# Patient Record
Sex: FEMALE | Race: WHITE | NOT HISPANIC OR LATINO
[De-identification: names, ages, dates, MRNs, and addresses within clinical notes are randomized per-mention and may not be internally consistent; named-entity substitution may affect disease eponyms.]

---

## 2017-01-05 ENCOUNTER — APPOINTMENT (OUTPATIENT)
Dept: CARDIOLOGY | Facility: CLINIC | Age: 82
End: 2017-01-05

## 2017-01-05 ENCOUNTER — NON-APPOINTMENT (OUTPATIENT)
Age: 82
End: 2017-01-05

## 2017-01-05 VITALS
BODY MASS INDEX: 24.11 KG/M2 | WEIGHT: 131 LBS | OXYGEN SATURATION: 97 % | HEART RATE: 85 BPM | DIASTOLIC BLOOD PRESSURE: 91 MMHG | HEIGHT: 62 IN | SYSTOLIC BLOOD PRESSURE: 164 MMHG

## 2017-01-05 DIAGNOSIS — I27.2 OTHER SECONDARY PULMONARY HYPERTENSION: ICD-10-CM

## 2017-01-05 DIAGNOSIS — K59.00 CONSTIPATION, UNSPECIFIED: ICD-10-CM

## 2017-01-05 DIAGNOSIS — R12 HEARTBURN: ICD-10-CM

## 2017-01-05 DIAGNOSIS — K21.9 GASTRO-ESOPHAGEAL REFLUX DISEASE W/OUT ESOPHAGITIS: ICD-10-CM

## 2017-01-05 DIAGNOSIS — Z87.898 PERSONAL HISTORY OF OTHER SPECIFIED CONDITIONS: ICD-10-CM

## 2017-01-16 ENCOUNTER — APPOINTMENT (OUTPATIENT)
Dept: CARDIOLOGY | Facility: CLINIC | Age: 82
End: 2017-01-16

## 2017-02-02 ENCOUNTER — APPOINTMENT (OUTPATIENT)
Dept: CARDIOLOGY | Facility: CLINIC | Age: 82
End: 2017-02-02

## 2017-03-05 ENCOUNTER — EMERGENCY (EMERGENCY)
Facility: HOSPITAL | Age: 82
LOS: 1 days | Discharge: HOME | End: 2017-03-05
Attending: EMERGENCY MEDICINE | Admitting: EMERGENCY MEDICINE
Payer: MEDICARE

## 2017-03-05 VITALS
HEART RATE: 82 BPM | RESPIRATION RATE: 18 BRPM | DIASTOLIC BLOOD PRESSURE: 79 MMHG | HEIGHT: 62 IN | WEIGHT: 130.07 LBS | OXYGEN SATURATION: 100 % | SYSTOLIC BLOOD PRESSURE: 155 MMHG | TEMPERATURE: 98 F

## 2017-03-05 DIAGNOSIS — I10 ESSENTIAL (PRIMARY) HYPERTENSION: ICD-10-CM

## 2017-03-05 DIAGNOSIS — Z98.89 OTHER SPECIFIED POSTPROCEDURAL STATES: Chronic | ICD-10-CM

## 2017-03-05 DIAGNOSIS — Z98.890 OTHER SPECIFIED POSTPROCEDURAL STATES: ICD-10-CM

## 2017-03-05 DIAGNOSIS — K59.00 CONSTIPATION, UNSPECIFIED: ICD-10-CM

## 2017-03-05 DIAGNOSIS — Z90.49 ACQUIRED ABSENCE OF OTHER SPECIFIED PARTS OF DIGESTIVE TRACT: ICD-10-CM

## 2017-03-05 DIAGNOSIS — E03.9 HYPOTHYROIDISM, UNSPECIFIED: ICD-10-CM

## 2017-03-05 LAB
BASOPHILS # BLD AUTO: 0.1 K/UL — SIGNIFICANT CHANGE UP (ref 0–0.2)
BASOPHILS NFR BLD AUTO: 1 % — SIGNIFICANT CHANGE UP (ref 0–2)
EOSINOPHIL # BLD AUTO: 0.1 K/UL — SIGNIFICANT CHANGE UP (ref 0–0.5)
EOSINOPHIL NFR BLD AUTO: 1.1 % — SIGNIFICANT CHANGE UP (ref 0–6)
HCT VFR BLD CALC: 35.6 % — SIGNIFICANT CHANGE UP (ref 34.5–45)
HGB BLD-MCNC: 12.5 G/DL — SIGNIFICANT CHANGE UP (ref 11.5–15.5)
LYMPHOCYTES # BLD AUTO: 1.6 K/UL — SIGNIFICANT CHANGE UP (ref 1–3.3)
LYMPHOCYTES # BLD AUTO: 26.6 % — SIGNIFICANT CHANGE UP (ref 13–44)
MCHC RBC-ENTMCNC: 32.3 PG — SIGNIFICANT CHANGE UP (ref 27–34)
MCHC RBC-ENTMCNC: 35.1 GM/DL — SIGNIFICANT CHANGE UP (ref 32–36)
MCV RBC AUTO: 92 FL — SIGNIFICANT CHANGE UP (ref 80–100)
MONOCYTES # BLD AUTO: 0.5 K/UL — SIGNIFICANT CHANGE UP (ref 0–0.9)
MONOCYTES NFR BLD AUTO: 8.6 % — SIGNIFICANT CHANGE UP (ref 1–9)
NEUTROPHILS # BLD AUTO: 3.9 K/UL — SIGNIFICANT CHANGE UP (ref 1.8–7.4)
NEUTROPHILS NFR BLD AUTO: 62.8 % — SIGNIFICANT CHANGE UP (ref 43–77)
PLATELET # BLD AUTO: 228 K/UL — SIGNIFICANT CHANGE UP (ref 150–400)
RBC # BLD: 3.86 M/UL — SIGNIFICANT CHANGE UP (ref 3.8–5.2)
RBC # FLD: 12 % — SIGNIFICANT CHANGE UP (ref 10.3–14.5)
WBC # BLD: 6.2 K/UL — SIGNIFICANT CHANGE UP (ref 3.8–10.5)
WBC # FLD AUTO: 6.2 K/UL — SIGNIFICANT CHANGE UP (ref 3.8–10.5)

## 2017-03-05 PROCEDURE — 71010: CPT | Mod: 26

## 2017-03-05 PROCEDURE — 99284 EMERGENCY DEPT VISIT MOD MDM: CPT | Mod: 25

## 2017-03-05 RX ORDER — MORPHINE SULFATE 50 MG/1
4 CAPSULE, EXTENDED RELEASE ORAL ONCE
Qty: 0 | Refills: 0 | Status: DISCONTINUED | OUTPATIENT
Start: 2017-03-05 | End: 2017-03-05

## 2017-03-05 RX ORDER — SODIUM CHLORIDE 9 MG/ML
1000 INJECTION INTRAMUSCULAR; INTRAVENOUS; SUBCUTANEOUS
Qty: 0 | Refills: 0 | Status: DISCONTINUED | OUTPATIENT
Start: 2017-03-05 | End: 2017-03-09

## 2017-03-05 RX ORDER — IOHEXOL 300 MG/ML
30 INJECTION, SOLUTION INTRAVENOUS ONCE
Qty: 0 | Refills: 0 | Status: COMPLETED | OUTPATIENT
Start: 2017-03-05 | End: 2017-03-05

## 2017-03-05 RX ORDER — SODIUM CHLORIDE 9 MG/ML
3 INJECTION INTRAMUSCULAR; INTRAVENOUS; SUBCUTANEOUS ONCE
Qty: 0 | Refills: 0 | Status: COMPLETED | OUTPATIENT
Start: 2017-03-05 | End: 2017-03-05

## 2017-03-05 RX ORDER — ONDANSETRON 8 MG/1
4 TABLET, FILM COATED ORAL ONCE
Qty: 0 | Refills: 0 | Status: COMPLETED | OUTPATIENT
Start: 2017-03-05 | End: 2017-03-05

## 2017-03-05 RX ADMIN — IOHEXOL 30 MILLILITER(S): 300 INJECTION, SOLUTION INTRAVENOUS at 23:56

## 2017-03-05 RX ADMIN — SODIUM CHLORIDE 125 MILLILITER(S): 9 INJECTION INTRAMUSCULAR; INTRAVENOUS; SUBCUTANEOUS at 23:57

## 2017-03-05 RX ADMIN — SODIUM CHLORIDE 3 MILLILITER(S): 9 INJECTION INTRAMUSCULAR; INTRAVENOUS; SUBCUTANEOUS at 23:57

## 2017-03-05 RX ADMIN — ONDANSETRON 4 MILLIGRAM(S): 8 TABLET, FILM COATED ORAL at 23:56

## 2017-03-05 RX ADMIN — Medication 30 MILLILITER(S): at 23:57

## 2017-03-05 NOTE — ED ADULT NURSE NOTE - OBJECTIVE STATEMENT
pt presents to the ER with c/o abdominal pain, cramping x several days worse today pain is a 9/10 pt denies nausea or vomiting

## 2017-03-05 NOTE — ED ADULT NURSE NOTE - PMH
Gastroesophageal reflux disease without esophagitis    Hiatal hernia    HTN (hypertension)    Hypothyroid    IBS (irritable bowel syndrome)

## 2017-03-05 NOTE — ED ADULT TRIAGE NOTE - CHIEF COMPLAINT QUOTE
"I have abdominal cramps for one day" patient denies vomiting, diarrhea, chills, fever. patient had 2 bowel movements today. patient took miralax today

## 2017-03-06 VITALS
RESPIRATION RATE: 16 BRPM | TEMPERATURE: 98 F | OXYGEN SATURATION: 98 % | HEART RATE: 75 BPM | SYSTOLIC BLOOD PRESSURE: 120 MMHG | DIASTOLIC BLOOD PRESSURE: 76 MMHG

## 2017-03-06 LAB
ALBUMIN SERPL ELPH-MCNC: 3.8 G/DL — SIGNIFICANT CHANGE UP (ref 3.3–5)
ALP SERPL-CCNC: 63 U/L — SIGNIFICANT CHANGE UP (ref 40–120)
ALT FLD-CCNC: 18 U/L — SIGNIFICANT CHANGE UP (ref 12–78)
ANION GAP SERPL CALC-SCNC: 10 MMOL/L — SIGNIFICANT CHANGE UP (ref 5–17)
APPEARANCE UR: ABNORMAL
APTT BLD: 31.8 SEC — SIGNIFICANT CHANGE UP (ref 27.5–37.4)
AST SERPL-CCNC: 18 U/L — SIGNIFICANT CHANGE UP (ref 15–37)
BILIRUB SERPL-MCNC: 0.4 MG/DL — SIGNIFICANT CHANGE UP (ref 0.2–1.2)
BILIRUB UR-MCNC: NEGATIVE — SIGNIFICANT CHANGE UP
BUN SERPL-MCNC: 18 MG/DL — SIGNIFICANT CHANGE UP (ref 7–23)
CALCIUM SERPL-MCNC: 8.8 MG/DL — SIGNIFICANT CHANGE UP (ref 8.5–10.1)
CHLORIDE SERPL-SCNC: 98 MMOL/L — SIGNIFICANT CHANGE UP (ref 96–108)
CO2 SERPL-SCNC: 29 MMOL/L — SIGNIFICANT CHANGE UP (ref 22–31)
COLOR SPEC: YELLOW — SIGNIFICANT CHANGE UP
CREAT SERPL-MCNC: 0.97 MG/DL — SIGNIFICANT CHANGE UP (ref 0.5–1.3)
DIFF PNL FLD: ABNORMAL
EPI CELLS # UR: SIGNIFICANT CHANGE UP
GLUCOSE SERPL-MCNC: 98 MG/DL — SIGNIFICANT CHANGE UP (ref 70–99)
GLUCOSE UR QL: NEGATIVE — SIGNIFICANT CHANGE UP
INR BLD: 1.1 RATIO — SIGNIFICANT CHANGE UP (ref 0.88–1.16)
KETONES UR-MCNC: NEGATIVE — SIGNIFICANT CHANGE UP
LACTATE SERPL-SCNC: 1.5 MMOL/L — SIGNIFICANT CHANGE UP (ref 0.7–2)
LEUKOCYTE ESTERASE UR-ACNC: NEGATIVE — SIGNIFICANT CHANGE UP
LIDOCAIN IGE QN: 202 U/L — SIGNIFICANT CHANGE UP (ref 73–393)
NITRITE UR-MCNC: NEGATIVE — SIGNIFICANT CHANGE UP
OB PNL STL: NEGATIVE — SIGNIFICANT CHANGE UP
PH UR: 6.5 — SIGNIFICANT CHANGE UP (ref 4.8–8)
POTASSIUM SERPL-MCNC: 4 MMOL/L — SIGNIFICANT CHANGE UP (ref 3.5–5.3)
POTASSIUM SERPL-SCNC: 4 MMOL/L — SIGNIFICANT CHANGE UP (ref 3.5–5.3)
PROT SERPL-MCNC: 6.6 G/DL — SIGNIFICANT CHANGE UP (ref 6–8.3)
PROT UR-MCNC: NEGATIVE — SIGNIFICANT CHANGE UP
PROTHROM AB SERPL-ACNC: 12.2 SEC — SIGNIFICANT CHANGE UP (ref 10–13.1)
RBC CASTS # UR COMP ASSIST: ABNORMAL /HPF (ref 0–4)
SODIUM SERPL-SCNC: 137 MMOL/L — SIGNIFICANT CHANGE UP (ref 135–145)
SP GR SPEC: 1.01 — SIGNIFICANT CHANGE UP (ref 1.01–1.02)
UROBILINOGEN FLD QL: NEGATIVE — SIGNIFICANT CHANGE UP
WBC UR QL: SIGNIFICANT CHANGE UP

## 2017-03-06 PROCEDURE — 85610 PROTHROMBIN TIME: CPT

## 2017-03-06 PROCEDURE — 74177 CT ABD & PELVIS W/CONTRAST: CPT

## 2017-03-06 PROCEDURE — 99284 EMERGENCY DEPT VISIT MOD MDM: CPT | Mod: 25

## 2017-03-06 PROCEDURE — 87086 URINE CULTURE/COLONY COUNT: CPT

## 2017-03-06 PROCEDURE — 83690 ASSAY OF LIPASE: CPT

## 2017-03-06 PROCEDURE — 85730 THROMBOPLASTIN TIME PARTIAL: CPT

## 2017-03-06 PROCEDURE — 82272 OCCULT BLD FECES 1-3 TESTS: CPT

## 2017-03-06 PROCEDURE — 80053 COMPREHEN METABOLIC PANEL: CPT

## 2017-03-06 PROCEDURE — 74177 CT ABD & PELVIS W/CONTRAST: CPT | Mod: 26

## 2017-03-06 PROCEDURE — 81001 URINALYSIS AUTO W/SCOPE: CPT

## 2017-03-06 PROCEDURE — 96374 THER/PROPH/DIAG INJ IV PUSH: CPT

## 2017-03-06 PROCEDURE — 83605 ASSAY OF LACTIC ACID: CPT

## 2017-03-06 PROCEDURE — 85027 COMPLETE CBC AUTOMATED: CPT

## 2017-03-06 PROCEDURE — 71045 X-RAY EXAM CHEST 1 VIEW: CPT

## 2017-03-06 NOTE — ED PROVIDER NOTE - CONSTITUTIONAL, MLM
normal... Well appearing, well nourished, awake, alert, oriented to person, place, time/situation and uncomfortable holding her stomach as she was walked into the er from the waiting room

## 2017-03-06 NOTE — ED PROVIDER NOTE - OBJECTIVE STATEMENT
Pt is a 83 yo f who has hx of ibs gi is dr Lowell Jones pmd is dr tatiana Alonso.  she has hsx of hiatal hernia repair, htn colon polyp for pavan smoker  she gets bouts of constipation for which she takes daily miralax 1/2 cap. as the full cap would give her diarrhea.  today she was feeling well despite not havign a large regular bm. ate a big dinner (gluten and latose free for 2 weeks), and soon after developed abd pain cramping and feeling unwell. she tried to have a bm but had no success. feeling miserable for few hours she came to er with son for eval. no fever no n no vomiting  no chest terry no back pain no sob no urine sx

## 2017-03-06 NOTE — ED PROVIDER NOTE - PSH
H/O hernia repair  12/22/2015  S/P colon resection  removal mof being colon polyp  S/P inguinal hernia repair  left 5 years ago

## 2017-03-07 LAB
CULTURE RESULTS: SIGNIFICANT CHANGE UP
SPECIMEN SOURCE: SIGNIFICANT CHANGE UP

## 2017-06-29 ENCOUNTER — EMERGENCY (EMERGENCY)
Facility: HOSPITAL | Age: 82
LOS: 1 days | Discharge: HOME | End: 2017-06-29
Attending: EMERGENCY MEDICINE | Admitting: EMERGENCY MEDICINE
Payer: MEDICARE

## 2017-06-29 VITALS
DIASTOLIC BLOOD PRESSURE: 77 MMHG | TEMPERATURE: 98 F | SYSTOLIC BLOOD PRESSURE: 129 MMHG | HEART RATE: 61 BPM | WEIGHT: 136.69 LBS | OXYGEN SATURATION: 96 % | RESPIRATION RATE: 16 BRPM

## 2017-06-29 VITALS
OXYGEN SATURATION: 97 % | DIASTOLIC BLOOD PRESSURE: 86 MMHG | HEART RATE: 68 BPM | SYSTOLIC BLOOD PRESSURE: 136 MMHG | RESPIRATION RATE: 15 BRPM

## 2017-06-29 DIAGNOSIS — N39.0 URINARY TRACT INFECTION, SITE NOT SPECIFIED: ICD-10-CM

## 2017-06-29 DIAGNOSIS — M54.42 LUMBAGO WITH SCIATICA, LEFT SIDE: ICD-10-CM

## 2017-06-29 DIAGNOSIS — Z98.89 OTHER SPECIFIED POSTPROCEDURAL STATES: Chronic | ICD-10-CM

## 2017-06-29 DIAGNOSIS — M54.5 LOW BACK PAIN: ICD-10-CM

## 2017-06-29 DIAGNOSIS — K21.9 GASTRO-ESOPHAGEAL REFLUX DISEASE WITHOUT ESOPHAGITIS: ICD-10-CM

## 2017-06-29 DIAGNOSIS — K58.9 IRRITABLE BOWEL SYNDROME WITHOUT DIARRHEA: ICD-10-CM

## 2017-06-29 DIAGNOSIS — E03.9 HYPOTHYROIDISM, UNSPECIFIED: ICD-10-CM

## 2017-06-29 DIAGNOSIS — Z98.0 INTESTINAL BYPASS AND ANASTOMOSIS STATUS: ICD-10-CM

## 2017-06-29 DIAGNOSIS — I10 ESSENTIAL (PRIMARY) HYPERTENSION: ICD-10-CM

## 2017-06-29 LAB
APPEARANCE UR: CLEAR — SIGNIFICANT CHANGE UP
BILIRUB UR-MCNC: NEGATIVE — SIGNIFICANT CHANGE UP
COLOR SPEC: SIGNIFICANT CHANGE UP
DIFF PNL FLD: ABNORMAL
GLUCOSE UR QL: NEGATIVE — SIGNIFICANT CHANGE UP
KETONES UR-MCNC: NEGATIVE — SIGNIFICANT CHANGE UP
LEUKOCYTE ESTERASE UR-ACNC: ABNORMAL
NITRITE UR-MCNC: NEGATIVE — SIGNIFICANT CHANGE UP
PH UR: 6.5 — SIGNIFICANT CHANGE UP (ref 5–8)
PROT UR-MCNC: NEGATIVE — SIGNIFICANT CHANGE UP
SP GR SPEC: 1.01 — SIGNIFICANT CHANGE UP (ref 1.01–1.02)
UROBILINOGEN FLD QL: NEGATIVE — SIGNIFICANT CHANGE UP

## 2017-06-29 PROCEDURE — 72100 X-RAY EXAM L-S SPINE 2/3 VWS: CPT | Mod: 26

## 2017-06-29 PROCEDURE — 81001 URINALYSIS AUTO W/SCOPE: CPT

## 2017-06-29 PROCEDURE — 87086 URINE CULTURE/COLONY COUNT: CPT

## 2017-06-29 PROCEDURE — 72100 X-RAY EXAM L-S SPINE 2/3 VWS: CPT

## 2017-06-29 PROCEDURE — 99283 EMERGENCY DEPT VISIT LOW MDM: CPT | Mod: 25

## 2017-06-29 PROCEDURE — 99283 EMERGENCY DEPT VISIT LOW MDM: CPT

## 2017-06-29 RX ORDER — LIDOCAINE 4 G/100G
1 CREAM TOPICAL
Qty: 5 | Refills: 0 | OUTPATIENT
Start: 2017-06-29 | End: 2017-07-04

## 2017-06-29 RX ORDER — NITROFURANTOIN MACROCRYSTAL 50 MG
100 CAPSULE ORAL ONCE
Qty: 0 | Refills: 0 | Status: COMPLETED | OUTPATIENT
Start: 2017-06-29 | End: 2017-06-29

## 2017-06-29 RX ORDER — NITROFURANTOIN MACROCRYSTAL 50 MG
1 CAPSULE ORAL
Qty: 6 | Refills: 0 | OUTPATIENT
Start: 2017-06-29 | End: 2017-07-02

## 2017-06-29 RX ORDER — LIDOCAINE 4 G/100G
1 CREAM TOPICAL ONCE
Qty: 0 | Refills: 0 | Status: COMPLETED | OUTPATIENT
Start: 2017-06-29 | End: 2017-06-29

## 2017-06-29 RX ORDER — ACETAMINOPHEN 500 MG
650 TABLET ORAL ONCE
Qty: 0 | Refills: 0 | Status: COMPLETED | OUTPATIENT
Start: 2017-06-29 | End: 2017-06-29

## 2017-06-29 RX ADMIN — LIDOCAINE 1 PATCH: 4 CREAM TOPICAL at 21:27

## 2017-06-29 RX ADMIN — Medication 100 MILLIGRAM(S): at 21:02

## 2017-06-29 NOTE — ED ADULT NURSE NOTE - OBJECTIVE STATEMENT
patient c/o bilateral lower back pain, hx of chronic UTI and sciatica. No fever, no other complaints. Daughter states patient has appointment with her urologist tomorrow and will keep that appointment

## 2017-06-29 NOTE — ED PROVIDER NOTE - CHPI ED SYMPTOMS NEG
no bowel dysfunction/no motor function loss/no constipation/no numbness/no difficulty bearing weight/no bladder dysfunction/no anorexia/no tingling/no neck tenderness/no fatigue

## 2017-06-29 NOTE — ED PROVIDER NOTE - CARE PLAN
Principal Discharge DX:	Acute low back pain with left-sided sciatica, unspecified back pain laterality  Secondary Diagnosis:	Urinary tract infection, site unspecified

## 2017-06-29 NOTE — ED PROVIDER NOTE - MEDICAL DECISION MAKING DETAILS
xray, pain management, ua, urine culture, reeval  Please follow up with your Primary MD in 24-48 hr. Macrobid twice a day x 3 days. Lidoderm patch apply once daily to affected area as needed. OTC tylenol every 6 hours as needed for pain. Return to ED immediately if condition worsens or any concerns.  Seek immediate medical care for any new/worsening signs or symptoms.

## 2017-06-29 NOTE — ED PROVIDER NOTE - ATTENDING CONTRIBUTION TO CARE
Pt is a 83 yo female who presents to the ED with a cc of back pain.  Pt reports that the back pain began today.  She reports bilateral lower lumbar back pain with radiation down both legs ending right above her knees.  Pt reports pain is worse on her left side.  Denies trauma to her back denies heavy lifting.  Pt does suffer from arthritis and has had flares of sciatica in the past. Denies numbness or tingling in ext.  Denies loss of bowel or bladder function.  Denies fever, chills, N/V, CP, SOB, abd pain.  Pt does report dysuria, urinary frequency, and urgency.  She is concerned that she may have a UTI because she does suffer from frequent UTIs.  On exam sitting in bed in NAD.  Heart RRR, lungs CTA, abd soft NT/ND.  TTP bilateral lower lumbar paraspinal region with increased spasm/tone worse on the left.  No midline lumbar or thoracic tenderness.  Positive straight leg bilaterally.  Sensation intact to bilateral lower ext +pedal pulses. Agree with above.  Discussed potential for muscle relaxants but pt refusing at this time as she is unsteady on her feet already

## 2017-06-29 NOTE — ED PROVIDER NOTE - OBJECTIVE STATEMENT
85 y/o female presents to ED c/o lower back pain radiating to left leg x 1 day. Rates pain 5/10, gradual onset, worse with movement. Pt states she has h/o sciatica in the past. Denies recent fall or trauma. States she also has h/o uti's. Denies dysuria, hematuria, urinary frequency. Denies any other complaints. States she otherwise feels good. Denies urinary or bowel incontinence. Denies saddle paresthesia. Denies n/v, f/c, chest pain, sob, numbness, tingling.

## 2017-06-29 NOTE — ED PROVIDER NOTE - PHYSICAL EXAMINATION
No CVA tenderness BL.   Head- NC/AT. No lindsey signs, no raccoon eyes, no hemotympanum, no contusions, no hematoma, no dental injuries.  Spine- no midline or paraspinal tenderness of cspine, thoracic spine or lumbar spine. No signs of back trauma, no masses, no abrasions, no lacerations, no redness, no bruising.  Neck- supple, no midline tenderness to palpation, + FROM, NEXUS negative, no abrasions, no ecchymosis.  Neuro- EOM intact, no nystagmus. Pelvis stable. Pt able to straight leg raise BL. NVI, good distal pulses x 4 extremities, capillary refill <2 sec x 4 extremities, sensation intact throughout, 5/5 motor x 4 extremities. Gait normal without limp. DTRs normal x 4 extremities.

## 2017-07-01 LAB
CULTURE RESULTS: SIGNIFICANT CHANGE UP
SPECIMEN SOURCE: SIGNIFICANT CHANGE UP

## 2018-04-23 ENCOUNTER — EMERGENCY (EMERGENCY)
Facility: HOSPITAL | Age: 83
LOS: 1 days | Discharge: HOME | End: 2018-04-23
Attending: EMERGENCY MEDICINE | Admitting: EMERGENCY MEDICINE
Payer: MEDICARE

## 2018-04-23 VITALS
HEIGHT: 62 IN | TEMPERATURE: 97 F | HEART RATE: 91 BPM | RESPIRATION RATE: 18 BRPM | WEIGHT: 130.07 LBS | SYSTOLIC BLOOD PRESSURE: 158 MMHG | DIASTOLIC BLOOD PRESSURE: 78 MMHG | OXYGEN SATURATION: 97 %

## 2018-04-23 VITALS — RESPIRATION RATE: 18 BRPM | OXYGEN SATURATION: 100 %

## 2018-04-23 DIAGNOSIS — Z98.89 OTHER SPECIFIED POSTPROCEDURAL STATES: Chronic | ICD-10-CM

## 2018-04-23 LAB
ALBUMIN SERPL ELPH-MCNC: 3.7 G/DL — SIGNIFICANT CHANGE UP (ref 3.3–5)
ALP SERPL-CCNC: 74 U/L — SIGNIFICANT CHANGE UP (ref 40–120)
ALT FLD-CCNC: 18 U/L — SIGNIFICANT CHANGE UP (ref 12–78)
ANION GAP SERPL CALC-SCNC: 7 MMOL/L — SIGNIFICANT CHANGE UP (ref 5–17)
APPEARANCE UR: ABNORMAL
AST SERPL-CCNC: 20 U/L — SIGNIFICANT CHANGE UP (ref 15–37)
BACTERIA # UR AUTO: ABNORMAL
BASOPHILS # BLD AUTO: 0.1 K/UL — SIGNIFICANT CHANGE UP (ref 0–0.2)
BASOPHILS NFR BLD AUTO: 0.8 % — SIGNIFICANT CHANGE UP (ref 0–2)
BILIRUB SERPL-MCNC: 0.6 MG/DL — SIGNIFICANT CHANGE UP (ref 0.2–1.2)
BILIRUB UR-MCNC: NEGATIVE — SIGNIFICANT CHANGE UP
BUN SERPL-MCNC: 13 MG/DL — SIGNIFICANT CHANGE UP (ref 7–23)
CALCIUM SERPL-MCNC: 8.5 MG/DL — SIGNIFICANT CHANGE UP (ref 8.5–10.1)
CHLORIDE SERPL-SCNC: 103 MMOL/L — SIGNIFICANT CHANGE UP (ref 96–108)
CK MB BLD-MCNC: 3.2 % — SIGNIFICANT CHANGE UP (ref 0–3.5)
CK MB CFR SERPL CALC: 3.8 NG/ML — HIGH (ref 0–3.6)
CK SERPL-CCNC: 118 U/L — SIGNIFICANT CHANGE UP (ref 26–192)
CO2 SERPL-SCNC: 28 MMOL/L — SIGNIFICANT CHANGE UP (ref 22–31)
COLOR SPEC: SIGNIFICANT CHANGE UP
CREAT SERPL-MCNC: 0.72 MG/DL — SIGNIFICANT CHANGE UP (ref 0.5–1.3)
D DIMER BLD IA.RAPID-MCNC: <150 NG/ML DDU — SIGNIFICANT CHANGE UP
DIFF PNL FLD: ABNORMAL
EOSINOPHIL # BLD AUTO: 0 K/UL — SIGNIFICANT CHANGE UP (ref 0–0.5)
EOSINOPHIL NFR BLD AUTO: 0.3 % — SIGNIFICANT CHANGE UP (ref 0–6)
EPI CELLS # UR: SIGNIFICANT CHANGE UP
GLUCOSE SERPL-MCNC: 92 MG/DL — SIGNIFICANT CHANGE UP (ref 70–99)
GLUCOSE UR QL: NEGATIVE — SIGNIFICANT CHANGE UP
HCT VFR BLD CALC: 39.3 % — SIGNIFICANT CHANGE UP (ref 34.5–45)
HGB BLD-MCNC: 13.4 G/DL — SIGNIFICANT CHANGE UP (ref 11.5–15.5)
KETONES UR-MCNC: NEGATIVE — SIGNIFICANT CHANGE UP
LEUKOCYTE ESTERASE UR-ACNC: NEGATIVE — SIGNIFICANT CHANGE UP
LIDOCAIN IGE QN: 117 U/L — SIGNIFICANT CHANGE UP (ref 73–393)
LYMPHOCYTES # BLD AUTO: 1.1 K/UL — SIGNIFICANT CHANGE UP (ref 1–3.3)
LYMPHOCYTES # BLD AUTO: 16.6 % — SIGNIFICANT CHANGE UP (ref 13–44)
MCHC RBC-ENTMCNC: 32 PG — SIGNIFICANT CHANGE UP (ref 27–34)
MCHC RBC-ENTMCNC: 34 GM/DL — SIGNIFICANT CHANGE UP (ref 32–36)
MCV RBC AUTO: 94.3 FL — SIGNIFICANT CHANGE UP (ref 80–100)
MONOCYTES # BLD AUTO: 0.6 K/UL — SIGNIFICANT CHANGE UP (ref 0–0.9)
MONOCYTES NFR BLD AUTO: 9.4 % — HIGH (ref 1–9)
NEUTROPHILS # BLD AUTO: 4.6 K/UL — SIGNIFICANT CHANGE UP (ref 1.8–7.4)
NEUTROPHILS NFR BLD AUTO: 72.9 % — SIGNIFICANT CHANGE UP (ref 43–77)
NITRITE UR-MCNC: NEGATIVE — SIGNIFICANT CHANGE UP
PH UR: 7 — SIGNIFICANT CHANGE UP (ref 5–8)
PLATELET # BLD AUTO: 224 K/UL — SIGNIFICANT CHANGE UP (ref 150–400)
POTASSIUM SERPL-MCNC: 4 MMOL/L — SIGNIFICANT CHANGE UP (ref 3.5–5.3)
POTASSIUM SERPL-SCNC: 4 MMOL/L — SIGNIFICANT CHANGE UP (ref 3.5–5.3)
PROT SERPL-MCNC: 7.1 G/DL — SIGNIFICANT CHANGE UP (ref 6–8.3)
PROT UR-MCNC: NEGATIVE — SIGNIFICANT CHANGE UP
RBC # BLD: 4.17 M/UL — SIGNIFICANT CHANGE UP (ref 3.8–5.2)
RBC # FLD: 12 % — SIGNIFICANT CHANGE UP (ref 10.3–14.5)
RBC CASTS # UR COMP ASSIST: ABNORMAL /HPF (ref 0–4)
SODIUM SERPL-SCNC: 138 MMOL/L — SIGNIFICANT CHANGE UP (ref 135–145)
SP GR SPEC: 1.01 — SIGNIFICANT CHANGE UP (ref 1.01–1.02)
TROPONIN I SERPL-MCNC: <.015 NG/ML — SIGNIFICANT CHANGE UP (ref 0.01–0.04)
UROBILINOGEN FLD QL: NEGATIVE — SIGNIFICANT CHANGE UP
WBC # BLD: 6.4 K/UL — SIGNIFICANT CHANGE UP (ref 3.8–10.5)
WBC # FLD AUTO: 6.4 K/UL — SIGNIFICANT CHANGE UP (ref 3.8–10.5)
WBC UR QL: SIGNIFICANT CHANGE UP

## 2018-04-23 PROCEDURE — 99285 EMERGENCY DEPT VISIT HI MDM: CPT

## 2018-04-23 PROCEDURE — 81001 URINALYSIS AUTO W/SCOPE: CPT

## 2018-04-23 PROCEDURE — 85379 FIBRIN DEGRADATION QUANT: CPT

## 2018-04-23 PROCEDURE — 83690 ASSAY OF LIPASE: CPT

## 2018-04-23 PROCEDURE — 82553 CREATINE MB FRACTION: CPT

## 2018-04-23 PROCEDURE — 99284 EMERGENCY DEPT VISIT MOD MDM: CPT | Mod: 25

## 2018-04-23 PROCEDURE — 93005 ELECTROCARDIOGRAM TRACING: CPT

## 2018-04-23 PROCEDURE — 82550 ASSAY OF CK (CPK): CPT

## 2018-04-23 PROCEDURE — 85027 COMPLETE CBC AUTOMATED: CPT

## 2018-04-23 PROCEDURE — 80053 COMPREHEN METABOLIC PANEL: CPT

## 2018-04-23 PROCEDURE — 93010 ELECTROCARDIOGRAM REPORT: CPT

## 2018-04-23 PROCEDURE — 71045 X-RAY EXAM CHEST 1 VIEW: CPT | Mod: 26

## 2018-04-23 PROCEDURE — 70450 CT HEAD/BRAIN W/O DYE: CPT

## 2018-04-23 PROCEDURE — 87086 URINE CULTURE/COLONY COUNT: CPT

## 2018-04-23 PROCEDURE — 84484 ASSAY OF TROPONIN QUANT: CPT

## 2018-04-23 PROCEDURE — 96374 THER/PROPH/DIAG INJ IV PUSH: CPT

## 2018-04-23 PROCEDURE — 70450 CT HEAD/BRAIN W/O DYE: CPT | Mod: 26

## 2018-04-23 PROCEDURE — 71045 X-RAY EXAM CHEST 1 VIEW: CPT

## 2018-04-23 RX ORDER — ONDANSETRON 8 MG/1
4 TABLET, FILM COATED ORAL ONCE
Qty: 0 | Refills: 0 | Status: COMPLETED | OUTPATIENT
Start: 2018-04-23 | End: 2018-04-23

## 2018-04-23 RX ORDER — SODIUM CHLORIDE 9 MG/ML
1000 INJECTION INTRAMUSCULAR; INTRAVENOUS; SUBCUTANEOUS ONCE
Qty: 0 | Refills: 0 | Status: COMPLETED | OUTPATIENT
Start: 2018-04-23 | End: 2018-04-23

## 2018-04-23 RX ADMIN — SODIUM CHLORIDE 1000 MILLILITER(S): 9 INJECTION INTRAMUSCULAR; INTRAVENOUS; SUBCUTANEOUS at 12:06

## 2018-04-23 RX ADMIN — ONDANSETRON 4 MILLIGRAM(S): 8 TABLET, FILM COATED ORAL at 12:06

## 2018-04-23 NOTE — ED PROVIDER NOTE - OBJECTIVE STATEMENT
85 female presents to ER with son c/o feeling dizziness, states last night she felt light headed and passed out for a few seconds, today patient feeling anxious and continued light headed, no vomiting, no chest pain.

## 2018-04-23 NOTE — ED ADULT NURSE NOTE - OBJECTIVE STATEMENT
85F pt AxOx3 ambulatory to ED c/o syncope last night. Pt denies CP/SOB/V/D at time of episode and currently. Pt states she suddenly felt dizzy and syncopized to floor at home, hitting her arm on the way down. Pt denies head injury. Pt states she is currently nauseous. Pt is tolerating PO intake well. No cardiac hx. Upon assessment, lungs clear, resp even, unlabored. Abd soft/NT/ND. Minor abrasion noted to R forearm, no obvious deformity. MORA. Pt ambulates at baseline w/o assist, steady gait. #20G LAC, labs drawn and sent. CM in place - NSR 80s. EKG @ bedside. Family at bedside. MD at bedside. Will continue to monitor. Safety maintained.

## 2018-04-23 NOTE — ED PROVIDER NOTE - PROGRESS NOTE DETAILS
patient feeling well, able to ambulate with out assistance, labs, ct head, ekg, chest xray reviwed, no acute findings, dc home, f/u with PMD, understands to return to ER for worsneing of symptoms

## 2018-04-24 LAB
CULTURE RESULTS: SIGNIFICANT CHANGE UP
SPECIMEN SOURCE: SIGNIFICANT CHANGE UP

## 2018-07-14 ENCOUNTER — APPOINTMENT (OUTPATIENT)
Dept: DERMATOLOGY | Facility: CLINIC | Age: 83
End: 2018-07-14
Payer: MEDICARE

## 2018-07-14 PROCEDURE — 99213 OFFICE O/P EST LOW 20 MIN: CPT | Mod: 25

## 2018-07-14 PROCEDURE — 17000 DESTRUCT PREMALG LESION: CPT

## 2018-11-23 ENCOUNTER — EMERGENCY (EMERGENCY)
Facility: HOSPITAL | Age: 83
LOS: 1 days | Discharge: HOME | End: 2018-11-23
Attending: EMERGENCY MEDICINE
Payer: MEDICARE

## 2018-11-23 VITALS
DIASTOLIC BLOOD PRESSURE: 87 MMHG | TEMPERATURE: 98 F | HEART RATE: 83 BPM | RESPIRATION RATE: 16 BRPM | WEIGHT: 132.94 LBS | OXYGEN SATURATION: 97 % | SYSTOLIC BLOOD PRESSURE: 149 MMHG | HEIGHT: 62 IN

## 2018-11-23 VITALS
RESPIRATION RATE: 17 BRPM | DIASTOLIC BLOOD PRESSURE: 77 MMHG | SYSTOLIC BLOOD PRESSURE: 159 MMHG | HEART RATE: 77 BPM | OXYGEN SATURATION: 100 % | TEMPERATURE: 98 F

## 2018-11-23 DIAGNOSIS — Z98.89 OTHER SPECIFIED POSTPROCEDURAL STATES: Chronic | ICD-10-CM

## 2018-11-23 PROBLEM — K58.9 IRRITABLE BOWEL SYNDROME WITHOUT DIARRHEA: Chronic | Status: ACTIVE | Noted: 2017-03-05

## 2018-11-23 LAB
ALBUMIN SERPL ELPH-MCNC: 4 G/DL — SIGNIFICANT CHANGE UP (ref 3.3–5)
ALP SERPL-CCNC: 81 U/L — SIGNIFICANT CHANGE UP (ref 40–120)
ALT FLD-CCNC: 24 U/L — SIGNIFICANT CHANGE UP (ref 12–78)
ANION GAP SERPL CALC-SCNC: 7 MMOL/L — SIGNIFICANT CHANGE UP (ref 5–17)
AST SERPL-CCNC: 22 U/L — SIGNIFICANT CHANGE UP (ref 15–37)
BASOPHILS # BLD AUTO: 0.03 K/UL — SIGNIFICANT CHANGE UP (ref 0–0.2)
BASOPHILS NFR BLD AUTO: 0.4 % — SIGNIFICANT CHANGE UP (ref 0–2)
BILIRUB SERPL-MCNC: 0.6 MG/DL — SIGNIFICANT CHANGE UP (ref 0.2–1.2)
BUN SERPL-MCNC: 16 MG/DL — SIGNIFICANT CHANGE UP (ref 7–23)
CALCIUM SERPL-MCNC: 8.4 MG/DL — LOW (ref 8.5–10.1)
CHLORIDE SERPL-SCNC: 103 MMOL/L — SIGNIFICANT CHANGE UP (ref 96–108)
CK MB BLD-MCNC: 3.6 % — HIGH (ref 0–3.5)
CK MB CFR SERPL CALC: 5.6 NG/ML — HIGH (ref 0–3.6)
CK SERPL-CCNC: 155 U/L — SIGNIFICANT CHANGE UP (ref 26–192)
CO2 SERPL-SCNC: 26 MMOL/L — SIGNIFICANT CHANGE UP (ref 22–31)
CREAT SERPL-MCNC: 0.89 MG/DL — SIGNIFICANT CHANGE UP (ref 0.5–1.3)
EOSINOPHIL # BLD AUTO: 0.03 K/UL — SIGNIFICANT CHANGE UP (ref 0–0.5)
EOSINOPHIL NFR BLD AUTO: 0.4 % — SIGNIFICANT CHANGE UP (ref 0–6)
GLUCOSE SERPL-MCNC: 82 MG/DL — SIGNIFICANT CHANGE UP (ref 70–99)
HCT VFR BLD CALC: 41.6 % — SIGNIFICANT CHANGE UP (ref 34.5–45)
HGB BLD-MCNC: 13.8 G/DL — SIGNIFICANT CHANGE UP (ref 11.5–15.5)
IMM GRANULOCYTES NFR BLD AUTO: 0.4 % — SIGNIFICANT CHANGE UP (ref 0–1.5)
INR BLD: 1.08 RATIO — SIGNIFICANT CHANGE UP (ref 0.88–1.16)
LYMPHOCYTES # BLD AUTO: 1.4 K/UL — SIGNIFICANT CHANGE UP (ref 1–3.3)
LYMPHOCYTES # BLD AUTO: 19.1 % — SIGNIFICANT CHANGE UP (ref 13–44)
MCHC RBC-ENTMCNC: 30.7 PG — SIGNIFICANT CHANGE UP (ref 27–34)
MCHC RBC-ENTMCNC: 33.2 GM/DL — SIGNIFICANT CHANGE UP (ref 32–36)
MCV RBC AUTO: 92.4 FL — SIGNIFICANT CHANGE UP (ref 80–100)
MONOCYTES # BLD AUTO: 0.65 K/UL — SIGNIFICANT CHANGE UP (ref 0–0.9)
MONOCYTES NFR BLD AUTO: 8.9 % — SIGNIFICANT CHANGE UP (ref 2–14)
NEUTROPHILS # BLD AUTO: 5.18 K/UL — SIGNIFICANT CHANGE UP (ref 1.8–7.4)
NEUTROPHILS NFR BLD AUTO: 70.8 % — SIGNIFICANT CHANGE UP (ref 43–77)
NRBC # BLD: 0 /100 WBCS — SIGNIFICANT CHANGE UP (ref 0–0)
PLATELET # BLD AUTO: 232 K/UL — SIGNIFICANT CHANGE UP (ref 150–400)
POTASSIUM SERPL-MCNC: 4.1 MMOL/L — SIGNIFICANT CHANGE UP (ref 3.5–5.3)
POTASSIUM SERPL-SCNC: 4.1 MMOL/L — SIGNIFICANT CHANGE UP (ref 3.5–5.3)
PROT SERPL-MCNC: 7.7 G/DL — SIGNIFICANT CHANGE UP (ref 6–8.3)
PROTHROM AB SERPL-ACNC: 12.2 SEC — SIGNIFICANT CHANGE UP (ref 10–12.9)
RBC # BLD: 4.5 M/UL — SIGNIFICANT CHANGE UP (ref 3.8–5.2)
RBC # FLD: 13.2 % — SIGNIFICANT CHANGE UP (ref 10.3–14.5)
SODIUM SERPL-SCNC: 136 MMOL/L — SIGNIFICANT CHANGE UP (ref 135–145)
TROPONIN I SERPL-MCNC: <.015 NG/ML — SIGNIFICANT CHANGE UP (ref 0.01–0.04)
TSH SERPL-MCNC: 7.63 UIU/ML — HIGH (ref 0.36–3.74)
WBC # BLD: 7.32 K/UL — SIGNIFICANT CHANGE UP (ref 3.8–10.5)
WBC # FLD AUTO: 7.32 K/UL — SIGNIFICANT CHANGE UP (ref 3.8–10.5)

## 2018-11-23 PROCEDURE — 96376 TX/PRO/DX INJ SAME DRUG ADON: CPT

## 2018-11-23 PROCEDURE — 82550 ASSAY OF CK (CPK): CPT

## 2018-11-23 PROCEDURE — 84443 ASSAY THYROID STIM HORMONE: CPT

## 2018-11-23 PROCEDURE — 70450 CT HEAD/BRAIN W/O DYE: CPT

## 2018-11-23 PROCEDURE — 82962 GLUCOSE BLOOD TEST: CPT

## 2018-11-23 PROCEDURE — 70450 CT HEAD/BRAIN W/O DYE: CPT | Mod: 26

## 2018-11-23 PROCEDURE — 82553 CREATINE MB FRACTION: CPT

## 2018-11-23 PROCEDURE — 36415 COLL VENOUS BLD VENIPUNCTURE: CPT

## 2018-11-23 PROCEDURE — 96374 THER/PROPH/DIAG INJ IV PUSH: CPT

## 2018-11-23 PROCEDURE — 99284 EMERGENCY DEPT VISIT MOD MDM: CPT

## 2018-11-23 PROCEDURE — 85610 PROTHROMBIN TIME: CPT

## 2018-11-23 PROCEDURE — 80053 COMPREHEN METABOLIC PANEL: CPT

## 2018-11-23 PROCEDURE — 84484 ASSAY OF TROPONIN QUANT: CPT

## 2018-11-23 PROCEDURE — 93005 ELECTROCARDIOGRAM TRACING: CPT

## 2018-11-23 PROCEDURE — 71046 X-RAY EXAM CHEST 2 VIEWS: CPT | Mod: 26

## 2018-11-23 PROCEDURE — 99284 EMERGENCY DEPT VISIT MOD MDM: CPT | Mod: 25

## 2018-11-23 PROCEDURE — 71046 X-RAY EXAM CHEST 2 VIEWS: CPT

## 2018-11-23 PROCEDURE — 96361 HYDRATE IV INFUSION ADD-ON: CPT

## 2018-11-23 PROCEDURE — 85027 COMPLETE CBC AUTOMATED: CPT

## 2018-11-23 RX ORDER — ONDANSETRON 8 MG/1
4 TABLET, FILM COATED ORAL ONCE
Qty: 0 | Refills: 0 | Status: COMPLETED | OUTPATIENT
Start: 2018-11-23 | End: 2018-11-23

## 2018-11-23 RX ORDER — SODIUM CHLORIDE 9 MG/ML
1000 INJECTION INTRAMUSCULAR; INTRAVENOUS; SUBCUTANEOUS
Qty: 0 | Refills: 0 | Status: DISCONTINUED | OUTPATIENT
Start: 2018-11-23 | End: 2018-11-27

## 2018-11-23 RX ORDER — ONDANSETRON 8 MG/1
1 TABLET, FILM COATED ORAL
Qty: 20 | Refills: 0
Start: 2018-11-23 | End: 2018-11-27

## 2018-11-23 RX ADMIN — ONDANSETRON 4 MILLIGRAM(S): 8 TABLET, FILM COATED ORAL at 13:17

## 2018-11-23 RX ADMIN — ONDANSETRON 4 MILLIGRAM(S): 8 TABLET, FILM COATED ORAL at 11:41

## 2018-11-23 RX ADMIN — SODIUM CHLORIDE 150 MILLILITER(S): 9 INJECTION INTRAMUSCULAR; INTRAVENOUS; SUBCUTANEOUS at 11:41

## 2018-11-23 RX ADMIN — SODIUM CHLORIDE 1000 MILLILITER(S): 9 INJECTION INTRAMUSCULAR; INTRAVENOUS; SUBCUTANEOUS at 14:58

## 2018-11-23 NOTE — ED PROVIDER NOTE - PROGRESS NOTE DETAILS
All results were explained to patient and/or family and a copy of all available results given.  pt ambulated in the ED s any complications

## 2018-11-23 NOTE — ED ADULT NURSE NOTE - OBJECTIVE STATEMENT
Present to ER with c/o intermittent dizziness and nausea for 2 weeks. Pt states she was seen by her PMD this past Wednesday and had blood work and EKG.

## 2018-11-23 NOTE — ED PROVIDER NOTE - OBJECTIVE STATEMENT
Lightheaded and dizzy and nausea x 2 weeks intermittent.  pmd- Eleno Otero.  pt was seen by pmd this past Wednesday, had blood work and ekg.  no prior episode. no other complaint. Lightheaded and dizzy and nausea x 2 weeks intermittent.  pmd- Eleno Otero.  pt was seen by pmd this past Wednesday, had blood work and ekg and Rx Meclizine that made her felt worst.  no prior episode. no other complaint.

## 2018-11-23 NOTE — ED ADULT NURSE NOTE - NSIMPLEMENTINTERV_GEN_ALL_ED
Implemented All Universal Safety Interventions:  [place] to call system. Call bell, personal items and telephone within reach. Instruct patient to call for assistance. Room bathroom lighting operational. Non-slip footwear when patient is off stretcher. Physically safe environment: no spills, clutter or unnecessary equipment. Stretcher in lowest position, wheels locked, appropriate side rails in place.

## 2019-01-24 ENCOUNTER — EMERGENCY (EMERGENCY)
Facility: HOSPITAL | Age: 84
LOS: 1 days | Discharge: HOME | End: 2019-01-24
Attending: EMERGENCY MEDICINE | Admitting: EMERGENCY MEDICINE
Payer: MEDICARE

## 2019-01-24 VITALS
DIASTOLIC BLOOD PRESSURE: 87 MMHG | HEART RATE: 96 BPM | OXYGEN SATURATION: 100 % | RESPIRATION RATE: 18 BRPM | HEIGHT: 62 IN | WEIGHT: 130.07 LBS | SYSTOLIC BLOOD PRESSURE: 149 MMHG | TEMPERATURE: 98 F

## 2019-01-24 VITALS
DIASTOLIC BLOOD PRESSURE: 80 MMHG | SYSTOLIC BLOOD PRESSURE: 147 MMHG | OXYGEN SATURATION: 97 % | HEART RATE: 87 BPM | TEMPERATURE: 99 F | RESPIRATION RATE: 16 BRPM

## 2019-01-24 DIAGNOSIS — Z98.89 OTHER SPECIFIED POSTPROCEDURAL STATES: Chronic | ICD-10-CM

## 2019-01-24 LAB
ALBUMIN SERPL ELPH-MCNC: 4 G/DL — SIGNIFICANT CHANGE UP (ref 3.3–5)
ALP SERPL-CCNC: 78 U/L — SIGNIFICANT CHANGE UP (ref 40–120)
ALT FLD-CCNC: 22 U/L — SIGNIFICANT CHANGE UP (ref 12–78)
ANION GAP SERPL CALC-SCNC: 7 MMOL/L — SIGNIFICANT CHANGE UP (ref 5–17)
APTT BLD: 31.2 SEC — SIGNIFICANT CHANGE UP (ref 28.5–37)
AST SERPL-CCNC: 21 U/L — SIGNIFICANT CHANGE UP (ref 15–37)
BASOPHILS # BLD AUTO: 0.02 K/UL — SIGNIFICANT CHANGE UP (ref 0–0.2)
BASOPHILS NFR BLD AUTO: 0.3 % — SIGNIFICANT CHANGE UP (ref 0–2)
BILIRUB SERPL-MCNC: 0.6 MG/DL — SIGNIFICANT CHANGE UP (ref 0.2–1.2)
BUN SERPL-MCNC: 19 MG/DL — SIGNIFICANT CHANGE UP (ref 7–23)
CALCIUM SERPL-MCNC: 9 MG/DL — SIGNIFICANT CHANGE UP (ref 8.5–10.1)
CHLORIDE SERPL-SCNC: 100 MMOL/L — SIGNIFICANT CHANGE UP (ref 96–108)
CK MB BLD-MCNC: 4.1 % — HIGH (ref 0–3.5)
CK MB CFR SERPL CALC: 6.3 NG/ML — HIGH (ref 0–3.6)
CK SERPL-CCNC: 153 U/L — SIGNIFICANT CHANGE UP (ref 26–192)
CO2 SERPL-SCNC: 27 MMOL/L — SIGNIFICANT CHANGE UP (ref 22–31)
CREAT SERPL-MCNC: 0.8 MG/DL — SIGNIFICANT CHANGE UP (ref 0.5–1.3)
EOSINOPHIL # BLD AUTO: 0.01 K/UL — SIGNIFICANT CHANGE UP (ref 0–0.5)
EOSINOPHIL NFR BLD AUTO: 0.2 % — SIGNIFICANT CHANGE UP (ref 0–6)
GLUCOSE SERPL-MCNC: 113 MG/DL — HIGH (ref 70–99)
HCT VFR BLD CALC: 39.7 % — SIGNIFICANT CHANGE UP (ref 34.5–45)
HGB BLD-MCNC: 13.4 G/DL — SIGNIFICANT CHANGE UP (ref 11.5–15.5)
IMM GRANULOCYTES NFR BLD AUTO: 0.5 % — SIGNIFICANT CHANGE UP (ref 0–1.5)
INR BLD: 1.05 RATIO — SIGNIFICANT CHANGE UP (ref 0.88–1.16)
LYMPHOCYTES # BLD AUTO: 1.29 K/UL — SIGNIFICANT CHANGE UP (ref 1–3.3)
LYMPHOCYTES # BLD AUTO: 20.4 % — SIGNIFICANT CHANGE UP (ref 13–44)
MCHC RBC-ENTMCNC: 31.1 PG — SIGNIFICANT CHANGE UP (ref 27–34)
MCHC RBC-ENTMCNC: 33.8 GM/DL — SIGNIFICANT CHANGE UP (ref 32–36)
MCV RBC AUTO: 92.1 FL — SIGNIFICANT CHANGE UP (ref 80–100)
MONOCYTES # BLD AUTO: 0.46 K/UL — SIGNIFICANT CHANGE UP (ref 0–0.9)
MONOCYTES NFR BLD AUTO: 7.3 % — SIGNIFICANT CHANGE UP (ref 2–14)
NEUTROPHILS # BLD AUTO: 4.51 K/UL — SIGNIFICANT CHANGE UP (ref 1.8–7.4)
NEUTROPHILS NFR BLD AUTO: 71.3 % — SIGNIFICANT CHANGE UP (ref 43–77)
NRBC # BLD: 0 /100 WBCS — SIGNIFICANT CHANGE UP (ref 0–0)
PLATELET # BLD AUTO: 235 K/UL — SIGNIFICANT CHANGE UP (ref 150–400)
POTASSIUM SERPL-MCNC: 4.6 MMOL/L — SIGNIFICANT CHANGE UP (ref 3.5–5.3)
POTASSIUM SERPL-SCNC: 4.6 MMOL/L — SIGNIFICANT CHANGE UP (ref 3.5–5.3)
PROT SERPL-MCNC: 7.3 G/DL — SIGNIFICANT CHANGE UP (ref 6–8.3)
PROTHROM AB SERPL-ACNC: 12 SEC — SIGNIFICANT CHANGE UP (ref 10–12.9)
RBC # BLD: 4.31 M/UL — SIGNIFICANT CHANGE UP (ref 3.8–5.2)
RBC # FLD: 13.1 % — SIGNIFICANT CHANGE UP (ref 10.3–14.5)
SODIUM SERPL-SCNC: 134 MMOL/L — LOW (ref 135–145)
TROPONIN I SERPL-MCNC: <.015 NG/ML — SIGNIFICANT CHANGE UP (ref 0.01–0.04)
TROPONIN I SERPL-MCNC: <.015 NG/ML — SIGNIFICANT CHANGE UP (ref 0.01–0.04)
WBC # BLD: 6.32 K/UL — SIGNIFICANT CHANGE UP (ref 3.8–10.5)
WBC # FLD AUTO: 6.32 K/UL — SIGNIFICANT CHANGE UP (ref 3.8–10.5)

## 2019-01-24 PROCEDURE — 84484 ASSAY OF TROPONIN QUANT: CPT

## 2019-01-24 PROCEDURE — 99284 EMERGENCY DEPT VISIT MOD MDM: CPT | Mod: 25

## 2019-01-24 PROCEDURE — 85027 COMPLETE CBC AUTOMATED: CPT

## 2019-01-24 PROCEDURE — 85730 THROMBOPLASTIN TIME PARTIAL: CPT

## 2019-01-24 PROCEDURE — 99285 EMERGENCY DEPT VISIT HI MDM: CPT

## 2019-01-24 PROCEDURE — 71045 X-RAY EXAM CHEST 1 VIEW: CPT

## 2019-01-24 PROCEDURE — 80053 COMPREHEN METABOLIC PANEL: CPT

## 2019-01-24 PROCEDURE — 82553 CREATINE MB FRACTION: CPT

## 2019-01-24 PROCEDURE — 85610 PROTHROMBIN TIME: CPT

## 2019-01-24 PROCEDURE — 71045 X-RAY EXAM CHEST 1 VIEW: CPT | Mod: 26

## 2019-01-24 PROCEDURE — 36415 COLL VENOUS BLD VENIPUNCTURE: CPT

## 2019-01-24 PROCEDURE — 93005 ELECTROCARDIOGRAM TRACING: CPT

## 2019-01-24 PROCEDURE — 82550 ASSAY OF CK (CPK): CPT

## 2019-01-24 NOTE — ED ADULT NURSE NOTE - OBJECTIVE STATEMENT
c/o tightness in her chest for about 3 days,presented in no distress,non dyspneic ,non diaphoretic and no facial grimaces for pain noted.

## 2019-01-24 NOTE — ED ADULT TRIAGE NOTE - CHIEF COMPLAINT QUOTE
Patient states she's having a hard time taking a deep breathe, feels as if theres a pressure on her chest.

## 2019-01-24 NOTE — ED ADULT NURSE NOTE - NSIMPLEMENTINTERV_GEN_ALL_ED
Implemented All Fall with Harm Risk Interventions:  [place] to call system. Call bell, personal items and telephone within reach. Instruct patient to call for assistance. Room bathroom lighting operational. Non-slip footwear when patient is off stretcher. Physically safe environment: no spills, clutter or unnecessary equipment. Stretcher in lowest position, wheels locked, appropriate side rails in place. Provide visual cue, wrist band, yellow gown, etc. Monitor gait and stability. Monitor for mental status changes and reorient to person, place, and time. Review medications for side effects contributing to fall risk. Reinforce activity limits and safety measures with patient and family. Provide visual clues: red socks.

## 2019-01-24 NOTE — CONSULT NOTE ADULT - SUBJECTIVE AND OBJECTIVE BOX
[facility] Consultation    CHIEF COMPLAINT: Patient is a 85y old  Female who presents with a chief complaint of chest pain    HPI:  Patient came to the emergency room complaining of chest pressure for approximately 24 hours. She reports a mild central chest pressure which has waxed and waned date she reports a mild cough but no dyspnea,, palpitations, lightheadedness, or syncope      PAST MEDICAL & SURGICAL HISTORY:  IBS (irritable bowel syndrome)  Gastroesophageal reflux disease without esophagitis  Hiatal hernia  HTN (hypertension)  Hypothyroid  H/O hernia repair: 12/22/2015  S/P inguinal hernia repair: left 5 years ago  S/P colon resection: removal mof being colon polyp      SOCIAL HISTORY: no tob/etoh    FAMILY HISTORY: no CAD/CHF  No pertinent family history in first degree relatives      MEDICATIONS  (STANDING): amlodipine, Synthroid    Allergies    No Known Allergies    REVIEW OF SYSTEMS:    CONSTITUTIONAL: No weakness, fevers or chills  EYES: No visual changes, No diplopia  ENMT: No throat pain , No exudate  NECK: No pain or stiffness  RESPIRATORY: No cough, wheezing, hemoptysis; No shortness of breath  CARDIOVASCULAR: chest pain as above, no palpitations  GASTROINTESTINAL: No abdominal pain. No nausea, vomiting, or hematemesis; No diarrhea or constipation. No melena or hematochezia.  GENITOURINARY: No dysuria, frequency or hematuria  NEUROLOGICAL: No numbness or weakness  SKIN: No itching or rash  All other review of systems is negative unless indicated above    VITAL SIGNS:   Vital Signs Last 24 Hrs  T(C): 36.7 (24 Jan 2019 15:46), Max: 36.8 (24 Jan 2019 14:28)  T(F): 98 (24 Jan 2019 15:46), Max: 98.2 (24 Jan 2019 14:28)  HR: 84 (24 Jan 2019 15:46) (84 - 96)  BP: 140/70 (24 Jan 2019 15:46) (140/70 - 149/87)  BP(mean): --  RR: 14 (24 Jan 2019 15:46) (14 - 18)  SpO2: 100% (24 Jan 2019 15:46) (100% - 100%)    PHYSICAL EXAM:    Constitutional: NAD, awake and alert, well-developed  Eyes:   Pupils round, no lesions  ENMT: no exudate or erythema  Pulmonary: Non-labored, breath sounds are clear bilaterally, No wheezing, rales or rhonchi  Cardiovascular: PMI not palpable  Regular S1 and S2, no murmurs, rubs, gallops or clicks  Gastrointestinal: Bowel Sounds present, soft, nontender.   Lymph: No peripheral edema. No cervical lymphadenopathy.  Neurological: Alert, no focal deficits  Skin: No rashes. Changes of chronic venous stasis. No cyanosis.  Psych:  Mood & affect appropriate    LABS: All Labs Reviewed:                        13.4   6.32  )-----------( 235      ( 24 Jan 2019 15:22 )             39.7     24 Jan 2019 15:22    134    |  100    |  19     ----------------------------<  113    4.6     |  27     |  0.80     Ca    9.0        24 Jan 2019 15:22    TPro  7.3    /  Alb  4.0    /  TBili  0.6    /  DBili  x      /  AST  21     /  ALT  22     /  AlkPhos  78     24 Jan 2019 15:22    PT/INR - ( 24 Jan 2019 15:22 )   PT: 12.0 sec;   INR: 1.05 ratio         PTT - ( 24 Jan 2019 15:22 )  PTT:31.2 sec  CARDIAC MARKERS ( 24 Jan 2019 15:22 )  <.015 ng/mL / x     / 153 U/L / x     / 6.3 ng/mL      < from: Xray Chest 1 View AP/PA (01.24.19 @ 15:37) >    EXAM:  XR CHEST AP OR PA 1V                            PROCEDURE DATE:  01/24/2019          INTERPRETATION:  Chest pain.    AP chest. Prior 11/23/2018.    No change heart mediastinum. Low lung volumes with discoid atelectasis   left base. No focal consolidation or pleural effusion.    Impression: As above                JACINTO MARTINEZ M.D., ATTENDING RADIOLOGIST  This document has been electronically signed. Jan 24 2019  3:40PM                < end of copied text >      < from: 12 Lead ECG (11.23.18 @ 11:19) >    Ventricular Rate 77 BPM    Atrial Rate 77 BPM    P-R Interval 150 ms    QRS Duration 102 ms    Q-T Interval 400 ms    QTC Calculation(Bezet) 452 ms    P Axis 10 degrees    R Axis -32 degrees    T Axis 10 degrees    Diagnosis Line Normal sinus rhythm  Left axis deviation  Inferior infarct , age undetermined  Abnormal ECG    Confirmed by rehan Dee ([id number]) on 11/23/2018 3:16:47 PM    < end of copied text >

## 2019-01-24 NOTE — ED ADULT NURSE REASSESSMENT NOTE - COMFORT CARE
assisted to bathroom/wait time explained/po fluids offered/repositioned/plan of care explained/warm blanket provided

## 2019-01-24 NOTE — ED ADULT NURSE REASSESSMENT NOTE - COMFORT CARE
assisted to bathroom/side rails up/wait time explained/warm blanket provided/meal provided/repositioned/plan of care explained

## 2019-01-24 NOTE — CONSULT NOTE ADULT - ASSESSMENT
Patient has atypical chest discomfort with no evidence of an acute coronary syndrome or other acute cardiac process. Her electrocardiogram is nondiagnostic and cardiac enzymes are negative. She may have a gastroenterologic process but it stayed Patient has atypical chest discomfort with no evidence of an acute coronary syndrome or other acute cardiac process. Her electrocardiogram is nondiagnostic and cardiac enzymes are negative. She may have a gastroenterologic process    If her next set of cardiac enzymes is unremarkable, she can be discharged home    Arrangements have been made for outpatient evaluation including echocardiography and stress testing    Continue antihypertensive therapy with amlodipine 5 mg twice daily.    Further management will be dependent on her clinical course

## 2019-01-24 NOTE — ED PROVIDER NOTE - CARE PROVIDER_API CALL
Bong Mckenna), Cardiovascular Disease  [address] [place] [address]  Phone: [phone]  Fax: [phone]

## 2019-01-24 NOTE — ED PROVIDER NOTE - PROGRESS NOTE DETAILS
silver (cards) seen eval pt, request 2nd ce at 1900 and d/c if neg upon discharge patient c/o left sided chest wall pain, examined by me, reproducible with palpation, patient admits pain occurred here while getting in and out of bed, ekg done, no change from previous, will f/u with Dr. Mckenna outpatient

## 2019-01-24 NOTE — ED PROVIDER NOTE - NSFOLLOWUPINSTRUCTIONS_ED_ALL_ED_FT
1) Follow-up with your Primary Medical Doctor and Dr. Mckenna. Call today / next business day for prompt follow-up.  2) Return to Emergency room for any worsening or persistent pain, weakness, fever, or any other concerning symptoms.  3) See attached instruction sheets for additional information, including information regarding signs and symptoms to look out for, reasons to seek immediate care and other important instructions.  4) Follow-up with any specialists as discussed / noted as well.

## 2019-01-24 NOTE — ED PROVIDER NOTE - OBJECTIVE STATEMENT
pt c/o chest heaviness since yest, intermittent with mild sob. no fevers, chills, cough, d/n/v, abd pain, edema, calf pain.  pmd - ifrahas  deshaun - romero

## 2019-01-24 NOTE — ED ADULT NURSE NOTE - CHPI ED NUR SYMPTOMS NEG
no syncope/no shortness of breath/no fever/no nausea/no chills/no congestion/no diaphoresis/no dizziness/no vomiting/no back pain

## 2019-01-24 NOTE — ED ADULT NURSE REASSESSMENT NOTE - NS ED NURSE REASSESS COMMENT FT1
EKG completed, per MD Hernandez pain is reproducible on exam. Pt safe to be discharged per MD Hernandez. Pt ambulatory with a steady gait, verbalized understanding of DIs and follow up. IV removed, family at bedside to take pt home.
Pt reports she is having chest pain at this time, reports it came back after eating. MD Hernandez made aware. Repeat EKG to be performed.
Clear

## 2019-01-25 ENCOUNTER — OTHER (OUTPATIENT)
Age: 84
End: 2019-01-25

## 2019-01-26 ENCOUNTER — APPOINTMENT (OUTPATIENT)
Dept: CARDIOLOGY | Facility: CLINIC | Age: 84
End: 2019-01-26
Payer: MEDICARE

## 2019-01-26 PROCEDURE — 93306 TTE W/DOPPLER COMPLETE: CPT

## 2019-01-28 ENCOUNTER — APPOINTMENT (OUTPATIENT)
Dept: CARDIOLOGY | Facility: CLINIC | Age: 84
End: 2019-01-28
Payer: MEDICARE

## 2019-01-28 PROCEDURE — 78452 HT MUSCLE IMAGE SPECT MULT: CPT

## 2019-01-28 PROCEDURE — 93015 CV STRESS TEST SUPVJ I&R: CPT

## 2019-01-28 PROCEDURE — A9500: CPT

## 2019-02-05 ENCOUNTER — NON-APPOINTMENT (OUTPATIENT)
Age: 84
End: 2019-02-05

## 2019-02-05 ENCOUNTER — APPOINTMENT (OUTPATIENT)
Dept: CARDIOLOGY | Facility: CLINIC | Age: 84
End: 2019-02-05
Payer: MEDICARE

## 2019-02-05 VITALS
SYSTOLIC BLOOD PRESSURE: 168 MMHG | WEIGHT: 141 LBS | HEIGHT: 62 IN | HEART RATE: 95 BPM | DIASTOLIC BLOOD PRESSURE: 89 MMHG | BODY MASS INDEX: 25.95 KG/M2 | OXYGEN SATURATION: 96 %

## 2019-02-05 PROCEDURE — 99214 OFFICE O/P EST MOD 30 MIN: CPT

## 2019-02-05 PROCEDURE — 93000 ELECTROCARDIOGRAM COMPLETE: CPT

## 2019-06-17 ENCOUNTER — EMERGENCY (EMERGENCY)
Facility: HOSPITAL | Age: 84
LOS: 1 days | Discharge: HOME | End: 2019-06-17
Attending: EMERGENCY MEDICINE | Admitting: EMERGENCY MEDICINE
Payer: MEDICARE

## 2019-06-17 VITALS
TEMPERATURE: 99 F | RESPIRATION RATE: 14 BRPM | DIASTOLIC BLOOD PRESSURE: 84 MMHG | WEIGHT: 139.99 LBS | SYSTOLIC BLOOD PRESSURE: 154 MMHG | HEART RATE: 84 BPM | OXYGEN SATURATION: 98 %

## 2019-06-17 VITALS
TEMPERATURE: 98 F | HEART RATE: 92 BPM | OXYGEN SATURATION: 98 % | DIASTOLIC BLOOD PRESSURE: 92 MMHG | SYSTOLIC BLOOD PRESSURE: 127 MMHG | RESPIRATION RATE: 14 BRPM

## 2019-06-17 DIAGNOSIS — Z98.89 OTHER SPECIFIED POSTPROCEDURAL STATES: Chronic | ICD-10-CM

## 2019-06-17 LAB
ALBUMIN SERPL ELPH-MCNC: 4.1 G/DL — SIGNIFICANT CHANGE UP (ref 3.3–5)
ALP SERPL-CCNC: 82 U/L — SIGNIFICANT CHANGE UP (ref 40–120)
ALT FLD-CCNC: 22 U/L — SIGNIFICANT CHANGE UP (ref 12–78)
ANION GAP SERPL CALC-SCNC: 6 MMOL/L — SIGNIFICANT CHANGE UP (ref 5–17)
APPEARANCE UR: CLEAR — SIGNIFICANT CHANGE UP
APTT BLD: 29.5 SEC — SIGNIFICANT CHANGE UP (ref 27.5–36.3)
AST SERPL-CCNC: 21 U/L — SIGNIFICANT CHANGE UP (ref 15–37)
BACTERIA # UR AUTO: ABNORMAL
BASOPHILS # BLD AUTO: 0.02 K/UL — SIGNIFICANT CHANGE UP (ref 0–0.2)
BASOPHILS NFR BLD AUTO: 0.3 % — SIGNIFICANT CHANGE UP (ref 0–2)
BILIRUB SERPL-MCNC: 0.6 MG/DL — SIGNIFICANT CHANGE UP (ref 0.2–1.2)
BILIRUB UR-MCNC: NEGATIVE — SIGNIFICANT CHANGE UP
BUN SERPL-MCNC: 22 MG/DL — SIGNIFICANT CHANGE UP (ref 7–23)
CALCIUM SERPL-MCNC: 8.6 MG/DL — SIGNIFICANT CHANGE UP (ref 8.5–10.1)
CHLORIDE SERPL-SCNC: 104 MMOL/L — SIGNIFICANT CHANGE UP (ref 96–108)
CO2 SERPL-SCNC: 27 MMOL/L — SIGNIFICANT CHANGE UP (ref 22–31)
COLOR SPEC: SIGNIFICANT CHANGE UP
CREAT SERPL-MCNC: 0.79 MG/DL — SIGNIFICANT CHANGE UP (ref 0.5–1.3)
DIFF PNL FLD: ABNORMAL
EOSINOPHIL # BLD AUTO: 0 K/UL — SIGNIFICANT CHANGE UP (ref 0–0.5)
EOSINOPHIL NFR BLD AUTO: 0 % — SIGNIFICANT CHANGE UP (ref 0–6)
EPI CELLS # UR: SIGNIFICANT CHANGE UP
GLUCOSE SERPL-MCNC: 114 MG/DL — HIGH (ref 70–99)
GLUCOSE UR QL: NEGATIVE — SIGNIFICANT CHANGE UP
HCT VFR BLD CALC: 39.1 % — SIGNIFICANT CHANGE UP (ref 34.5–45)
HGB BLD-MCNC: 13.1 G/DL — SIGNIFICANT CHANGE UP (ref 11.5–15.5)
IMM GRANULOCYTES NFR BLD AUTO: 0.4 % — SIGNIFICANT CHANGE UP (ref 0–1.5)
INR BLD: 1.05 RATIO — SIGNIFICANT CHANGE UP (ref 0.88–1.16)
KETONES UR-MCNC: NEGATIVE — SIGNIFICANT CHANGE UP
LEUKOCYTE ESTERASE UR-ACNC: ABNORMAL
LYMPHOCYTES # BLD AUTO: 0.84 K/UL — LOW (ref 1–3.3)
LYMPHOCYTES # BLD AUTO: 12.5 % — LOW (ref 13–44)
MCHC RBC-ENTMCNC: 31 PG — SIGNIFICANT CHANGE UP (ref 27–34)
MCHC RBC-ENTMCNC: 33.5 GM/DL — SIGNIFICANT CHANGE UP (ref 32–36)
MCV RBC AUTO: 92.4 FL — SIGNIFICANT CHANGE UP (ref 80–100)
MONOCYTES # BLD AUTO: 0.27 K/UL — SIGNIFICANT CHANGE UP (ref 0–0.9)
MONOCYTES NFR BLD AUTO: 4 % — SIGNIFICANT CHANGE UP (ref 2–14)
NEUTROPHILS # BLD AUTO: 5.57 K/UL — SIGNIFICANT CHANGE UP (ref 1.8–7.4)
NEUTROPHILS NFR BLD AUTO: 82.8 % — HIGH (ref 43–77)
NITRITE UR-MCNC: NEGATIVE — SIGNIFICANT CHANGE UP
NRBC # BLD: 0 /100 WBCS — SIGNIFICANT CHANGE UP (ref 0–0)
PH UR: 7 — SIGNIFICANT CHANGE UP (ref 5–8)
PLATELET # BLD AUTO: 195 K/UL — SIGNIFICANT CHANGE UP (ref 150–400)
POTASSIUM SERPL-MCNC: 4.4 MMOL/L — SIGNIFICANT CHANGE UP (ref 3.5–5.3)
POTASSIUM SERPL-SCNC: 4.4 MMOL/L — SIGNIFICANT CHANGE UP (ref 3.5–5.3)
PROT SERPL-MCNC: 7.3 G/DL — SIGNIFICANT CHANGE UP (ref 6–8.3)
PROT UR-MCNC: NEGATIVE — SIGNIFICANT CHANGE UP
PROTHROM AB SERPL-ACNC: 11.9 SEC — SIGNIFICANT CHANGE UP (ref 10–12.9)
RBC # BLD: 4.23 M/UL — SIGNIFICANT CHANGE UP (ref 3.8–5.2)
RBC # FLD: 13.1 % — SIGNIFICANT CHANGE UP (ref 10.3–14.5)
RBC CASTS # UR COMP ASSIST: ABNORMAL /HPF (ref 0–4)
SODIUM SERPL-SCNC: 137 MMOL/L — SIGNIFICANT CHANGE UP (ref 135–145)
SP GR SPEC: 1 — LOW (ref 1.01–1.02)
TROPONIN I SERPL-MCNC: <.015 NG/ML — SIGNIFICANT CHANGE UP (ref 0.01–0.04)
UROBILINOGEN FLD QL: NEGATIVE — SIGNIFICANT CHANGE UP
WBC # BLD: 6.73 K/UL — SIGNIFICANT CHANGE UP (ref 3.8–10.5)
WBC # FLD AUTO: 6.73 K/UL — SIGNIFICANT CHANGE UP (ref 3.8–10.5)
WBC UR QL: SIGNIFICANT CHANGE UP

## 2019-06-17 PROCEDURE — 81001 URINALYSIS AUTO W/SCOPE: CPT

## 2019-06-17 PROCEDURE — 85730 THROMBOPLASTIN TIME PARTIAL: CPT

## 2019-06-17 PROCEDURE — 70450 CT HEAD/BRAIN W/O DYE: CPT | Mod: 26

## 2019-06-17 PROCEDURE — 70450 CT HEAD/BRAIN W/O DYE: CPT

## 2019-06-17 PROCEDURE — 82962 GLUCOSE BLOOD TEST: CPT

## 2019-06-17 PROCEDURE — 71045 X-RAY EXAM CHEST 1 VIEW: CPT | Mod: 26

## 2019-06-17 PROCEDURE — 84484 ASSAY OF TROPONIN QUANT: CPT

## 2019-06-17 PROCEDURE — 93005 ELECTROCARDIOGRAM TRACING: CPT

## 2019-06-17 PROCEDURE — 99284 EMERGENCY DEPT VISIT MOD MDM: CPT

## 2019-06-17 PROCEDURE — 71045 X-RAY EXAM CHEST 1 VIEW: CPT

## 2019-06-17 PROCEDURE — 85027 COMPLETE CBC AUTOMATED: CPT

## 2019-06-17 PROCEDURE — 80053 COMPREHEN METABOLIC PANEL: CPT

## 2019-06-17 PROCEDURE — 99284 EMERGENCY DEPT VISIT MOD MDM: CPT | Mod: 25

## 2019-06-17 PROCEDURE — 93010 ELECTROCARDIOGRAM REPORT: CPT

## 2019-06-17 PROCEDURE — 87086 URINE CULTURE/COLONY COUNT: CPT

## 2019-06-17 PROCEDURE — 85610 PROTHROMBIN TIME: CPT

## 2019-06-17 PROCEDURE — 36415 COLL VENOUS BLD VENIPUNCTURE: CPT

## 2019-06-17 RX ORDER — SODIUM CHLORIDE 9 MG/ML
1000 INJECTION INTRAMUSCULAR; INTRAVENOUS; SUBCUTANEOUS ONCE
Refills: 0 | Status: COMPLETED | OUTPATIENT
Start: 2019-06-17 | End: 2019-06-17

## 2019-06-17 RX ORDER — MECLIZINE HCL 12.5 MG
1 TABLET ORAL
Qty: 15 | Refills: 0
Start: 2019-06-17

## 2019-06-17 RX ADMIN — SODIUM CHLORIDE 1000 MILLILITER(S): 9 INJECTION INTRAMUSCULAR; INTRAVENOUS; SUBCUTANEOUS at 10:14

## 2019-06-17 NOTE — ED PROVIDER NOTE - CARE PROVIDER_API CALL
Iliana Jaimes)  Neurology  [address] [place] [address]  Phone: [phone]  Fax: [phone]  Follow Up Time:

## 2019-06-17 NOTE — ED ADULT NURSE NOTE - NSIMPLEMENTINTERV_GEN_ALL_ED
Implemented All Fall with Harm Risk Interventions:  [place] to call system. Call bell, personal items and telephone within reach. Instruct patient to call for assistance. Room bathroom lighting operational. Non-slip footwear when patient is off stretcher. Physically safe environment: no spills, clutter or unnecessary equipment. Stretcher in lowest position, wheels locked, appropriate side rails in place. Provide visual cue, wrist band, yellow gown, etc. Monitor gait and stability. Monitor for mental status changes and reorient to person, place, and time. Review medications for side effects contributing to fall risk. Reinforce activity limits and safety measures with patient and family. Provide visual clues: red socks.

## 2019-06-17 NOTE — ED PROVIDER NOTE - PROGRESS NOTE DETAILS
dr saleem paged 10:23 in er aware of pt will see her pt seen and cleared by neurology if orhtostaitcs ok pt given results feels a little dizzy but no other sx able to ambulate

## 2019-06-17 NOTE — ED PROVIDER NOTE - OBJECTIVE STATEMENT
pt is an 85 yo f who has hx of ibs htn thyroid dz recent eval by cards with normal stress and echo by dr tamiko sheppard, was in [facility] unit friday when she reports that she was takig out trash at her apartment and became dizzy light headed and sob with exertion. son took pt to see pmd dr tatiana parker had labs and ekg and pt was told she was ok labs pending. today she became very dizzy at 5 am when she got up to go to bathroom was able to make her way to toilet and then needed to hold wall to make her way back to bed  no trauma no fever no chills no weakness numbness or other complaints. pt is an 85 yo f who has hx of ibs htn thyroid dz recent eval by cards with normal stress and echo by dr tamiko sheppard, was in [facility] until friday when she reports that she was takig out trash at her apartment and became dizzy light headed and sob with exertion. son took pt to see pmd dr tatiana parker had labs and ekg and pt was told she was ok labs pending. today she became very dizzy at 5 am when she got up to go to bathroom was able to make her way to toilet and then needed to hold wall to make her way back to bed  no trauma no fever no chills no weakness numbness or other complaints.

## 2019-06-17 NOTE — ED PROVIDER NOTE - NSFOLLOWUPINSTRUCTIONS_ED_ALL_ED_FT
ANTIVERT FOR DIZZYNESS EVERY 8 HOURS WITH CAUTION WILL MAKE YOU SLEEPY  ZOFRAN (IF PRESCRIBED) FOR NAUSEA AS NEEDED EVERY 8 HOURS  STAY WELL HYDRATED  FOLLOW UP WITH NEUROLOGY  FOLLOW UP WITH YOUR DOCTOR OR THE CLINIC   NO ALCOHOL OR SEDATIVES  DO NOT DRIVE IF YOU ARE DIZZY OR SYMPTOMATIC  RETURN FOR WORSENING SYMPTOMS OR ANY CONCERN

## 2019-06-17 NOTE — ED ADULT NURSE NOTE - OBJECTIVE STATEMENT
Pt presents to the Ed s/p dizziness, denies SOB, chest pain., nausea EKG done, pt placed on cardiac monitor.

## 2019-06-18 LAB
CULTURE RESULTS: SIGNIFICANT CHANGE UP
SPECIMEN SOURCE: SIGNIFICANT CHANGE UP

## 2019-08-13 ENCOUNTER — APPOINTMENT (OUTPATIENT)
Dept: CARDIOLOGY | Facility: CLINIC | Age: 84
End: 2019-08-13
Payer: MEDICARE

## 2019-08-13 ENCOUNTER — NON-APPOINTMENT (OUTPATIENT)
Age: 84
End: 2019-08-13

## 2019-08-13 VITALS
WEIGHT: 151 LBS | SYSTOLIC BLOOD PRESSURE: 179 MMHG | BODY MASS INDEX: 27.79 KG/M2 | HEART RATE: 90 BPM | OXYGEN SATURATION: 97 % | HEIGHT: 62 IN | DIASTOLIC BLOOD PRESSURE: 79 MMHG

## 2019-08-13 PROCEDURE — 99214 OFFICE O/P EST MOD 30 MIN: CPT

## 2019-08-13 PROCEDURE — 93000 ELECTROCARDIOGRAM COMPLETE: CPT

## 2019-12-16 ENCOUNTER — APPOINTMENT (OUTPATIENT)
Dept: CARDIOLOGY | Facility: CLINIC | Age: 84
End: 2019-12-16
Payer: MEDICARE

## 2019-12-16 ENCOUNTER — NON-APPOINTMENT (OUTPATIENT)
Age: 84
End: 2019-12-16

## 2019-12-16 VITALS
DIASTOLIC BLOOD PRESSURE: 82 MMHG | HEART RATE: 73 BPM | WEIGHT: 148 LBS | OXYGEN SATURATION: 94 % | HEIGHT: 62 IN | SYSTOLIC BLOOD PRESSURE: 174 MMHG | BODY MASS INDEX: 27.23 KG/M2

## 2019-12-16 DIAGNOSIS — I34.0 NONRHEUMATIC MITRAL (VALVE) INSUFFICIENCY: ICD-10-CM

## 2019-12-16 DIAGNOSIS — E78.5 HYPERLIPIDEMIA, UNSPECIFIED: ICD-10-CM

## 2019-12-16 PROCEDURE — 93000 ELECTROCARDIOGRAM COMPLETE: CPT

## 2019-12-16 PROCEDURE — 99214 OFFICE O/P EST MOD 30 MIN: CPT

## 2020-06-16 ENCOUNTER — APPOINTMENT (OUTPATIENT)
Dept: CARDIOLOGY | Facility: CLINIC | Age: 85
End: 2020-06-16

## 2020-10-26 ENCOUNTER — NON-APPOINTMENT (OUTPATIENT)
Age: 85
End: 2020-10-26

## 2020-10-26 ENCOUNTER — APPOINTMENT (OUTPATIENT)
Dept: CARDIOLOGY | Facility: CLINIC | Age: 85
End: 2020-10-26
Payer: MEDICARE

## 2020-10-26 VITALS
HEIGHT: 62 IN | BODY MASS INDEX: 27.42 KG/M2 | HEART RATE: 74 BPM | DIASTOLIC BLOOD PRESSURE: 81 MMHG | SYSTOLIC BLOOD PRESSURE: 172 MMHG | OXYGEN SATURATION: 98 % | WEIGHT: 149 LBS

## 2020-10-26 DIAGNOSIS — I10 ESSENTIAL (PRIMARY) HYPERTENSION: ICD-10-CM

## 2020-10-26 DIAGNOSIS — R07.89 OTHER CHEST PAIN: ICD-10-CM

## 2020-10-26 PROCEDURE — 93000 ELECTROCARDIOGRAM COMPLETE: CPT

## 2020-10-26 PROCEDURE — 99214 OFFICE O/P EST MOD 30 MIN: CPT

## 2020-11-18 ENCOUNTER — APPOINTMENT (OUTPATIENT)
Dept: CARDIOLOGY | Facility: CLINIC | Age: 85
End: 2020-11-18

## 2020-11-30 ENCOUNTER — APPOINTMENT (OUTPATIENT)
Dept: OTOLARYNGOLOGY | Facility: CLINIC | Age: 85
End: 2020-11-30
Payer: MEDICARE

## 2020-12-03 ENCOUNTER — APPOINTMENT (OUTPATIENT)
Dept: OTOLARYNGOLOGY | Facility: CLINIC | Age: 85
End: 2020-12-03
Payer: MEDICARE

## 2020-12-03 VITALS
BODY MASS INDEX: 25.4 KG/M2 | DIASTOLIC BLOOD PRESSURE: 80 MMHG | HEIGHT: 62 IN | SYSTOLIC BLOOD PRESSURE: 150 MMHG | WEIGHT: 138 LBS | TEMPERATURE: 97.6 F

## 2020-12-03 DIAGNOSIS — H93.291 OTHER ABNORMAL AUDITORY PERCEPTIONS, RIGHT EAR: ICD-10-CM

## 2020-12-03 DIAGNOSIS — M26.629 ARTHRALGIA OF TEMPOROMANDIBULAR JOINT,: ICD-10-CM

## 2020-12-03 DIAGNOSIS — H90.3 SENSORINEURAL HEARING LOSS, BILATERAL: ICD-10-CM

## 2020-12-03 PROCEDURE — 92557 COMPREHENSIVE HEARING TEST: CPT

## 2020-12-03 PROCEDURE — 99214 OFFICE O/P EST MOD 30 MIN: CPT

## 2020-12-03 PROCEDURE — 92550 TYMPANOMETRY & REFLEX THRESH: CPT

## 2020-12-03 RX ORDER — NEOMYCIN SULFATE, POLYMYXIN B SULFATE, HYDROCORTISONE 3.5; 10000; 1 MG/ML; [USP'U]/ML; MG/ML
1 SOLUTION/ DROPS AURICULAR (OTIC)
Qty: 10 | Refills: 0 | Status: DISCONTINUED | COMMUNITY
Start: 2020-10-28

## 2020-12-03 RX ORDER — METOPROLOL SUCCINATE 50 MG/1
50 TABLET, EXTENDED RELEASE ORAL
Qty: 30 | Refills: 5 | Status: DISCONTINUED | COMMUNITY
Start: 2017-01-05 | End: 2020-12-03

## 2020-12-03 RX ORDER — NEOMYCIN AND POLYMYXIN B SULFATES AND HYDROCORTISONE OTIC 10; 3.5; 1 MG/ML; MG/ML; [USP'U]/ML
3.5-10000-1 SUSPENSION AURICULAR (OTIC)
Qty: 10 | Refills: 0 | Status: DISCONTINUED | COMMUNITY
Start: 2020-11-11

## 2020-12-03 RX ORDER — MOMETASONE FUROATE 1 MG/G
0.1 OINTMENT TOPICAL
Qty: 45 | Refills: 0 | Status: DISCONTINUED | COMMUNITY
Start: 2020-08-19

## 2020-12-03 NOTE — ASSESSMENT
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

## 2020-12-03 NOTE — HISTORY OF PRESENT ILLNESS
[de-identified] : 87 yr old female seen with her son.  c/o clog AD for a few weeks. Denies preceding URI.  Has been using a moist warm cloth.  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

## 2020-12-03 NOTE — PHYSICAL EXAM
[Normal] : mucosa is normal [Midline] : trachea located in midline position [de-identified] : +crackling bilat

## 2021-01-29 NOTE — ED ADULT NURSE NOTE - TEMPLATE LIST FOR HEAD TO TOE ASSESSMENT
1-29-21 @ 9928. Called and spoke with the patient with a reminder call regarding her stress test scheduled on 2-2-21 @ 0730. Instructions given including no caffeine for 12 hours and to hold her Sectral for 12 hours but to bring it with her and not to take her Metformin. She states she will bring her own snack with her. Denies any signs/symptoms of the Covid.   Cherry Jasmine RN
Abdominal Pain, N/V/D

## 2021-02-24 ENCOUNTER — APPOINTMENT (OUTPATIENT)
Dept: CARDIOLOGY | Facility: CLINIC | Age: 86
End: 2021-02-24
Payer: MEDICARE

## 2021-02-24 PROCEDURE — 93306 TTE W/DOPPLER COMPLETE: CPT

## 2021-04-06 ENCOUNTER — APPOINTMENT (OUTPATIENT)
Dept: UROGYNECOLOGY | Facility: CLINIC | Age: 86
End: 2021-04-06
Payer: MEDICARE

## 2021-04-06 VITALS
HEART RATE: 72 BPM | OXYGEN SATURATION: 97 % | WEIGHT: 140 LBS | HEIGHT: 62 IN | TEMPERATURE: 95.4 F | BODY MASS INDEX: 25.76 KG/M2

## 2021-04-06 LAB
BILIRUB UR QL STRIP: NORMAL
CLARITY UR: CLEAR
COLLECTION METHOD: NORMAL
GLUCOSE UR-MCNC: NORMAL
HCG UR QL: 0.2 EU/DL
HGB UR QL STRIP.AUTO: NORMAL
KETONES UR-MCNC: NORMAL
LEUKOCYTE ESTERASE UR QL STRIP: NORMAL
NITRITE UR QL STRIP: NORMAL
PH UR STRIP: 6
PROT UR STRIP-MCNC: NORMAL
SP GR UR STRIP: 1.02

## 2021-04-06 PROCEDURE — 51701 INSERT BLADDER CATHETER: CPT

## 2021-04-06 PROCEDURE — 81003 URINALYSIS AUTO W/O SCOPE: CPT | Mod: QW

## 2021-04-06 PROCEDURE — 99204 OFFICE O/P NEW MOD 45 MIN: CPT | Mod: 25

## 2021-04-06 NOTE — LETTER BODY
[Dear  ___] : Dear  [unfilled], [Dear  ___] : Dear ~KEN, [I had the pleasure of evaluating your patient, [unfilled]. Thank you for referring Ms. [unfilled] for consultation for ___] : I had the pleasure of evaluating your patient, [unfilled]. Thank you for referring Ms. [unfilled] for consultation for [unfilled]. [Attached please find my note.] : Attached please find my note. [Thank you very much for allowing me to participate in the care of this patient. If you have any questions, please do not hesitate to contact me] : Thank you very much for allowing me to participate in the care of this patient. If you have any questions, please do not hesitate to contact me.

## 2021-04-06 NOTE — REASON FOR VISIT
[Questionnaire Received] : Patient questionnaire received [Intake Form Reviewed] : Patient intake form with past medical history, surgical history, family history and social history reviewed today

## 2021-04-06 NOTE — HISTORY OF PRESENT ILLNESS
[Stress Incontinence] : no [Unable To Restrain Bowel Movement] : no [Feelings Of Urinary Urgency] : no [Urinary Tract Infection] : no [] : years ago [Incomplete Emptying Of Stool] : no [de-identified] : sometimes -  few weeks [de-identified] : sometimes - few weeks to months [de-identified] : 2-3 [de-identified] : 1 [FreeTextEntry6] : sometimes [de-identified] : not sexually [FreeTextEntry1] : not voiding enough - during the day, only goes to [place] a few times.\par ROS as per questionnaire.\par pt reports + vaginal dryness

## 2021-04-06 NOTE — PHYSICAL EXAM
[Chaperone Present] : A chaperone was present in the examining room during all aspects of the physical examination [No Acute Distress] : in no acute distress [Well developed] : well developed [Well Nourished] : ~L well nourished [Oriented x3] : oriented to person, place, and time [Warm and Dry] : was warm and dry to touch [Normal Gait] : gait was normal [Normal Appearance] : general appearance was normal [Atrophy] : atrophy [Normal] : normal [Uterine Adnexae] : were not tender and not enlarged [Normal rectal exam] : was normal [Post Void Residual ____ml] : post void residual was [unfilled] ml [Tenderness] : ~T no ~M abdominal tenderness observed [Distended] : not distended [Inguinal LAD] : no adenopathy was noted in the inguinal lymph nodes

## 2021-04-06 NOTE — DISCUSSION/SUMMARY
[FreeTextEntry1] : i reviewed the above findings with the patient and her son. With regards to urinary hesitancy and not voiding enough. We discussed it was likely due to volume intake. We discussed the microscopic hematuria and trace protein. pt reports history of microhematuria - questionable if w/u done. Will send off urinalysis with microscopy, and culture for further evaluation. \par With regards to the vaginal burning. We discussed its likely due to atrophy. She was on a vaginal pill and Premarin in the past. However, she leaves that short-term it did cause some vaginal burning. She is using Replens which is helping. We discussed Estrace and she would like to try such.IUGA  patient information on vaginal estrogen was given to her. I have asked her to follow up in the office in approximately 6-8 weeks. All questions are answered.

## 2021-04-08 ENCOUNTER — NON-APPOINTMENT (OUTPATIENT)
Age: 86
End: 2021-04-08

## 2021-04-19 LAB
APPEARANCE: CLEAR
BACTERIA UR CULT: NORMAL
BACTERIA: NEGATIVE
BILIRUBIN URINE: NEGATIVE
BLOOD URINE: ABNORMAL
COLOR: YELLOW
GLUCOSE QUALITATIVE U: NEGATIVE
HYALINE CASTS: 0 /LPF
KETONES URINE: NEGATIVE
LEUKOCYTE ESTERASE URINE: NEGATIVE
MICROSCOPIC-UA: NORMAL
NITRITE URINE: NEGATIVE
PH URINE: 6.5
PROTEIN URINE: NORMAL
RED BLOOD CELLS URINE: 6 /HPF
SPECIFIC GRAVITY URINE: 1.02
SQUAMOUS EPITHELIAL CELLS: 1 /HPF
UROBILINOGEN URINE: NORMAL
WHITE BLOOD CELLS URINE: 3 /HPF

## 2021-05-05 ENCOUNTER — NON-APPOINTMENT (OUTPATIENT)
Age: 86
End: 2021-05-05

## 2021-05-24 ENCOUNTER — APPOINTMENT (OUTPATIENT)
Dept: UROGYNECOLOGY | Facility: CLINIC | Age: 86
End: 2021-05-24
Payer: MEDICARE

## 2021-05-24 ENCOUNTER — OUTPATIENT (OUTPATIENT)
Dept: OUTPATIENT SERVICES | Facility: HOSPITAL | Age: 86
LOS: 1 days | End: 2021-05-24
Payer: MEDICARE

## 2021-05-24 VITALS — TEMPERATURE: 97.3 F

## 2021-05-24 DIAGNOSIS — Z98.89 OTHER SPECIFIED POSTPROCEDURAL STATES: Chronic | ICD-10-CM

## 2021-05-24 DIAGNOSIS — R31.29 OTHER MICROSCOPIC HEMATURIA: ICD-10-CM

## 2021-05-24 DIAGNOSIS — Z01.818 ENCOUNTER FOR OTHER PREPROCEDURAL EXAMINATION: ICD-10-CM

## 2021-05-24 LAB
BILIRUB UR QL STRIP: NORMAL
CLARITY UR: CLEAR
COLLECTION METHOD: NORMAL
GLUCOSE UR-MCNC: NORMAL
HCG UR QL: 0.2 EU/DL
HGB UR QL STRIP.AUTO: NORMAL
KETONES UR-MCNC: NORMAL
LEUKOCYTE ESTERASE UR QL STRIP: NORMAL
NITRITE UR QL STRIP: NORMAL
PH UR STRIP: 6
PROT UR STRIP-MCNC: NORMAL
SP GR UR STRIP: 1.01

## 2021-05-24 PROCEDURE — 52000 CYSTOURETHROSCOPY: CPT

## 2021-05-25 ENCOUNTER — NON-APPOINTMENT (OUTPATIENT)
Age: 86
End: 2021-05-25

## 2021-05-26 ENCOUNTER — NON-APPOINTMENT (OUTPATIENT)
Age: 86
End: 2021-05-26

## 2021-06-04 ENCOUNTER — APPOINTMENT (OUTPATIENT)
Dept: UROGYNECOLOGY | Facility: CLINIC | Age: 86
End: 2021-06-04
Payer: MEDICARE

## 2021-06-04 DIAGNOSIS — R39.11 HESITANCY OF MICTURITION: ICD-10-CM

## 2021-06-04 PROCEDURE — 99213 OFFICE O/P EST LOW 20 MIN: CPT

## 2021-06-07 NOTE — HISTORY OF PRESENT ILLNESS
[FreeTextEntry1] : Patient presents to office for follow up of urinary hesitancy and vaginal atrophy. Patient states she is doing well. Still feels like she is not urinating enough, however she states she is not drinking any fluids throughout the day. States she feels she is urinating the amount to what she is drinking.  She is using the vaginal estrogen as prescribed and OTC Luvena daily. \par \par Patient states she has also been feeling a vaginal bulge that is starting to become bothersome. Denies pelvic pain, pressure or vaginal bleeding. Denies any new urinary complaints. She is moving her bowels daily with no difficulty. States she had tried a pessary in the past from her GYN and was not able to tolerate having it.

## 2021-06-07 NOTE — DISCUSSION/SUMMARY
[FreeTextEntry1] : 1. Urinary Hesitancy\par - PVR 0ML today\par - Discussed with patient to increase fluid intake as she is likely not drinking enough fluids\par - Discussed voiding techniques to promote complete emptying, including leaning side to side, forward, standing up and sitting back down\par \par 2. Atrophy\par - Continue Vaginal estrogen as prescribed\par - Continue use of vaginal lotion Luvena daily as needed\par \par 3. POP\par - Discussed with patient tx options for POP including doing nothing, pessary or surgery. At this time patient thinks she would like to try another pessary.\par \par Will RTO for IPE or sooner if needed. Patient and son agree to call office with any questions or concerns, verbalized understanding.

## 2021-06-07 NOTE — PHYSICAL EXAM
[No Acute Distress] : in no acute distress [Well developed] : well developed [Good Hygeine] : demonstrates good hygeine [Oriented x3] : oriented to person, place, and time [Normal Mood/Affect] : mood and affect are normal [Warm and Dry] : was warm and dry to touch [Normal Gait] : gait was normal [Vulvar Atrophy] : vulvar atrophy [Labia Majora] : were normal [Labia Minora] : were normal [Normal] : was normal [Atrophy] : atrophy [Cystocele] : a cystocele [Discharge] : a  ~M vaginal discharge was present [Scant] : scant [White] : white [No Bleeding] : there was no active vaginal bleeding [GH ____] : GH [unfilled] [TVL ____] : TVL  [unfilled] [Aa ____] : Aa [unfilled] [Ba ____] : Ba [unfilled] [C ____] : C [unfilled] [Ap ____] : Ap [unfilled] [Post Void Residual ____ml] : post void residual was [unfilled] ml [FreeTextEntry1] : Vaginal exam done by Krysta RAYMOND and Eleni LARES.  [Tenderness] : ~T no ~M abdominal tenderness observed [Distended] : not distended

## 2021-06-11 ENCOUNTER — APPOINTMENT (OUTPATIENT)
Dept: UROGYNECOLOGY | Facility: CLINIC | Age: 86
End: 2021-06-11
Payer: MEDICARE

## 2021-06-11 PROCEDURE — 99214 OFFICE O/P EST MOD 30 MIN: CPT

## 2021-06-11 NOTE — HISTORY OF PRESENT ILLNESS
[FreeTextEntry1] : Patient presents to office for follow up of POP and IPE. Patient states she has been feeling the bulge form the vagina and it has become bothersome. Denies being sexually active and she is not able to perform pessary self care. Patient states she feels she has been emptying her bladder well. States she is still not drinking as much water as she has been advised. States she has intermittent constipation that she manages with OTC Miralax. Patient added she has been also experiencing vaginal burning at times. She is using the vaginal estrogen as prescribed and luvena.

## 2021-06-11 NOTE — PROCEDURE
[Good Fit] : fits well [Pessary Inserted] : inserted [Pessary Washed] : washed [H2O] : H2O [None] : no bleeding [Medication Review] : Medicaiton Review: Patient verbalizes understanding of risks and benefits [Bowel Management] : Bowel Management: patient verbalizes understanding of daily dietary fiber intake [FreeTextEntry1] : IPE [de-identified] : Fit with RS #3, patient reported it to be comfortable, remained intact with sitting, valsalva, walking around and using toilet.  [FreeTextEntry3] : estrogen as prescribed [FreeTextEntry4] : advised to prevent staining with BM by managing with diet, fluids or OTC stool softeners as needed [FreeTextEntry8] : routine PeriCare

## 2021-06-11 NOTE — PHYSICAL EXAM
[No Acute Distress] : in no acute distress [Well developed] : well developed [Good Hygeine] : demonstrates good hygeine [Oriented x3] : oriented to person, place, and time [Normal Mood/Affect] : mood and affect are normal [Warm and Dry] : was warm and dry to touch [Normal Gait] : gait was normal [Vulvar Atrophy] : vulvar atrophy [Labia Majora] : were normal [Labia Minora] : were normal [Normal] : was normal [Atrophy] : atrophy [Cystocele] : a cystocele [Discharge] : a  ~M vaginal discharge was present [Scant] : scant [White] : white [Cheesy] : cheesy [No Bleeding] : there was no active vaginal bleeding [FreeTextEntry1] : Vaginal exam done by Krysta RAYMOND and Eleni LARES.  [Tenderness] : ~T no ~M abdominal tenderness observed [Distended] : not distended [FreeTextEntry4] : prolapse bulging at vaginal opening

## 2021-06-11 NOTE — DISCUSSION/SUMMARY
[FreeTextEntry1] : Discussed plan with patient and son (sunil)\par 1. POP\par - Patient fit with Pessary RS #3\par - Pessary precautions and instructions reviewed, verbalized understanding. \par \par 2. Atrophy\par - Continue Vaginal estrogen as prescribed\par - Continue use of vaginal lotion Luvena daily as needed\par \par 3. Vaginal Discharge\par - AFFIRM test sent to lab\par \par Will RTO for Follow up of POP and pessary check in 2 weeks or sooner if needed. Patient and son agree to call office with any questions or concerns, verbalized understanding.

## 2021-06-14 LAB
CANDIDA VAG CYTO: NOT DETECTED
G VAGINALIS+PREV SP MTYP VAG QL MICRO: NOT DETECTED
T VAGINALIS VAG QL WET PREP: NOT DETECTED

## 2021-06-15 ENCOUNTER — APPOINTMENT (OUTPATIENT)
Dept: GYNECOLOGIC ONCOLOGY | Facility: CLINIC | Age: 86
End: 2021-06-15

## 2021-06-25 ENCOUNTER — APPOINTMENT (OUTPATIENT)
Dept: UROGYNECOLOGY | Facility: CLINIC | Age: 86
End: 2021-06-25
Payer: MEDICARE

## 2021-06-25 PROCEDURE — 99213 OFFICE O/P EST LOW 20 MIN: CPT

## 2021-06-25 NOTE — PHYSICAL EXAM
[No Acute Distress] : in no acute distress [Well developed] : well developed [Good Hygeine] : demonstrates good hygeine [Oriented x3] : oriented to person, place, and time [Normal Mood/Affect] : mood and affect are normal [Warm and Dry] : was warm and dry to touch [Normal Gait] : gait was normal [Vulvar Atrophy] : vulvar atrophy [Labia Majora] : were normal [Labia Minora] : were normal [Atrophy] : atrophy [Cystocele] : a cystocele [Scant] : scant [No Bleeding] : there was no active vaginal bleeding [Anxiety] : patient is anxious [Discharge] : a  ~M vaginal discharge was present [Lakshmi] : yellow [Thin] : thin [Mucoid] : mucoid [Tenderness] : ~T no ~M abdominal tenderness observed [Distended] : not distended [Foul Smelling] : not foul smelling [de-identified] : no visible prolapse or pessary bulging at introitus [FreeTextEntry4] : prolapse bulging at vaginal opening

## 2021-06-25 NOTE — PROCEDURE
[Good Fit] : fits well [Pessary Inserted] : inserted [Pessary Washed] : washed [H2O] : H2O [None] : no bleeding [Medication Review] : Medicaiton Review: Patient verbalizes understanding of risks and benefits [Bowel Management] : Bowel Management: patient verbalizes understanding of daily dietary fiber intake [Refit] : refit is not needed [FreeTextEntry1] : RS #3 [FreeTextEntry3] : estrogen as prescribed Advised OTC Replens vaginal cream PV BIW HS PRN [FreeTextEntry4] : advised to prevent staining with BM by managing with diet, fluids or OTC stool softeners as needed [FreeTextEntry8] : routine PeriCare

## 2021-06-25 NOTE — DISCUSSION/SUMMARY
[FreeTextEntry1] : Discussed plan with patient and son (sunil)\par 1. POP\par - Patient doing well with RS #3\par - Pessary precautions and instructions reviewed, verbalized understanding\par - Advised avoid constipation/straining w/ daily fiber/fluid intake and stool softeners daily PRN\par \par 2. Atrophy\par - Continue Vaginal estrogen as prescribed\par - Continue use of vaginal lotion Replens daily as needed\par \par Will RTO for Follow up of POP and pessary check in 12 weeks or sooner if needed. Patient and son agree to call office with any questions or concerns, verbalized understanding.

## 2021-06-25 NOTE — HISTORY OF PRESENT ILLNESS
[FreeTextEntry1] : Patient w/ known hx POP refitted with RS #3 presents to office accompanied by son, Trell, today for pessary check. Pt is happy with support provided by this pessary and denies any issues with it. Feels empty after voids. States she has intermittent constipation that she manages with OTC Miralax daily. Patient added she has been also experiencing vaginal burning at times and is using the vaginal estrogen twice weekly at bedtime on Mon and Thurs as prescribed and alternates with OTC Replens per vagina without issues.

## 2021-07-01 ENCOUNTER — APPOINTMENT (OUTPATIENT)
Dept: UROGYNECOLOGY | Facility: CLINIC | Age: 86
End: 2021-07-01
Payer: MEDICARE

## 2021-07-01 VITALS — TEMPERATURE: 96.8 F | SYSTOLIC BLOOD PRESSURE: 140 MMHG | DIASTOLIC BLOOD PRESSURE: 82 MMHG

## 2021-07-01 DIAGNOSIS — Z46.89 ENCOUNTER FOR FITTING AND ADJUSTMENT OF OTHER SPECIFIED DEVICES: ICD-10-CM

## 2021-07-01 PROCEDURE — A4562: CPT

## 2021-07-01 PROCEDURE — 99214 OFFICE O/P EST MOD 30 MIN: CPT

## 2021-07-01 NOTE — PHYSICAL EXAM
[No Acute Distress] : in no acute distress [Well developed] : well developed [Well Nourished] : ~L well nourished [Good Hygeine] : demonstrates good hygeine [Oriented x3] : oriented to person, place, and time [Normal Mood/Affect] : mood and affect are normal [Anxiety] : patient is anxious [Warm and Dry] : was warm and dry to touch [Normal Gait] : gait was normal [Vulvar Atrophy] : vulvar atrophy [Labia Majora] : were normal [Labia Minora] : were normal [Atrophy] : atrophy [Cystocele] : a cystocele [Uterine Prolapse] : uterine prolapse [No Bleeding] : there was no active vaginal bleeding [Tenderness] : ~T no ~M abdominal tenderness observed [Distended] : not distended [de-identified] : with Valsalva, +prolapse bulging past pessary at introitus [FreeTextEntry4] : pessary intact in posterior vault

## 2021-07-01 NOTE — DISCUSSION/SUMMARY
[FreeTextEntry1] : x2 weeks pessary check - refitted today with GH LS # 2 3/4 which  remained intact with Valsalva, sitting, standing, ambulating and voiding on toilet w/o any c/o discomforts; However, office with only GH SS pessary in stock, thus GH SS inserted today for trial and pt and pt's son aware may change to GH LS at next visit if needed.\par Instructed pt of possible common side effects w/ pessary use incl increased vaginal d/c, stress incontinence, bleeding, or pessary falling out\par Advised pt and pt's son to call office immediately if any pains, bleeding, urinary or BM discomforts or if pessary falls out\par Con't Estrace cream PV BIW HS and topical Zinc Oxide ointment as barrier protection frequently daily\par Reinforced to pt and pt's son must avoid constipation/straining w/ daily fiber/fluid intake and stool softeners daily PRN\par Instructed pt and pt's son to call the office if any problems or concerns and she verbalized understanding.\par \par

## 2021-07-01 NOTE — END OF VISIT
[Time Spent: ___ minutes] : I have spent [unfilled] minutes of time on the encounter. [FreeTextEntry3] : I have reviewed the above and agree with all pertinent clinical information including history and physical examination and agree with treatment plan.\par

## 2021-07-01 NOTE — PROCEDURE
[Refit] : refit needed [Pessary Inserted] : inserted [Pessary Washed] : washed [H2O] : H2O [None] : no bleeding [Medication Review] : Medicaiton Review: Patient verbalizes understanding of risks and benefits [Fluid Management] : Fluid Management: patient verbalizes understanding 6-10 cups per day [Bowel Management] : Bowel Management: patient verbalizes understanding of daily dietary fiber intake [Bladder Training] : Bladder Training: Patient given information with verbal understanding [Good Fit] : fit is not good [Erosion] : no evidence of erosion [Erythema] : no erythema [Discharge] : no vaginal discharge [Infection] : no evidence of infection [FreeTextEntry1] : RS #3 [de-identified] : GH LS # 2 3/4 which  remained intact with Valsalva, sitting, standing, ambulating and voiding on toilet w/o any c/o discomforts; However, office with GH SS in stock only, thus GH SS inserted today for trial and pt and pt's son aware may change to GH LS at next visit if needed [FreeTextEntry4] : Advised avoid constipation/straining w/ daily fiber/fluid intake and stool softeners, Colace and Miralax, regimen daily PRN [FreeTextEntry8] : Con't daily proper pericare

## 2021-07-01 NOTE — HISTORY OF PRESENT ILLNESS
[FreeTextEntry1] : Pt w/ known hx POP fitted with RS #3 s/p 2 week pessary check last week returns to office today accompanied by son, Trell, c/o past 3-4 days with vaginal discomforts and feeling ball of vaginal tissue bulging out again. Pt anxious and concerned if pessary had fallen out. Denies any abd/back pains, bleeding, urinary or BM discomforts. Pt with decreased cognition and after repeated questioning, she admits she was "squeezing" with BM's d/t constipation and she had not been taking Colace/Miralax BM regimen to avoid constipation as recommended. Pt reports con't applying Estrace cream PV twice weekly and Replens on alternating nights.

## 2021-07-20 ENCOUNTER — APPOINTMENT (OUTPATIENT)
Dept: GYNECOLOGIC ONCOLOGY | Facility: CLINIC | Age: 86
End: 2021-07-20

## 2021-07-30 ENCOUNTER — APPOINTMENT (OUTPATIENT)
Dept: UROGYNECOLOGY | Facility: CLINIC | Age: 86
End: 2021-07-30
Payer: MEDICARE

## 2021-07-30 VITALS — DIASTOLIC BLOOD PRESSURE: 88 MMHG | SYSTOLIC BLOOD PRESSURE: 147 MMHG | TEMPERATURE: 97.6 F | HEART RATE: 89 BPM

## 2021-07-30 LAB
BILIRUB UR QL STRIP: NEGATIVE
CLARITY UR: CLEAR
COLLECTION METHOD: NORMAL
GLUCOSE UR-MCNC: NEGATIVE
HCG UR QL: 0.2 EU/DL
HGB UR QL STRIP.AUTO: NORMAL
KETONES UR-MCNC: NEGATIVE
LEUKOCYTE ESTERASE UR QL STRIP: NEGATIVE
NITRITE UR QL STRIP: NEGATIVE
PH UR STRIP: 6
PROT UR STRIP-MCNC: NEGATIVE
SP GR UR STRIP: 1.02

## 2021-07-30 PROCEDURE — 81003 URINALYSIS AUTO W/O SCOPE: CPT | Mod: QW

## 2021-07-30 PROCEDURE — 99213 OFFICE O/P EST LOW 20 MIN: CPT

## 2021-08-02 ENCOUNTER — NON-APPOINTMENT (OUTPATIENT)
Age: 86
End: 2021-08-02

## 2021-08-02 LAB
APPEARANCE: CLEAR
BACTERIA UR CULT: NORMAL
BACTERIA: NEGATIVE
BILIRUBIN URINE: NEGATIVE
BLOOD URINE: ABNORMAL
CALCIUM OXALATE CRYSTALS: ABNORMAL
COLOR: YELLOW
GLUCOSE QUALITATIVE U: NEGATIVE
HYALINE CASTS: 1 /LPF
KETONES URINE: NEGATIVE
LEUKOCYTE ESTERASE URINE: NEGATIVE
MICROSCOPIC-UA: NORMAL
NITRITE URINE: NEGATIVE
PH URINE: 6.5
PROTEIN URINE: ABNORMAL
RED BLOOD CELLS URINE: 2 /HPF
SPECIFIC GRAVITY URINE: 1.02
SQUAMOUS EPITHELIAL CELLS: 3 /HPF
UROBILINOGEN URINE: NORMAL
WHITE BLOOD CELLS URINE: 2 /HPF

## 2021-08-06 ENCOUNTER — APPOINTMENT (OUTPATIENT)
Dept: UROGYNECOLOGY | Facility: CLINIC | Age: 86
End: 2021-08-06
Payer: MEDICARE

## 2021-08-06 PROCEDURE — 99213 OFFICE O/P EST LOW 20 MIN: CPT

## 2021-08-13 LAB
CANDIDA VAG CYTO: NOT DETECTED
G VAGINALIS+PREV SP MTYP VAG QL MICRO: DETECTED
T VAGINALIS VAG QL WET PREP: NOT DETECTED

## 2021-08-17 ENCOUNTER — NON-APPOINTMENT (OUTPATIENT)
Age: 86
End: 2021-08-17

## 2021-08-24 ENCOUNTER — NON-APPOINTMENT (OUTPATIENT)
Age: 86
End: 2021-08-24

## 2021-08-25 ENCOUNTER — APPOINTMENT (OUTPATIENT)
Dept: UROGYNECOLOGY | Facility: CLINIC | Age: 86
End: 2021-08-25
Payer: MEDICARE

## 2021-08-25 VITALS — TEMPERATURE: 97.1 F

## 2021-08-25 PROCEDURE — 99213 OFFICE O/P EST LOW 20 MIN: CPT

## 2021-08-25 NOTE — DISCUSSION/SUMMARY
[FreeTextEntry1] : # Vaginal Burning:\par - Continue use of Replens daily. Stop use of vaginal estrogen until after follow up with Dr. Harris \par - A&D ointment or Aquaphor to the vulva \par \par # Vaginal Candidiasis\par - F/U affirm test\par - Due to vaginal discharge and symptoms will tx with Diflucan 150mg x 1 dose now, repeat second dose in 3 days\par \par #POP:\par - Patient doing well with pessary GH SS 2 3/4\par - Pessary precautions and instructions reviewed, verbalized understanding\par \par Will RTO for follow up of POP and pessary check in 3 months or sooner if needed.  Instructed pt and pt's son to call the office if any problems or concerns and she verbalized understanding.\par \par

## 2021-08-25 NOTE — HISTORY OF PRESENT ILLNESS
[FreeTextEntry1] : Fide is an 88 year old F with known hx of POP supported by  SS # 2 3/4 presents today complaining of continued vaginal burning.  She states she completed the course of Metronidazole as prescribed last visit for Gardnerella Vaginalis and is using the Replens cream daily.  It was also recommended she try A&D ointment as well as Desitin to the vulva and she states she following as recommended. She denies urinary symptoms, abnormal vaginal bleeding or discharge. She states she has stopped the use of vaginal estrogen as directed until she follows up with Dr. Harris. States she has the apt with Dr. Harris next month. States the vaginal burning is the same as it has been since her initial visit with Dr. William. States she is doing well with pessary. \par \par Of note, the patient's memory seems to be declining and she repeatedly asks the same questions.  She is accompanied by her son.  \par \par

## 2021-08-25 NOTE — PROCEDURE
[Good Fit] : fits well [Erythema] : erythema noted [Discharge] : there is vaginal discharge [Pessary Inserted] : inserted [H2O] : H2O [Pessary Washed] : washed [None] : no bleeding [Medication Review] : Medicaiton Review: Patient verbalizes understanding of risks and benefits [Refit] : refit is not needed [Erosion] : no evidence of erosion [Infection] : no evidence of infection [FreeTextEntry1] : GH SS 2/3 [de-identified] : Mild erythema at posterior vaginal wall [de-identified] : thick clumpy white discharge [FreeTextEntry3] : Replens [FreeTextEntry8] : Routine PeriCare

## 2021-08-25 NOTE — PHYSICAL EXAM
[No Acute Distress] : in no acute distress [Well developed] : well developed [Well Nourished] : ~L well nourished [Good Hygeine] : demonstrates good hygeine [Normal Mood/Affect] : mood and affect are normal [Anxiety] : patient is anxious [Warm and Dry] : was warm and dry to touch [Normal Gait] : gait was normal [Vulvar Atrophy] : vulvar atrophy [Labia Majora] : were normal [Labia Minora] : were normal [Atrophy] : atrophy [Dry Mucosa] : dry mucosa [Discharge] : a  ~M vaginal discharge was present [Moderate] : moderate [White] : white [Thick] : thick [No Bleeding] : there was no active vaginal bleeding [Normal] : normal [Oriented x3] : disoriented to person, place, or time [Tenderness] : ~T no ~M abdominal tenderness observed [Distended] : not distended [FreeTextEntry4] : yellow/white discharge

## 2021-08-26 ENCOUNTER — NON-APPOINTMENT (OUTPATIENT)
Age: 86
End: 2021-08-26

## 2021-09-18 ENCOUNTER — INPATIENT (INPATIENT)
Facility: HOSPITAL | Age: 86
LOS: 0 days | Discharge: HOME | DRG: 641 | End: 2021-09-19
Attending: STUDENT IN AN ORGANIZED HEALTH CARE EDUCATION/TRAINING PROGRAM | Admitting: STUDENT IN AN ORGANIZED HEALTH CARE EDUCATION/TRAINING PROGRAM
Payer: COMMERCIAL

## 2021-09-18 ENCOUNTER — TRANSCRIPTION ENCOUNTER (OUTPATIENT)
Age: 86
End: 2021-09-18

## 2021-09-18 VITALS
DIASTOLIC BLOOD PRESSURE: 77 MMHG | WEIGHT: 130.07 LBS | HEART RATE: 85 BPM | SYSTOLIC BLOOD PRESSURE: 126 MMHG | OXYGEN SATURATION: 96 % | RESPIRATION RATE: 20 BRPM | TEMPERATURE: 98 F | HEIGHT: 62 IN

## 2021-09-18 DIAGNOSIS — E87.1 HYPO-OSMOLALITY AND HYPONATREMIA: ICD-10-CM

## 2021-09-18 DIAGNOSIS — Z98.89 OTHER SPECIFIED POSTPROCEDURAL STATES: Chronic | ICD-10-CM

## 2021-09-18 LAB
ALBUMIN SERPL ELPH-MCNC: 3.6 G/DL — SIGNIFICANT CHANGE UP (ref 3.3–5)
ALBUMIN SERPL ELPH-MCNC: 3.9 G/DL — SIGNIFICANT CHANGE UP (ref 3.3–5)
ALP SERPL-CCNC: 37 U/L — LOW (ref 40–120)
ALP SERPL-CCNC: 40 U/L — SIGNIFICANT CHANGE UP (ref 40–120)
ALT FLD-CCNC: 19 U/L — SIGNIFICANT CHANGE UP (ref 12–78)
ALT FLD-CCNC: 21 U/L — SIGNIFICANT CHANGE UP (ref 12–78)
ANION GAP SERPL CALC-SCNC: 5 MMOL/L — SIGNIFICANT CHANGE UP (ref 5–17)
ANION GAP SERPL CALC-SCNC: 5 MMOL/L — SIGNIFICANT CHANGE UP (ref 5–17)
ANION GAP SERPL CALC-SCNC: 8 MMOL/L — SIGNIFICANT CHANGE UP (ref 5–17)
APPEARANCE UR: CLEAR — SIGNIFICANT CHANGE UP
AST SERPL-CCNC: 18 U/L — SIGNIFICANT CHANGE UP (ref 15–37)
AST SERPL-CCNC: 19 U/L — SIGNIFICANT CHANGE UP (ref 15–37)
BACTERIA # UR AUTO: ABNORMAL
BASOPHILS # BLD AUTO: 0.02 K/UL — SIGNIFICANT CHANGE UP (ref 0–0.2)
BASOPHILS NFR BLD AUTO: 0.3 % — SIGNIFICANT CHANGE UP (ref 0–2)
BILIRUB SERPL-MCNC: 0.8 MG/DL — SIGNIFICANT CHANGE UP (ref 0.2–1.2)
BILIRUB SERPL-MCNC: 0.9 MG/DL — SIGNIFICANT CHANGE UP (ref 0.2–1.2)
BILIRUB UR-MCNC: NEGATIVE — SIGNIFICANT CHANGE UP
BUN SERPL-MCNC: 12 MG/DL — SIGNIFICANT CHANGE UP (ref 7–23)
BUN SERPL-MCNC: 17 MG/DL — SIGNIFICANT CHANGE UP (ref 7–23)
BUN SERPL-MCNC: 9 MG/DL — SIGNIFICANT CHANGE UP (ref 7–23)
CALCIUM SERPL-MCNC: 8.2 MG/DL — LOW (ref 8.5–10.1)
CALCIUM SERPL-MCNC: 8.4 MG/DL — LOW (ref 8.5–10.1)
CALCIUM SERPL-MCNC: 8.7 MG/DL — SIGNIFICANT CHANGE UP (ref 8.5–10.1)
CHLORIDE SERPL-SCNC: 102 MMOL/L — SIGNIFICANT CHANGE UP (ref 96–108)
CHLORIDE SERPL-SCNC: 103 MMOL/L — SIGNIFICANT CHANGE UP (ref 96–108)
CHLORIDE SERPL-SCNC: 93 MMOL/L — LOW (ref 96–108)
CK MB BLD-MCNC: 3.4 % — SIGNIFICANT CHANGE UP (ref 0–3.5)
CK MB CFR SERPL CALC: 5 NG/ML — HIGH (ref 0–3.6)
CK SERPL-CCNC: 146 U/L — SIGNIFICANT CHANGE UP (ref 26–192)
CO2 SERPL-SCNC: 27 MMOL/L — SIGNIFICANT CHANGE UP (ref 22–31)
CO2 SERPL-SCNC: 27 MMOL/L — SIGNIFICANT CHANGE UP (ref 22–31)
CO2 SERPL-SCNC: 28 MMOL/L — SIGNIFICANT CHANGE UP (ref 22–31)
COLOR SPEC: YELLOW — SIGNIFICANT CHANGE UP
CREAT SERPL-MCNC: 0.52 MG/DL — SIGNIFICANT CHANGE UP (ref 0.5–1.3)
CREAT SERPL-MCNC: 0.57 MG/DL — SIGNIFICANT CHANGE UP (ref 0.5–1.3)
CREAT SERPL-MCNC: 0.62 MG/DL — SIGNIFICANT CHANGE UP (ref 0.5–1.3)
DIFF PNL FLD: ABNORMAL
EOSINOPHIL # BLD AUTO: 0.02 K/UL — SIGNIFICANT CHANGE UP (ref 0–0.5)
EOSINOPHIL NFR BLD AUTO: 0.3 % — SIGNIFICANT CHANGE UP (ref 0–6)
EPI CELLS # UR: SIGNIFICANT CHANGE UP
GLUCOSE SERPL-MCNC: 108 MG/DL — HIGH (ref 70–99)
GLUCOSE SERPL-MCNC: 91 MG/DL — SIGNIFICANT CHANGE UP (ref 70–99)
GLUCOSE SERPL-MCNC: 94 MG/DL — SIGNIFICANT CHANGE UP (ref 70–99)
GLUCOSE UR QL: NEGATIVE — SIGNIFICANT CHANGE UP
HCT VFR BLD CALC: 35.1 % — SIGNIFICANT CHANGE UP (ref 34.5–45)
HCT VFR BLD CALC: 36.8 % — SIGNIFICANT CHANGE UP (ref 34.5–45)
HGB BLD-MCNC: 12.4 G/DL — SIGNIFICANT CHANGE UP (ref 11.5–15.5)
HGB BLD-MCNC: 12.8 G/DL — SIGNIFICANT CHANGE UP (ref 11.5–15.5)
IMM GRANULOCYTES NFR BLD AUTO: 0.3 % — SIGNIFICANT CHANGE UP (ref 0–1.5)
KETONES UR-MCNC: ABNORMAL
LEUKOCYTE ESTERASE UR-ACNC: ABNORMAL
LYMPHOCYTES # BLD AUTO: 1.31 K/UL — SIGNIFICANT CHANGE UP (ref 1–3.3)
LYMPHOCYTES # BLD AUTO: 22 % — SIGNIFICANT CHANGE UP (ref 13–44)
MAGNESIUM SERPL-MCNC: 2.1 MG/DL — SIGNIFICANT CHANGE UP (ref 1.6–2.6)
MCHC RBC-ENTMCNC: 30.8 PG — SIGNIFICANT CHANGE UP (ref 27–34)
MCHC RBC-ENTMCNC: 31.4 PG — SIGNIFICANT CHANGE UP (ref 27–34)
MCHC RBC-ENTMCNC: 34.8 GM/DL — SIGNIFICANT CHANGE UP (ref 32–36)
MCHC RBC-ENTMCNC: 35.3 GM/DL — SIGNIFICANT CHANGE UP (ref 32–36)
MCV RBC AUTO: 88.5 FL — SIGNIFICANT CHANGE UP (ref 80–100)
MCV RBC AUTO: 88.9 FL — SIGNIFICANT CHANGE UP (ref 80–100)
MONOCYTES # BLD AUTO: 0.55 K/UL — SIGNIFICANT CHANGE UP (ref 0–0.9)
MONOCYTES NFR BLD AUTO: 9.2 % — SIGNIFICANT CHANGE UP (ref 2–14)
NEUTROPHILS # BLD AUTO: 4.04 K/UL — SIGNIFICANT CHANGE UP (ref 1.8–7.4)
NEUTROPHILS NFR BLD AUTO: 67.9 % — SIGNIFICANT CHANGE UP (ref 43–77)
NITRITE UR-MCNC: NEGATIVE — SIGNIFICANT CHANGE UP
NRBC # BLD: 0 /100 WBCS — SIGNIFICANT CHANGE UP (ref 0–0)
NRBC # BLD: 0 /100 WBCS — SIGNIFICANT CHANGE UP (ref 0–0)
OSMOLALITY SERPL: 271 MOSMOL/KG — LOW (ref 280–301)
PH UR: 7 — SIGNIFICANT CHANGE UP (ref 5–8)
PHOSPHATE SERPL-MCNC: 2.6 MG/DL — SIGNIFICANT CHANGE UP (ref 2.5–4.5)
PLATELET # BLD AUTO: 203 K/UL — SIGNIFICANT CHANGE UP (ref 150–400)
PLATELET # BLD AUTO: 208 K/UL — SIGNIFICANT CHANGE UP (ref 150–400)
POTASSIUM SERPL-MCNC: 3.6 MMOL/L — SIGNIFICANT CHANGE UP (ref 3.5–5.3)
POTASSIUM SERPL-MCNC: 3.7 MMOL/L — SIGNIFICANT CHANGE UP (ref 3.5–5.3)
POTASSIUM SERPL-MCNC: 3.8 MMOL/L — SIGNIFICANT CHANGE UP (ref 3.5–5.3)
POTASSIUM SERPL-SCNC: 3.6 MMOL/L — SIGNIFICANT CHANGE UP (ref 3.5–5.3)
POTASSIUM SERPL-SCNC: 3.7 MMOL/L — SIGNIFICANT CHANGE UP (ref 3.5–5.3)
POTASSIUM SERPL-SCNC: 3.8 MMOL/L — SIGNIFICANT CHANGE UP (ref 3.5–5.3)
PROT SERPL-MCNC: 6.7 G/DL — SIGNIFICANT CHANGE UP (ref 6–8.3)
PROT SERPL-MCNC: 7.2 G/DL — SIGNIFICANT CHANGE UP (ref 6–8.3)
PROT UR-MCNC: NEGATIVE — SIGNIFICANT CHANGE UP
RBC # BLD: 3.95 M/UL — SIGNIFICANT CHANGE UP (ref 3.8–5.2)
RBC # BLD: 4.16 M/UL — SIGNIFICANT CHANGE UP (ref 3.8–5.2)
RBC # FLD: 13 % — SIGNIFICANT CHANGE UP (ref 10.3–14.5)
RBC # FLD: 13.2 % — SIGNIFICANT CHANGE UP (ref 10.3–14.5)
RBC CASTS # UR COMP ASSIST: ABNORMAL /HPF (ref 0–4)
SARS-COV-2 RNA SPEC QL NAA+PROBE: SIGNIFICANT CHANGE UP
SODIUM SERPL-SCNC: 128 MMOL/L — LOW (ref 135–145)
SODIUM SERPL-SCNC: 134 MMOL/L — LOW (ref 135–145)
SODIUM SERPL-SCNC: 136 MMOL/L — SIGNIFICANT CHANGE UP (ref 135–145)
SODIUM UR-SCNC: 83 MMOL/L — SIGNIFICANT CHANGE UP
SP GR SPEC: 1 — LOW (ref 1.01–1.02)
T3 SERPL-MCNC: 68 NG/DL — LOW (ref 80–200)
T4 AB SER-ACNC: 5.6 UG/DL — SIGNIFICANT CHANGE UP (ref 4.6–12)
TROPONIN I SERPL-MCNC: <.015 NG/ML — SIGNIFICANT CHANGE UP (ref 0.01–0.04)
TSH SERPL-MCNC: 4.34 UIU/ML — HIGH (ref 0.36–3.74)
UROBILINOGEN FLD QL: NEGATIVE — SIGNIFICANT CHANGE UP
WBC # BLD: 4.82 K/UL — SIGNIFICANT CHANGE UP (ref 3.8–10.5)
WBC # BLD: 5.96 K/UL — SIGNIFICANT CHANGE UP (ref 3.8–10.5)
WBC # FLD AUTO: 4.82 K/UL — SIGNIFICANT CHANGE UP (ref 3.8–10.5)
WBC # FLD AUTO: 5.96 K/UL — SIGNIFICANT CHANGE UP (ref 3.8–10.5)
WBC UR QL: SIGNIFICANT CHANGE UP

## 2021-09-18 PROCEDURE — 99285 EMERGENCY DEPT VISIT HI MDM: CPT

## 2021-09-18 PROCEDURE — 71045 X-RAY EXAM CHEST 1 VIEW: CPT | Mod: 26

## 2021-09-18 PROCEDURE — 93010 ELECTROCARDIOGRAM REPORT: CPT

## 2021-09-18 PROCEDURE — 99222 1ST HOSP IP/OBS MODERATE 55: CPT | Mod: GC,AI

## 2021-09-18 PROCEDURE — 12345: CPT | Mod: NC

## 2021-09-18 RX ORDER — METOPROLOL TARTRATE 50 MG
25 TABLET ORAL DAILY
Refills: 0 | Status: DISCONTINUED | OUTPATIENT
Start: 2021-09-18 | End: 2021-09-19

## 2021-09-18 RX ORDER — SODIUM CHLORIDE 9 MG/ML
1000 INJECTION INTRAMUSCULAR; INTRAVENOUS; SUBCUTANEOUS ONCE
Refills: 0 | Status: COMPLETED | OUTPATIENT
Start: 2021-09-18 | End: 2021-09-18

## 2021-09-18 RX ORDER — ENOXAPARIN SODIUM 100 MG/ML
40 INJECTION SUBCUTANEOUS DAILY
Refills: 0 | Status: DISCONTINUED | OUTPATIENT
Start: 2021-09-18 | End: 2021-09-19

## 2021-09-18 RX ORDER — LEVOTHYROXINE SODIUM 125 MCG
25 TABLET ORAL DAILY
Refills: 0 | Status: DISCONTINUED | OUTPATIENT
Start: 2021-09-18 | End: 2021-09-19

## 2021-09-18 RX ORDER — SODIUM CHLORIDE 9 MG/ML
1000 INJECTION INTRAMUSCULAR; INTRAVENOUS; SUBCUTANEOUS
Refills: 0 | Status: DISCONTINUED | OUTPATIENT
Start: 2021-09-18 | End: 2021-09-18

## 2021-09-18 RX ORDER — INFLUENZA VIRUS VACCINE 15; 15; 15; 15 UG/.5ML; UG/.5ML; UG/.5ML; UG/.5ML
0.5 SUSPENSION INTRAMUSCULAR ONCE
Refills: 0 | Status: DISCONTINUED | OUTPATIENT
Start: 2021-09-18 | End: 2021-09-19

## 2021-09-18 RX ORDER — LANOLIN ALCOHOL/MO/W.PET/CERES
3 CREAM (GRAM) TOPICAL AT BEDTIME
Refills: 0 | Status: DISCONTINUED | OUTPATIENT
Start: 2021-09-18 | End: 2021-09-19

## 2021-09-18 RX ORDER — AMLODIPINE BESYLATE 2.5 MG/1
5 TABLET ORAL DAILY
Refills: 0 | Status: DISCONTINUED | OUTPATIENT
Start: 2021-09-18 | End: 2021-09-19

## 2021-09-18 RX ORDER — DESMOPRESSIN ACETATE 0.1 MG/1
2 TABLET ORAL ONCE
Refills: 0 | Status: COMPLETED | OUTPATIENT
Start: 2021-09-18 | End: 2021-09-18

## 2021-09-18 RX ORDER — SODIUM CHLORIDE 9 MG/ML
1000 INJECTION, SOLUTION INTRAVENOUS
Refills: 0 | Status: DISCONTINUED | OUTPATIENT
Start: 2021-09-18 | End: 2021-09-19

## 2021-09-18 RX ADMIN — Medication 25 MILLIGRAM(S): at 15:27

## 2021-09-18 RX ADMIN — SODIUM CHLORIDE 1000 MILLILITER(S): 9 INJECTION INTRAMUSCULAR; INTRAVENOUS; SUBCUTANEOUS at 02:34

## 2021-09-18 RX ADMIN — AMLODIPINE BESYLATE 5 MILLIGRAM(S): 2.5 TABLET ORAL at 06:37

## 2021-09-18 RX ADMIN — DESMOPRESSIN ACETATE 2 MICROGRAM(S): 0.1 TABLET ORAL at 15:27

## 2021-09-18 RX ADMIN — SODIUM CHLORIDE 1000 MILLILITER(S): 9 INJECTION INTRAMUSCULAR; INTRAVENOUS; SUBCUTANEOUS at 03:34

## 2021-09-18 RX ADMIN — Medication 25 MICROGRAM(S): at 06:37

## 2021-09-18 RX ADMIN — ENOXAPARIN SODIUM 40 MILLIGRAM(S): 100 INJECTION SUBCUTANEOUS at 11:06

## 2021-09-18 RX ADMIN — SODIUM CHLORIDE 75 MILLILITER(S): 9 INJECTION INTRAMUSCULAR; INTRAVENOUS; SUBCUTANEOUS at 06:38

## 2021-09-18 RX ADMIN — SODIUM CHLORIDE 75 MILLILITER(S): 9 INJECTION, SOLUTION INTRAVENOUS at 11:07

## 2021-09-18 NOTE — PHYSICAL THERAPY INITIAL EVALUATION ADULT - ADDITIONAL COMMENTS
Patient lives in a main level, [facility], with no steps. Patient ambulates in the community. Son assist with shopping and errands as needed. +grab bars in a shower stall. No other DME available.

## 2021-09-18 NOTE — CONSULT NOTE ADULT - SUBJECTIVE AND OBJECTIVE BOX
Patient is a 88y old  Female who presents with a chief complaint of hyponatremia (18 Sep 2021 05:19)    HPI:  87 yo f pmh htn, hypothyroidism, gerd, IBS, previous episode of symptomatic hyponatremia presents with weakness , dizziness and generalized malaise. She was seen by her PCP this week and told that she had hyponatremia, told to fluid restrict. Howver symptoms abruptly worsened and came to the ED. Patient stated that she started feeling unwell and weak with generalized malaise earlier in the day. Pt does not want her son called for further history at this time as he was here late into the evening and is resting.     In the ED cbc, cmp, cardiac enzymes, UA, covid pcr were obtained. cbc wnl. cmp with Na 128. UA with 11-25 rbcs, moderate blood, trace ketones, sm leuk esterase. Covid negative.   Pt was given 1L NS bolus.  CXR: Unremarkable  EKG: NSR 80 bpm QT/QTc 414/477 (18 Sep 2021 05:19)      PAST MEDICAL HISTORY:  Hypothyroid    HTN (hypertension)    Hiatal hernia    Gastroesophageal reflux disease without esophagitis    IBS (irritable bowel syndrome)        PAST SURGICAL HISTORY:  No significant past surgical history    S/P colon resection    S/P inguinal hernia repair    H/O hernia repair        FAMILY HISTORY:      SOCIAL HISTORY:    Allergies    No Known Allergies    Intolerances      Home Medications:  amLODIPine: 5 milligram(s) orally 2 times a day (18 Sep 2021 06:20)  Synthroid: 25 microgram(s) orally once a day. AM (18 Sep 2021 06:20)    MEDICATIONS  (STANDING):  amLODIPine   Tablet 5 milliGRAM(s) Oral daily  dextrose 5%. 1000 milliLiter(s) (75 mL/Hr) IV Continuous <Continuous>  enoxaparin Injectable 40 milliGRAM(s) SubCutaneous daily  levothyroxine 25 MICROGram(s) Oral daily    MEDICATIONS  (PRN):  melatonin 3 milliGRAM(s) Oral at bedtime PRN Insomnia      REVIEW OF SYSTEMS:  General:   Respiratory: No cough, SOB  Cardiovascular: No CP or Palpitations	  Gastrointestinal: No nausea, Vomiting. No diarrhea  Genitourinary: No urinary complaints	  Musculoskeletal: No leg swelling, No new rash or lesions	  Neurological: 	  all other systems negative    T(F): 98.5 (21 @ 06:59), Max: 98.9 (21 @ 05:44)  HR: 82 (21 @ 06:59) (79 - 85)  BP: 166/79 (21 @ 06:59) (126/77 - 166/85)  RR: 18 (21 @ 06:59) (18 - 20)  SpO2: 92% (21 @ 06:59) (92% - 96%)  Wt(kg): --    PHYSICAL EXAM:  General: NAD  Respiratory: b/l air entry  Cardiovascular: S1 S2  Gastrointestinal: soft  Extremities: edema            136  |  103  |  12  ----------------------------<  91  3.7   |  28  |  0.52    Ca    8.4<L>      18 Sep 2021 06:51  Phos  2.6       Mg     2.1         TPro  6.7  /  Alb  3.6  /  TBili  0.8  /  DBili  x   /  AST  18  /  ALT  19  /  AlkPhos  37<L>                            12.4   4.82  )-----------( 203      ( 18 Sep 2021 06:51 )             35.1       Hemoglobin: 12.4 g/dL ( @ 06:51)  Hematocrit: 35.1 % ( @ 06:51)  Potassium, Serum: 3.7 mmol/L ( @ 06:51)  Blood Urea Nitrogen, Serum: 12 mg/dL ( @ 06:51)      Creatinine, Serum: 0.52 ( @ 06:51)  Creatinine, Serum: 0.62 ( @ 02:05)      Urinalysis Basic - ( 18 Sep 2021 01:54 )    Color: Yellow / Appearance: Clear / S.005 / pH: x  Gluc: x / Ketone: Trace  / Bili: Negative / Urobili: Negative   Blood: x / Protein: Negative / Nitrite: Negative   Leuk Esterase: Small / RBC: 11-25 /HPF / WBC 3-5   Sq Epi: x / Non Sq Epi: Few / Bacteria: Occasional      LIVER FUNCTIONS - ( 18 Sep 2021 06:51 )  Alb: 3.6 g/dL / Pro: 6.7 g/dL / ALK PHOS: 37 U/L / ALT: 19 U/L / AST: 18 U/L / GGT: x           CARDIAC MARKERS ( 18 Sep 2021 02:05 )  <.015 ng/mL / x     / 146 U/L / x     / 5.0 ng/mL      Creatine Kinase, Serum: 146 U/L (21 @ 02:05)          I&O's Detail         Patient is a 88y old  Female who presents with a chief complaint of hyponatremia (18 Sep 2021 05:19)    HPI:  87 yo f pmh htn, hypothyroidism, gerd, IBS, previous episode of symptomatic hyponatremia presents with weakness , dizziness and generalized malaise. She was seen by her PCP this week and told that she had hyponatremia, told to fluid restrict. However symptoms abruptly worsened and came to the ED. Patient stated that she started feeling unwell and weak with generalized malaise earlier in the day. Pt does not want her son called for further history at this time as he was here late into the evening and is resting.     In the ED cbc, cmp, cardiac enzymes, UA, covid pcr were obtained. cbc wnl. cmp with Na 128. UA with 11-25 rbcs, moderate blood, trace ketones, sm leuk esterase. Covid negative.   Pt was given 1L NS bolus.  CXR: Unremarkable  EKG: NSR 80 bpm QT/QTc 414/477 (18 Sep 2021 05:19)    Renal consult called for hyponatremia. History obtained from chart and patient.       PAST MEDICAL HISTORY:  Hypothyroid    HTN (hypertension)    Hiatal hernia    Gastroesophageal reflux disease without esophagitis    IBS (irritable bowel syndrome)        PAST SURGICAL HISTORY:  No significant past surgical history    S/P colon resection    S/P inguinal hernia repair    H/O hernia repair        FAMILY HISTORY:      SOCIAL HISTORY: No smoking or alcohol use     Allergies    No Known Allergies    Intolerances      Home Medications:  amLODIPine: 5 milligram(s) orally 2 times a day (18 Sep 2021 06:20)  Synthroid: 25 microgram(s) orally once a day. AM (18 Sep 2021 06:20)    MEDICATIONS  (STANDING):  amLODIPine   Tablet 5 milliGRAM(s) Oral daily  dextrose 5%. 1000 milliLiter(s) (75 mL/Hr) IV Continuous <Continuous>  enoxaparin Injectable 40 milliGRAM(s) SubCutaneous daily  levothyroxine 25 MICROGram(s) Oral daily    MEDICATIONS  (PRN):  melatonin 3 milliGRAM(s) Oral at bedtime PRN Insomnia      REVIEW OF SYSTEMS:  General: no distress  Respiratory: No cough, SOB  Cardiovascular: No CP or Palpitations	  Gastrointestinal: No nausea, Vomiting. No diarrhea  Genitourinary: No urinary complaints	  Musculoskeletal: No new rash or lesions		  all other systems negative    T(F): 98.5 (21 @ 06:59), Max: 98.9 (21 @ 05:44)  HR: 82 (21 @ 06:59) (79 - 85)  BP: 166/79 (21 @ 06:59) (126/77 - 166/85)  RR: 18 (21 @ 06:59) (18 - 20)  SpO2: 92% (21 @ 06:59) (92% - 96%)  Wt(kg): --    PHYSICAL EXAM:  General: NAD  Respiratory: b/l air entry  Cardiovascular: S1 S2  Gastrointestinal: soft  Extremities: no edema, AAO 3            136  |  103  |  12  ----------------------------<  91  3.7   |  28  |  0.52    Ca    8.4<L>      18 Sep 2021 06:51  Phos  2.6       Mg     2.1         T Pro  6.7  /  Alb  3.6  /  T Bili  0.8  /  D Bili  x   /  AST  18  /  ALT  19  /  Alk Phos  37<L>                            12.4   4.82  )-----------( 203      ( 18 Sep 2021 06:51 )             35.1       Hemoglobin: 12.4 g/dL ( @ 06:51)  Hematocrit: 35.1 % ( @ 06:51)  Potassium, Serum: 3.7 mmol/L ( @ 06:51)  Blood Urea Nitrogen, Serum: 12 mg/dL ( @ 06:51)      Creatinine, Serum: 0.52 ( @ 06:51)  Creatinine, Serum: 0.62 ( @ 02:05)      Urinalysis Basic - ( 18 Sep 2021 01:54 )    Color: Yellow / Appearance: Clear / S.005 / pH: x  Gluc: x / Ketone: Trace  / Bili: Negative / Urobili: Negative   Blood: x / Protein: Negative / Nitrite: Negative   Leuk Esterase: Small / RBC: 11-25 /HPF / WBC 3-5   Sq Epi: x / Non Sq Epi: Few / Bacteria: Occasional      LIVER FUNCTIONS - ( 18 Sep 2021 06:51 )  Alb: 3.6 g/dL / Pro: 6.7 g/dL / ALK PHOS: 37 U/L / ALT: 19 U/L / AST: 18 U/L / GGT: x           CARDIAC MARKERS ( 18 Sep 2021 02:05 )  <.015 ng/mL / x     / 146 U/L / x     / 5.0 ng/mL      Creatine Kinase, Serum: 146 U/L (21 @ 02:05)

## 2021-09-18 NOTE — DISCHARGE NOTE PROVIDER - CARE PROVIDER_API CALL
Eelno Hutton (MD)  Family Medicine  [address] [place] [address]  Phone: [phone]  Fax: [phone]  Follow Up Time:    Eleno Hutton)  Family Medicine  [address] [place] [address]  Phone: [phone]  Fax: [phone]  Follow Up Time:     Godfrey Bernal  [facility]  [address], [address] [place] [address]  Phone: [phone]  Fax: [phone]  Follow Up Time: 1 week   Eleno Hutton)  Family Medicine  [address] [place] [address]  Phone: [phone]  Fax: [phone]  Follow Up Time: 1 week    Godfrey Bernal  [facility]  [address] [facility], [address] [place] [address]  Phone: [phone]  Fax: [phone]  Follow Up Time: 1 week

## 2021-09-18 NOTE — DISCHARGE NOTE PROVIDER - PROVIDER TOKENS
PROVIDER:[TOKEN:[[id number]]] PROVIDER:[TOKEN:[[id number]]],PROVIDER:[TOKEN:[[id number]],FOLLOWUP:[1 week]] PROVIDER:[TOKEN:[[id number]],FOLLOWUP:[1 week]],PROVIDER:[TOKEN:[[id number]],FOLLOWUP:[1 week]]

## 2021-09-18 NOTE — DISCHARGE NOTE PROVIDER - HOSPITAL COURSE
FROM ADMISSION H+P:   HPI:  89 yo f pmh htn, hypothyroidism, gerd, IBS, previous episode of symptomatic hyponatremia presents with weakness , dizziness and generalized malaise. She was seen by her PCP this week and told that she had hyponatremia, told to fluid restrict. Howver symptoms abruptly worsened and came to the ED. Patient stated that she started feeling unwell and weak with generalized malaise earlier in the day. Pt does not want her son called for further history at this time as he was here late into the evening and is resting.     In the ED cbc, cmp, cardiac enzymes, UA, covid pcr were obtained. cbc wnl. cmp with Na 128. UA with 11-25 rbcs, moderate blood, trace ketones, sm leuk esterase. Covid negative.   Pt was given 1L NS bolus.  CXR: Unremarkable  EKG: NSR 80 bpm QT/QTc 414/477 (18 Sep 2021 05:19)      ---  HOSPITAL COURSE: Patient admitted for weakness, dizziness likely 2/2 hyponatremia. Patients sodium resolved within hours of admission. Electrolytes wnl. TSH noted to be elevated at 4.34.   Patient was medically optimized and improved clinically throughout hospital course. Patient seen and examined on day of discharge. Patient is medically stable for discharge to ______ with outpatient follow up.       updated     ---  CONSULTANTS:   Nephrology Dr Bernal     ---  TIME SPENT:  I, the attending physician, was physically present for the key portions of the evaluation and management (E/M) service provided. The total amount of time spent reviewing the hospital notes, laboratory values, imaging findings, assessing/counseling the patient, discussing with consultant physicians, social work, nursing staff was -- minutes    ---  Primary care provider was made aware of plan for discharge:      [  ] NO     [ x ] YES     FROM ADMISSION H+P:   HPI:  87 yo f pmh htn, hypothyroidism, gerd, IBS, previous episode of symptomatic hyponatremia presents with weakness , dizziness and generalized malaise. She was seen by her PCP this week and told that she had hyponatremia, told to fluid restrict. Howver symptoms abruptly worsened and came to the ED. Patient stated that she started feeling unwell and weak with generalized malaise earlier in the day. Pt does not want her son called for further history at this time as he was here late into the evening and is resting.     In the ED cbc, cmp, cardiac enzymes, UA, covid pcr were obtained. cbc wnl. cmp with Na 128. UA with 11-25 rbcs, moderate blood, trace ketones, sm leuk esterase. Covid negative.   Pt was given 1L NS bolus.  CXR: Unremarkable  EKG: NSR 80 bpm QT/QTc 414/477 (18 Sep 2021 05:19)      ---  HOSPITAL COURSE: Patient admitted for weakness, dizziness likely 2/2 hyponatremia. Patients sodium resolved within hours of admission. Electrolytes wnl. TSH noted to be elevated at 4.34. Orthostatics were _________ .   Patient was medically optimized and improved clinically throughout hospital course. Patient seen and examined on day of discharge. Patient is medically stable for discharge to ______ with outpatient follow up.       ---  CONSULTANTS:   Nephrology Dr Bernal     ---  TIME SPENT:  I, the attending physician, was physically present for the key portions of the evaluation and management (E/M) service provided. The total amount of time spent reviewing the hospital notes, laboratory values, imaging findings, assessing/counseling the patient, discussing with consultant physicians, social work, nursing staff was -- minutes    ---  Primary care provider was made aware of plan for discharge:      [  ] NO     [ x ] YES     FROM ADMISSION H+P:   HPI:  87 yo f pmh htn, hypothyroidism, gerd, IBS, previous episode of symptomatic hyponatremia presents with weakness , dizziness and generalized malaise. She was seen by her PCP this week and told that she had hyponatremia, told to fluid restrict. Howver symptoms abruptly worsened and came to the ED. Patient stated that she started feeling unwell and weak with generalized malaise earlier in the day. Pt does not want her son called for further history at this time as he was here late into the evening and is resting.     In the ED cbc, cmp, cardiac enzymes, UA, covid pcr were obtained. cbc wnl. cmp with Na 128. UA with 11-25 rbcs, moderate blood, trace ketones, sm leuk esterase. Covid negative.   Pt was given 1L NS bolus.  CXR: Unremarkable  EKG: NSR 80 bpm QT/QTc 414/477 (18 Sep 2021 05:19)      ---  HOSPITAL COURSE: Patient admitted for weakness, dizziness likely 2/2 hyponatremia. Patients sodium resolved rather quickly. Nephrology recommended DDAVP. Electrolytes wnl. TSH noted to be elevated at 4.34, T3 low 68, t4 wnl. Orthostatics were _________ . Patient started on D5 for IVF. Bladder scan showed ________.    Patient was medically optimized and improved clinically throughout hospital course. Patient seen and examined on day of discharge. Patient is medically stable for discharge to ______ with outpatient follow up.       ---  CONSULTANTS:   Nephrology Dr Bernal     ---  TIME SPENT:  I, the attending physician, was physically present for the key portions of the evaluation and management (E/M) service provided. The total amount of time spent reviewing the hospital notes, laboratory values, imaging findings, assessing/counseling the patient, discussing with consultant physicians, social work, nursing staff was -- minutes    ---  Primary care provider was made aware of plan for discharge:      [  ] NO     [ x ] YES     FROM ADMISSION H+P:   HPI:  87 yo f pmh htn, hypothyroidism, gerd, IBS, previous episode of symptomatic hyponatremia presents with weakness , dizziness and generalized malaise. She was seen by her PCP this week and told that she had hyponatremia, told to fluid restrict. Howver symptoms abruptly worsened and came to the ED. Patient stated that she started feeling unwell and weak with generalized malaise earlier in the day. Pt does not want her son called for further history at this time as he was here late into the evening and is resting.     In the ED cbc, cmp, cardiac enzymes, UA, covid pcr were obtained. cbc wnl. cmp with Na 128. UA with 11-25 rbcs, moderate blood, trace ketones, sm leuk esterase. Covid negative.   Pt was given 1L NS bolus.  CXR: Unremarkable  EKG: NSR 80 bpm QT/QTc 414/477 (18 Sep 2021 05:19)      ---  HOSPITAL COURSE: Patient admitted for weakness, dizziness likely 2/2 hyponatremia. Patients sodium resolved rather quickly. Nephrology recommended DDAVP. Electrolytes wnl. TSH noted to be elevated at 4.34, T3 low 68, t4 wnl. Orthostatics were negative. Patient started on D5 for IVF. Bladder scan showed ________.  Urine lytes ________.   Patient was medically optimized and improved clinically throughout hospital course. Patient seen and examined on day of discharge. Patient is medically stable for discharge to ______ with outpatient follow up.       updated 9/18    ---  CONSULTANTS:   Nephrology Dr Bernal     ---  TIME SPENT:  I, the attending physician, was physically present for the key portions of the evaluation and management (E/M) service provided. The total amount of time spent reviewing the hospital notes, laboratory values, imaging findings, assessing/counseling the patient, discussing with consultant physicians, social work, nursing staff was -- minutes    ---  Primary care provider was made aware of plan for discharge:      [  ] NO     [ x ] YES     FROM ADMISSION H+P:   HPI:  89 yo f pmh htn, hypothyroidism, gerd, IBS, previous episode of symptomatic hyponatremia presents with weakness, dizziness, and generalized malaise. She was seen by her PCP this week and told that she had hyponatremia, told to fluid restrict. Howver symptoms abruptly worsened and came to the ED. Patient stated that she started feeling unwell and weak with generalized malaise earlier in the day. Pt does not want her son called for further history at this time as he was here late into the evening and is resting.     In the ED cbc, cmp, cardiac enzymes, UA, covid pcr were obtained. cbc wnl. cmp with Na 128. UA with 11-25 rbcs, moderate blood, trace ketones, sm leuk esterase. Covid negative.   Pt was given 1L NS bolus.  CXR: Unremarkable  EKG: NSR 80 bpm QT/QTc 414/477 (18 Sep 2021 05:19)      ---  HOSPITAL COURSE: Patient admitted for weakness, dizziness likely 2/2 hyponatremia. Patients sodium resolved rather quickly. Nephrology recommended DDAVP, with improvement of Na to 133. Electrolytes wnl. TSH noted to be elevated at 4.34, T3 low 68, t4 wnl. Pt's synthroid dose was increased to 37.5 mg daily. Orthostatics were negative. Patient started on D5 for IVF. Bladder scan showed ________.  Urine lytes ________.   Patient was medically optimized and improved clinically throughout hospital course. Patient seen and examined on day of discharge. Patient is medically stable for discharge to ______ with outpatient follow up.       updated 9/18    ---  CONSULTANTS:   Nephrology Dr Bernal     ---  TIME SPENT:  I, the attending physician, was physically present for the key portions of the evaluation and management (E/M) service provided. The total amount of time spent reviewing the hospital notes, laboratory values, imaging findings, assessing/counseling the patient, discussing with consultant physicians, social work, nursing staff was -- minutes    ---  Primary care provider was made aware of plan for discharge:      [  ] NO     [ x ] YES     FROM ADMISSION H+P:   HPI:  89 yo f pmh htn, hypothyroidism, gerd, IBS, previous episode of symptomatic hyponatremia presents with weakness, dizziness, and generalized malaise. She was seen by her PCP this week and told that she had hyponatremia, told to fluid restrict. Howver symptoms abruptly worsened and came to the ED. Patient stated that she started feeling unwell and weak with generalized malaise earlier in the day. Pt does not want her son called for further history at this time as he was here late into the evening and is resting.     In the ED cbc, cmp, cardiac enzymes, UA, covid pcr were obtained. cbc wnl. cmp with Na 128. UA with 11-25 rbcs, moderate blood, trace ketones, sm leuk esterase. Covid negative.   Pt was given 1L NS bolus.  CXR: Unremarkable  EKG: NSR 80 bpm QT/QTc 414/477 (18 Sep 2021 05:19)      ---  HOSPITAL COURSE: Patient admitted for weakness, dizziness likely 2/2 hyponatremia. Patients sodium resolved rather quickly. Nephrology recommended DDAVP, with improvement of Na to 133. Electrolytes wnl. TSH noted to be elevated at 4.34, T3 low 68, t4 wnl. Pt's synthroid dose was increased to 37.5 mg daily. Orthostatics were negative. Patient started on D5 for IVF. Patient was medically optimized and improved clinically throughout hospital course. Patient seen and examined on day of discharge. Patient is medically stable for discharge to ______ with outpatient follow up.     Vital Signs Last 24 Hrs  T(C): 36.7 (19 Sep 2021 04:47), Max: 37.1 (18 Sep 2021 20:17)  HR: 88 (19 Sep 2021 08:00) (57 - 88)  BP: 162/89 (19 Sep 2021 08:00) (126/69 - 164/90)  RR: 18 (19 Sep 2021 04:47) (18 - 18)  SpO2: 93% (19 Sep 2021 04:47) (93% - 97%)    T(C): 36.7 (09-19-21 @ 04:47), Max: 37.1 (09-18-21 @ 20:17)  HR: 88 (09-19-21 @ 08:00) (57 - 88)  BP: 162/89 (09-19-21 @ 08:00) (126/69 - 164/90)  RR: 18 (09-19-21 @ 04:47) (18 - 18)  SpO2: 93% (09-19-21 @ 04:47) (93% - 97%)    General: No apparent distress  Head: normocephalic, atraumatic  Eyes: EOMI, anicteric  ENT: moist mucous membranes, no pharyngeal exudates  Heart: rrr, S1, S2, no murmurs  Chest: CTA b/l, no rales, rhonchi, or wheezes  Abd: BS+, soft, NT, ND  Back: no CVA tenderness  Extr: no edema or cyanosis  Neuro: AA&Ox3, no focal weakness, sensation to light touch intact  Psych: normal affect    ---  CONSULTANTS:   Nephrology Dr Bernal     ---  TIME SPENT:  I, the attending physician, was physically present for the key portions of the evaluation and management (E/M) service provided. The total amount of time spent reviewing the hospital notes, laboratory values, imaging findings, assessing/counseling the patient, discussing with consultant physicians, social work, nursing staff was -- minutes    ---  Primary care provider was made aware of plan for discharge:      [  ] NO     [ x ] YES     FROM ADMISSION H+P:   HPI:  87 yo f pmh htn, hypothyroidism, gerd, IBS, previous episode of symptomatic hyponatremia presents with weakness, dizziness, and generalized malaise. She was seen by her PCP this week and told that she had hyponatremia, told to fluid restrict. Howver symptoms abruptly worsened and came to the ED. Patient stated that she started feeling unwell and weak with generalized malaise earlier in the day. Pt does not want her son called for further history at this time as he was here late into the evening and is resting.     In the ED cbc, cmp, cardiac enzymes, UA, covid pcr were obtained. cbc wnl. cmp with Na 128. UA with 11-25 rbcs, moderate blood, trace ketones, sm leuk esterase. Covid negative.   Pt was given 1L NS bolus.  CXR: Unremarkable  EKG: NSR 80 bpm QT/QTc 414/477 (18 Sep 2021 05:19)      ---  HOSPITAL COURSE: Patient admitted for weakness, dizziness likely 2/2 hyponatremia. Patient's sodium resolved rather quickly. Nephrology recommended DDAVP, with improvement of Na to 133. Electrolytes wnl. TSH noted to be elevated at 4.34, T3 low 68, t4 wnl. Patient's synthroid dose was increased to 37.5 mcg daily. Orthostatics were negative. Patient started on D5 for IVF. Patient was medically optimized and improved clinically throughout hospital course. Patient seen and examined on day of discharge. Patient is medically stable for discharge to home with outpatient follow up.     Vital Signs Last 24 Hrs  T(C): 36.7 (19 Sep 2021 04:47), Max: 37.1 (18 Sep 2021 20:17)  HR: 88 (19 Sep 2021 08:00) (57 - 88)  BP: 162/89 (19 Sep 2021 08:00) (126/69 - 164/90)  RR: 18 (19 Sep 2021 04:47) (18 - 18)  SpO2: 93% (19 Sep 2021 04:47) (93% - 97%)    T(C): 36.7 (09-19-21 @ 04:47), Max: 37.1 (09-18-21 @ 20:17)  HR: 88 (09-19-21 @ 08:00) (57 - 88)  BP: 162/89 (09-19-21 @ 08:00) (126/69 - 164/90)  RR: 18 (09-19-21 @ 04:47) (18 - 18)  SpO2: 93% (09-19-21 @ 04:47) (93% - 97%)    General: No apparent distress  Head: normocephalic, atraumatic  Eyes: EOMI, anicteric  ENT: moist mucous membranes, no pharyngeal exudates  Heart: rrr, S1, S2, no murmurs  Chest: CTA b/l, no rales, rhonchi, or wheezes  Abd: BS+, soft, NT, ND  Back: no CVA tenderness  Extr: no edema or cyanosis  Neuro: AA&Ox3, no focal weakness, sensation to light touch intact  Psych: normal affect    ---  CONSULTANTS:   Nephrology Dr Bernal     ---  TIME SPENT:  I, the attending physician, was physically present for the key portions of the evaluation and management (E/M) service provided. The total amount of time spent reviewing the hospital notes, laboratory values, imaging findings, assessing/counseling the patient, discussing with consultant physicians, social work, nursing staff was -- minutes    ---  Primary care provider was made aware of plan for discharge:      [  ] NO     [ x ] YES     FROM ADMISSION H+P:   HPI:  89 yo f pmh htn, hypothyroidism, gerd, IBS, previous episode of symptomatic hyponatremia presents with weakness, dizziness, and generalized malaise. She was seen by her PCP this week and told that she had hyponatremia, told to fluid restrict. However symptoms abruptly worsened and came to the ED. Patient stated that she started feeling unwell and weak with generalized malaise earlier in the day. Pt does not want her son called for further history at this time as he was here late into the evening and is resting.     In the ED cbc, cmp, cardiac enzymes, UA, covid pcr were obtained. cbc wnl. cmp with Na 128. UA with 11-25 rbcs, moderate blood, trace ketones, sm leuk esterase. Covid negative.   Pt was given 1L NS bolus.  CXR: Unremarkable  EKG: NSR 80 bpm QT/QTc 414/477 (18 Sep 2021 05:19)      ---  HOSPITAL COURSE: Patient admitted for weakness, dizziness likely 2/2 hyponatremia. Patient's sodium resolved rather quickly. Nephrology recommended DDAVP, with improvement of Na to 133. Electrolytes wnl. TSH noted to be elevated at 4.34, T3 low 68, t4 wnl. Patient's synthroid dose was increased to 37.5 mcg daily. Orthostatics were negative. Patient started on D5 for IVF. Patient was medically optimized and improved clinically throughout hospital course. PT evaluated patient, recommend no home care needs. Patient seen and examined on day of discharge. Patient is medically stable for discharge to home with outpatient follow up.     Vital Signs Last 24 Hrs  T(C): 36.7 (19 Sep 2021 04:47), Max: 37.1 (18 Sep 2021 20:17)  HR: 88 (19 Sep 2021 08:00) (57 - 88)  BP: 162/89 (19 Sep 2021 08:00) (126/69 - 164/90)  RR: 18 (19 Sep 2021 04:47) (18 - 18)  SpO2: 93% (19 Sep 2021 04:47) (93% - 97%)    T(C): 36.7 (09-19-21 @ 04:47), Max: 37.1 (09-18-21 @ 20:17)  HR: 88 (09-19-21 @ 08:00) (57 - 88)  BP: 162/89 (09-19-21 @ 08:00) (126/69 - 164/90)  RR: 18 (09-19-21 @ 04:47) (18 - 18)  SpO2: 93% (09-19-21 @ 04:47) (93% - 97%)    General: No apparent distress  Head: normocephalic, atraumatic  Eyes: EOMI, anicteric  ENT: moist mucous membranes, no pharyngeal exudates  Heart: rrr, S1, S2, no murmurs  Chest: CTA b/l, no rales, rhonchi, or wheezes  Abd: BS+, soft, NT, ND  Back: no CVA tenderness  Extr: no edema or cyanosis  Neuro: AA&Ox3, no focal weakness, sensation to light touch intact  Psych: normal affect    ---  CONSULTANTS:   Nephrology Dr Bernal     ---  TIME SPENT:  I, the attending physician, was physically present for the key portions of the evaluation and management (E/M) service provided. The total amount of time spent reviewing the hospital notes, laboratory values, imaging findings, assessing/counseling the patient, discussing with consultant physicians, social work, nursing staff was 35 minutes    ---

## 2021-09-18 NOTE — H&P ADULT - ASSESSMENT
89 yo f pmh htn, hypothyroidism, gerd, IBS, previous episode of symptomatic hyponatremia presents with weakness , dizziness and generalized malaise.   admit for symptomatic hyponatremia     #Hyponatremia  - pt with previous episode of symptomatic hyponatremia.   - pt previously fluid restricting at home since diagnosed with hyponatremia on wednesday  - now s/p 1 L NS, appears hypovolemic on exam. continue with gentle fluid resuscitation. trend bmp  - f/u urine lytes/osmolality   - nephrology consulted     #Dizziness/malaise   - suspect patients presenting symptoms are secondary to symptomatic hyponatremia however will evaluate for alternative etiology   - no focal deficits on exam. doubt cva   - medications reviewed   - check orthostatic vital signs   - check tfts , mg, phos   - cardiac enzymes negative. EKG:   - afebrile, no leukoctyosis. covid pcr neg. check rvp   - s/p meclizine and zofran in ED. will continue   - oob with assist, fall precuations     #Hypothyroidism  - check tfts   -resume synthroid     #HTN  - bp wnl   - continue home..     #GERD  - continue home ppi    #Prophylactic Measure   - lovenox 40 QD 87 yo f pmh htn, hypothyroidism, gerd, IBS, previous episode of symptomatic hyponatremia presents with weakness , dizziness and generalized malaise.   admit for symptomatic hyponatremia     #Hyponatremia  - pt with previous episode of symptomatic hyponatremia.   - Admit to general medicine  - pt previously fluid restricting at home since diagnosed with hyponatremia on wednesday  - now s/p 1 L NS, appears hypovolemic on exam. continue with gentle fluid resuscitation. trend bmp  - f/u urine lytes/osmolality   - nephrology consulted Dr. Bernal, f/u recs    #Dizziness/malaise   - suspect patients presenting symptoms are secondary to symptomatic hyponatremia however will evaluate for alternative etiology   - no focal deficits on exam. doubt cva   - medications reviewed   - check orthostatic vital signs   - check tfts, mg, phos   - cardiac enzymes negative. EKG: NSR 80 bpm  - afebrile, no leukoctyosis. covid pcr neg. check rvp   - s/p NS bolus x1  - oob with assist, fall precuations     #Hypothyroidism  - check tfts   -resume synthroid     #HTN  - bp wnl   - continue home amlodipine w/ hold parameters in place    #Prophylactic Measure   - lovenox 40 QD

## 2021-09-18 NOTE — H&P ADULT - NSHPREVIEWOFSYSTEMS_GEN_ALL_CORE
REVIEW OF SYSTEMS:    CONSTITUTIONAL: No fevers or chills. Admits to feeling fatigued  EYES/ENT: No visual changes;  No vertigo or throat pain   RESPIRATORY: No cough, wheezing, hemoptysis; No shortness of breath  CARDIOVASCULAR: No chest pain or palpitations  GASTROINTESTINAL: No abdominal or epigastric pain. No vomiting, or hematemesis; No diarrhea or constipation. No melena or hematochezia. Admits to nausea  GENITOURINARY: No dysuria, or hematuria. Admits to increased frequency (likely due to receiving fluids in ED)  NEUROLOGICAL: No numbness or weakness  EXTREMITY: admits to LE cramping

## 2021-09-18 NOTE — DISCHARGE NOTE PROVIDER - NSDCMRMEDTOKEN_GEN_ALL_CORE_FT
amLODIPine: 5 milligram(s) orally 2 times a day  Synthroid: 25 microgram(s) orally once a day. AM   amLODIPine: 5 milligram(s) orally 2 times a day  Metoprolol Succinate ER 25 mg oral tablet, extended release: 1 tab(s) orally once a day  Synthroid: 25 microgram(s) orally once a day. AM  Synthroid 75 mcg (0.075 mg) oral tablet: 0.5 tab(s) orally once a day    amLODIPine: 5 milligram(s) orally 2 times a day  Metoprolol Succinate ER 25 mg oral tablet, extended release: 1 tab(s) orally once a day  Synthroid 75 mcg (0.075 mg) oral tablet: 0.5 tab(s) orally once a day    amLODIPine: 5 milligram(s) orally 2 times a day  levothyroxine 25 mcg (0.025 mg) oral tablet: 1.5 tab(s) orally once a day   Metoprolol Succinate ER 25 mg oral tablet, extended release: 1 tab(s) orally once a day

## 2021-09-18 NOTE — ED PROVIDER NOTE - CROS ED ENDOCRINE ALL NEG
Post-Care Instructions: I reviewed with the patient in detail post-care instructions. Patient is to keep the biopsy site dry overnight, and then apply bacitracin twice daily until healed. Patient may apply hydrogen peroxide soaks to remove any crusting. Suture removal 1 week. negative...

## 2021-09-18 NOTE — ED PROVIDER NOTE - CHPI ED SYMPTOMS NEG
no blurred vision/no confusion/no fever/no loss of consciousness/no change in level of consciousness

## 2021-09-18 NOTE — ED PROVIDER NOTE - CLINICAL SUMMARY MEDICAL DECISION MAKING FREE TEXT BOX
88 year old female p/w nausea, light-headedness, weakness and generalized malaise x 1 week.  Was told she is hyponatremic 2 days ago.  Labs, UA, urine NA, check EKG, CXR.  Normal saline.  Consider admission

## 2021-09-18 NOTE — H&P ADULT - HISTORY OF PRESENT ILLNESS
89 yo f pmh htn, hypothyroidism, gerd, IBS, previous episode of symptomatic hyponatremia presents with weakness , dizziness and generalized malaise.     In the ED cbc, cmp, cardiac enzymes, UA, covid pcr were obtained. cbc wnl. cmp with Na 128. UA with 11-25 rbcs, moderate blood, trace ketones, sm leuk esterase. Covid negative.   Pt was given 1L NS bolus. 87 yo f pmh htn, hypothyroidism, gerd, IBS, previous episode of symptomatic hyponatremia presents with weakness , dizziness and generalized malaise. She was seen by her PCP this week and told that she had hyponatremia, told to fluid restrict. Howver symptoms abruptly worsened and came to the ED. Patient stated that she started feeling unwell and weak with generalized malaise earlier in the day.     In the ED cbc, cmp, cardiac enzymes, UA, covid pcr were obtained. cbc wnl. cmp with Na 128. UA with 11-25 rbcs, moderate blood, trace ketones, sm leuk esterase. Covid negative.   Pt was given 1L NS bolus.  CXR: Unremarkable  EKG: NSR 80 bpm QT/QTc 414/477 87 yo f pmh htn, hypothyroidism, gerd, IBS, previous episode of symptomatic hyponatremia presents with weakness , dizziness and generalized malaise. She was seen by her PCP this week and told that she had hyponatremia, told to fluid restrict. Howver symptoms abruptly worsened and came to the ED. Patient stated that she started feeling unwell and weak with generalized malaise earlier in the day. Pt does not want her son called for further history at this time as he was here late into the evening and is resting.     In the ED cbc, cmp, cardiac enzymes, UA, covid pcr were obtained. cbc wnl. cmp with Na 128. UA with 11-25 rbcs, moderate blood, trace ketones, sm leuk esterase. Covid negative.   Pt was given 1L NS bolus.  CXR: Unremarkable  EKG: NSR 80 bpm QT/QTc 414/477

## 2021-09-18 NOTE — ED PROVIDER NOTE - MUSCULOSKELETAL [-], MLM
MS: afib w/ occasional and bigeminal PVC's,    no back pain/no joint pain/no neck pain/no calf pain/no limited range of motion

## 2021-09-18 NOTE — DISCHARGE NOTE PROVIDER - NSDCFUSCHEDAPPT_GEN_ALL_CORE_FT
BRANDON KHAN ; 09/20/2021 ; NPP OB/ [facility]  BRANDON KHAN ; 10/01/2021 ; NPP OB[facility] [id number]

## 2021-09-18 NOTE — PHYSICAL THERAPY INITIAL EVALUATION ADULT - PERTINENT HX OF CURRENT PROBLEM, REHAB EVAL
89 yo f pmh htn, hypothyroidism, gerd, IBS, previous episode of symptomatic hyponatremia presents with weakness , dizziness and generalized malaise. She was seen by her PCP this week and told that she had hyponatremia, told to fluid restrict. Howver symptoms abruptly worsened and came to the ED.

## 2021-09-18 NOTE — H&P ADULT - NSHPPHYSICALEXAM_GEN_ALL_CORE
Vital Signs Last 24 Hrs  T(C): 36.4 (18 Sep 2021 01:01), Max: 36.4 (18 Sep 2021 01:01)  T(F): 97.6 (18 Sep 2021 01:01), Max: 97.6 (18 Sep 2021 01:01)  HR: 85 (18 Sep 2021 01:01) (85 - 85)  BP: 126/77 (18 Sep 2021 01:01) (126/77 - 126/77)  BP(mean): --  RR: 20 (18 Sep 2021 01:01) (20 - 20)  SpO2: 96% (18 Sep 2021 01:01) (96% - 96%)    General: Well developed, well nourished, NAD  HEENT: NCAT, PERRLA, EOMI bl, dry mucous membranes   Neck: Supple, nontender, no mass  Neurology: A&Ox3, nonfocal, CN II-XII grossly intact  Respiratory: CTA B/L, No W/R/R  CV: RRR, +S1/S2, no murmurs, rubs or gallops  Abdominal: Soft, NT, ND +BSx4  Extremities: No C/C/E, + peripheral pulses  MSK: Normal ROM, no joint erythema or warmth, no joint swelling   Skin: warm, dry, normal color Vital Signs Last 24 Hrs  T(C): 36.4 (18 Sep 2021 01:01), Max: 36.4 (18 Sep 2021 01:01)  T(F): 97.6 (18 Sep 2021 01:01), Max: 97.6 (18 Sep 2021 01:01)  HR: 85 (18 Sep 2021 01:01) (85 - 85)  BP: 126/77 (18 Sep 2021 01:01) (126/77 - 126/77)  BP(mean): --  RR: 20 (18 Sep 2021 01:01) (20 - 20)  SpO2: 96% (18 Sep 2021 01:01) (96% - 96%)    General: Tearful and irritable at bedside  HEENT: NCAT, PERRLA, EOMI bl, dry mucous membranes   Neck: Supple, nontender, no mass  Neurology: A&Ox3, nonfocal  Respiratory: CTA B/L, No W/R/R  CV: RRR, +S1/S2, no murmurs, rubs or gallops  Abdominal: Soft, NT, ND +BSx4  Extremities: No C/C/E, + peripheral pulses  MSK: Normal ROM, no joint erythema or warmth, no joint swelling   Skin: warm, dry, normal color

## 2021-09-18 NOTE — PHYSICAL THERAPY INITIAL EVALUATION ADULT - RANGE OF MOTION EXAMINATION, REHAB EVAL
thoracic kyphosis/bilateral upper extremity ROM was WFL (within functional limits)/bilateral lower extremity ROM was WFL (within functional limits)/deficits as listed below

## 2021-09-18 NOTE — DISCHARGE NOTE PROVIDER - NSDCCPCAREPLAN_GEN_ALL_CORE_FT
PRINCIPAL DISCHARGE DIAGNOSIS  Diagnosis: Hyponatremia  Assessment and Plan of Treatment: You came to the hospital because you were feeling weak which was likely because you salt levels were low. You were probably no eating enough which caused this to happen. Your levels improved quickly and you felt better.   Follow up with your primary care doctor within 1 week of discharge.      SECONDARY DISCHARGE DIAGNOSES  Diagnosis: Weakness with dizziness  Assessment and Plan of Treatment: You came to the hospital because you were feeling weak which was likely because you salt levels were low. You were probably no eating enough which caused this to happen. Your levels improved quickly and you felt better.   Follow up with your primary care doctor within 1 week of discharge.    Diagnosis: Hypothyroid  Assessment and Plan of Treatment: You were previously diagnosed with hypothyroidism. Your TSH was above normal, 4.34. Please continue to take your synthroid as prescribed and follow up with your primary care doctor and/or endocrinologist for further monitoring of your thyroid levels.     PRINCIPAL DISCHARGE DIAGNOSIS  Diagnosis: Hyponatremia  Assessment and Plan of Treatment: You came to the hospital because you were feeling weak which was likely because you salt levels were low. You were probably no eating enough which caused this to happen. Your levels improved and you felt better.   Follow up with your primary care doctor within 1 week of discharge.      SECONDARY DISCHARGE DIAGNOSES  Diagnosis: Weakness with dizziness  Assessment and Plan of Treatment: You came to the hospital because you were feeling weak which was likely because you salt levels were low. You were probably no eating enough which caused this to happen. Your levels improved quickly and you felt better.   Follow up with your primary care doctor within 1 week of discharge.    Diagnosis: Hypothyroid  Assessment and Plan of Treatment: You were previously diagnosed with hypothyroidism. Your TSH was above normal, 4.34. Please continue to take your synthroid as prescribed and follow up with your primary care doctor and/or endocrinologist for further monitoring of your thyroid levels.     PRINCIPAL DISCHARGE DIAGNOSIS  Diagnosis: Hyponatremia  Assessment and Plan of Treatment: You came to the hospital because you were feeling weak. You were found to have low sodium levels which likely caused your presenting symptoms. You were probably not eating enough which caused this to happen. Your sodium levels and symptoms improved.  Follow up with your primary care doctor within 1 week of discharge for repeat bloodwork.      SECONDARY DISCHARGE DIAGNOSES  Diagnosis: Weakness with dizziness  Assessment and Plan of Treatment: You came to the hospital because you were feeling weak which was likely because your salt levels were low. You were probably not eating enough which caused this to happen. Your levels improved quickly and you felt better.   Follow up with your primary care doctor within 1 week of discharge for repeat bloodwork.    Diagnosis: Hypothyroid  Assessment and Plan of Treatment: You were previously diagnosed with hypothyroidism. Your TSH was above normal, 4.34. We increased your syntrhoid to 37.5 mcg daily. Please continue to take your synthroid as prescribed and follow up with your primary care doctor and/or endocrinologist within 1 week for further monitoring of your thyroid levels.    Diagnosis: HTN (hypertension)  Assessment and Plan of Treatment: You were peviously diagnosed with hypertension. Please continue to take your amlodipine as prescribed.     PRINCIPAL DISCHARGE DIAGNOSIS  Diagnosis: Hyponatremia  Assessment and Plan of Treatment: You came to the hospital because you were feeling weak. You were found to have low sodium levels which likely caused your presenting symptoms. You were probably not eating enough which caused this to happen. Your sodium levels and symptoms improved.  Follow up with nephrology - Dr. Bernal and your primary care doctor within 1 week of discharge for repeat bloodwork and further management.      SECONDARY DISCHARGE DIAGNOSES  Diagnosis: Weakness with dizziness  Assessment and Plan of Treatment: You came to the hospital because you were feeling weak which was likely because your salt levels were low. You were probably not eating enough which caused this to happen. Your levels improved quickly and you felt better.   Follow up with nephrology - Dr. Bernal and your primary care doctor within 1 week of discharge for repeat bloodwork  and further management.    Diagnosis: Hypothyroid  Assessment and Plan of Treatment: You were previously diagnosed with hypothyroidism. Your TSH was above normal, 4.34. We increased your syntrhoid to 37.5 mcg daily. Please continue to take your synthroid as prescribed and follow up with your primary care doctor and/or endocrinologist within 1 week for further monitoring of your thyroid levels.    Diagnosis: HTN (hypertension)  Assessment and Plan of Treatment: You were peviously diagnosed with hypertension. Your blood pressure was elevated during your hospitalization and you were started on Toprol XL 25 mg daily. Please continue to take your amlodipine and Toprol XL as prescribed.   Please follow up with your PCP within 1 week for further monitoring.     PRINCIPAL DISCHARGE DIAGNOSIS  Diagnosis: Hyponatremia  Assessment and Plan of Treatment: You came to the hospital because you were feeling weak. You were found to have low sodium levels which likely caused your presenting symptoms. You were probably not eating enough which caused this to happen. Your sodium levels and symptoms improved. Follow up with nephrology - Dr. Bernal and your primary care doctor within 1 week of discharge for repeat bloodwork and further management.      SECONDARY DISCHARGE DIAGNOSES  Diagnosis: Weakness with dizziness  Assessment and Plan of Treatment: You came to the hospital because you were feeling weak which was likely because your salt levels were low. You were probably not eating enough which caused this to happen. Your levels improved quickly and you felt better. Follow up with nephrology - Dr. Bernal and your primary care doctor within 1 week of discharge for repeat bloodwork  and further management.    Diagnosis: Hypothyroid  Assessment and Plan of Treatment: You were previously diagnosed with hypothyroidism. Your TSH was above normal, 4.34. We increased your syntrhoid to 37.5 mcg daily. Please continue to take your synthroid as prescribed and follow up with your primary care doctor and/or endocrinologist within 1 week for further monitoring of your thyroid levels.    Diagnosis: HTN (hypertension)  Assessment and Plan of Treatment: You were peviously diagnosed with hypertension. Your blood pressure was elevated during your hospitalization and you were started on Toprol XL 25 mg daily. Please continue to take your amlodipine and Toprol XL as prescribed. Please follow up with your PCP within 1 week for further monitoring.     PRINCIPAL DISCHARGE DIAGNOSIS  Diagnosis: Hyponatremia  Assessment and Plan of Treatment: You came to the hospital because you were feeling weak. You were found to have low sodium levels which likely caused your presenting symptoms. You were probably not eating enough which caused this to happen. Your sodium levels and symptoms improved. Please avoid excess water intake, limit fluid intake to 1500mL in a day.  Follow up with nephrology - Dr. Bernal and your primary care doctor within 1 week of discharge for repeat bloodwork and further management.      SECONDARY DISCHARGE DIAGNOSES  Diagnosis: Weakness with dizziness  Assessment and Plan of Treatment: You came to the hospital because you were feeling weak which was likely because your salt levels were low. You were probably not eating enough which caused this to happen. Your levels improved quickly and you felt better. Follow up with nephrology - Dr. Bernal and your primary care doctor within 1 week of discharge for repeat bloodwork  and further management.    Diagnosis: Hypothyroid  Assessment and Plan of Treatment: You were previously diagnosed with hypothyroidism. Your TSH was above normal, 4.34. We increased your syntrhoid to 37.5 mcg daily. Please continue to take your synthroid as prescribed and follow up with your primary care doctor and/or endocrinologist within 4-6 weeks for further monitoring of your thyroid levels.    Diagnosis: HTN (hypertension)  Assessment and Plan of Treatment: You were peviously diagnosed with hypertension. Your blood pressure was elevated during your hospitalization and you were started on Toprol XL 25 mg daily. Please continue to take your amlodipine and Toprol XL as prescribed. Please follow up with your PCP within 1 week for further monitoring.

## 2021-09-18 NOTE — ED PROVIDER NOTE - OBJECTIVE STATEMENT
88 year old female with a history of HTN, hypothyroid, GERD presents with weakness, dizziness and generalized malaise.  Patient lives at home alone, son at bedside lives 10 minutes away.  Patient states since last week, she "just doesn't feel good."  Her son took her to her PMD and she had blood work done.  PMD called son 2 days ago to report that patient was hyponatremic and will need to fluid-restrict her diet.  However, since yesterday, she reports worsening dizziness/light-headedness, nausea, and malaise.  She took Advil without relief.  PMD Dr. Hutton 88 year old female with a history of HTN, hypothyroid, GERD presents with weakness, dizziness and generalized malaise.  Patient lives at home alone, son at bedside lives 10 minutes away.  Patient states since last week, she "just doesn't feel good."  Her son took her to her PMD and she had blood work done.  PMD called son 2 days ago to report that patient was hyponatremic and will need to fluid-restrict her diet.  However, since yesterday, she reports worsening dizziness/light-headedness, nausea, and malaise.  She took Advil without relief.  Son states patient has had symptomatic hyponatremia in the past.  PMD Dr. Hutton

## 2021-09-18 NOTE — CONSULT NOTE ADULT - ASSESSMENT
Hyponatremia  Hypertension  Hypothyroidism    Sodium levels improved but rather rapidly. IVF NS stopped. Started on D5W. Dose DDAVP. Check blader scan to r/o urinary retention.   Monitor BP trend. Titrate BP meds as needed. Adjust levothyroxine dose. Will follow electrolytes and renal function trend.   Further recommendations pending clinical course. Thank you for the courtesy of this referral.

## 2021-09-18 NOTE — H&P ADULT - NSHPSOCIALHISTORY_GEN_ALL_CORE
patient lives alone at home, ambulated and performs ADLs independently. No EtOH or tobacco use. Has been covid vaccinated Pfizer patient lives alone at home, ambulated and performs ADLs independently. No EtOH or tobacco use. Has been covid vaccinated Pfizer 2/2 August 2021

## 2021-09-18 NOTE — ED PROVIDER NOTE - NSICDXPASTMEDICALHX_GEN_ALL_CORE_FT
PAST MEDICAL HISTORY:  Gastroesophageal reflux disease without esophagitis     Hiatal hernia     HTN (hypertension)     Hypothyroid     IBS (irritable bowel syndrome)

## 2021-09-18 NOTE — ED ADULT NURSE NOTE - OBJECTIVE STATEMENT
Patient A/Ox4. Son at bedside. C/o "just not feeling right". Says she feels dizzy and anxious and does not know why. Son endorses results from PCP on Wednesday stating that her sodium was low. EKG done and patient placed on cardiac monitor. Call light in reach.

## 2021-09-18 NOTE — ED PROVIDER NOTE - NSICDXPASTSURGICALHX_GEN_ALL_CORE_FT
PAST SURGICAL HISTORY:  H/O hernia repair 12/22/2015    S/P colon resection removal mof being colon polyp    S/P inguinal hernia repair left 5 years ago

## 2021-09-18 NOTE — PROGRESS NOTE ADULT - ATTENDING COMMENTS
87 y/o F pmh htn, hypothyroidism, gerd, IBS, previous episode of symptomatic hyponatremia presents with weakness, dizziness and generalized malaise. Patient states she feels better today, but not back to her baseline. Na improving too rapidly this AM, discussed with Nephro, will give DDAVP and repeat labs in afternoon. Discussed plan of care with son at bedside.

## 2021-09-19 ENCOUNTER — TRANSCRIPTION ENCOUNTER (OUTPATIENT)
Age: 86
End: 2021-09-19

## 2021-09-19 VITALS
HEART RATE: 76 BPM | SYSTOLIC BLOOD PRESSURE: 137 MMHG | DIASTOLIC BLOOD PRESSURE: 86 MMHG | TEMPERATURE: 99 F | RESPIRATION RATE: 18 BRPM | OXYGEN SATURATION: 97 %

## 2021-09-19 LAB
ALBUMIN SERPL ELPH-MCNC: 3.4 G/DL — SIGNIFICANT CHANGE UP (ref 3.3–5)
ALP SERPL-CCNC: 37 U/L — LOW (ref 40–120)
ALT FLD-CCNC: 19 U/L — SIGNIFICANT CHANGE UP (ref 12–78)
ANION GAP SERPL CALC-SCNC: 5 MMOL/L — SIGNIFICANT CHANGE UP (ref 5–17)
AST SERPL-CCNC: 20 U/L — SIGNIFICANT CHANGE UP (ref 15–37)
BASOPHILS # BLD AUTO: 0.03 K/UL — SIGNIFICANT CHANGE UP (ref 0–0.2)
BASOPHILS NFR BLD AUTO: 0.6 % — SIGNIFICANT CHANGE UP (ref 0–2)
BILIRUB SERPL-MCNC: 0.7 MG/DL — SIGNIFICANT CHANGE UP (ref 0.2–1.2)
BUN SERPL-MCNC: 15 MG/DL — SIGNIFICANT CHANGE UP (ref 7–23)
CALCIUM SERPL-MCNC: 7.7 MG/DL — LOW (ref 8.5–10.1)
CHLORIDE SERPL-SCNC: 99 MMOL/L — SIGNIFICANT CHANGE UP (ref 96–108)
CO2 SERPL-SCNC: 29 MMOL/L — SIGNIFICANT CHANGE UP (ref 22–31)
COVID-19 SPIKE DOMAIN AB INTERP: POSITIVE
COVID-19 SPIKE DOMAIN ANTIBODY RESULT: >250 U/ML — HIGH
CREAT SERPL-MCNC: 0.75 MG/DL — SIGNIFICANT CHANGE UP (ref 0.5–1.3)
EOSINOPHIL # BLD AUTO: 0.11 K/UL — SIGNIFICANT CHANGE UP (ref 0–0.5)
EOSINOPHIL NFR BLD AUTO: 2.2 % — SIGNIFICANT CHANGE UP (ref 0–6)
GLUCOSE SERPL-MCNC: 84 MG/DL — SIGNIFICANT CHANGE UP (ref 70–99)
HCT VFR BLD CALC: 37 % — SIGNIFICANT CHANGE UP (ref 34.5–45)
HGB BLD-MCNC: 12.3 G/DL — SIGNIFICANT CHANGE UP (ref 11.5–15.5)
IMM GRANULOCYTES NFR BLD AUTO: 0.4 % — SIGNIFICANT CHANGE UP (ref 0–1.5)
LYMPHOCYTES # BLD AUTO: 1.74 K/UL — SIGNIFICANT CHANGE UP (ref 1–3.3)
LYMPHOCYTES # BLD AUTO: 34.6 % — SIGNIFICANT CHANGE UP (ref 13–44)
MCHC RBC-ENTMCNC: 30.1 PG — SIGNIFICANT CHANGE UP (ref 27–34)
MCHC RBC-ENTMCNC: 33.2 GM/DL — SIGNIFICANT CHANGE UP (ref 32–36)
MCV RBC AUTO: 90.5 FL — SIGNIFICANT CHANGE UP (ref 80–100)
MONOCYTES # BLD AUTO: 0.55 K/UL — SIGNIFICANT CHANGE UP (ref 0–0.9)
MONOCYTES NFR BLD AUTO: 10.9 % — SIGNIFICANT CHANGE UP (ref 2–14)
NEUTROPHILS # BLD AUTO: 2.58 K/UL — SIGNIFICANT CHANGE UP (ref 1.8–7.4)
NEUTROPHILS NFR BLD AUTO: 51.3 % — SIGNIFICANT CHANGE UP (ref 43–77)
NRBC # BLD: 0 /100 WBCS — SIGNIFICANT CHANGE UP (ref 0–0)
PLATELET # BLD AUTO: 217 K/UL — SIGNIFICANT CHANGE UP (ref 150–400)
POTASSIUM SERPL-MCNC: 3.5 MMOL/L — SIGNIFICANT CHANGE UP (ref 3.5–5.3)
POTASSIUM SERPL-SCNC: 3.5 MMOL/L — SIGNIFICANT CHANGE UP (ref 3.5–5.3)
PROT SERPL-MCNC: 6.4 G/DL — SIGNIFICANT CHANGE UP (ref 6–8.3)
RBC # BLD: 4.09 M/UL — SIGNIFICANT CHANGE UP (ref 3.8–5.2)
RBC # FLD: 13.2 % — SIGNIFICANT CHANGE UP (ref 10.3–14.5)
SARS-COV-2 IGG+IGM SERPL QL IA: >250 U/ML — HIGH
SARS-COV-2 IGG+IGM SERPL QL IA: POSITIVE
SODIUM SERPL-SCNC: 133 MMOL/L — LOW (ref 135–145)
WBC # BLD: 5.03 K/UL — SIGNIFICANT CHANGE UP (ref 3.8–10.5)
WBC # FLD AUTO: 5.03 K/UL — SIGNIFICANT CHANGE UP (ref 3.8–10.5)

## 2021-09-19 PROCEDURE — 97163 PT EVAL HIGH COMPLEX 45 MIN: CPT

## 2021-09-19 PROCEDURE — 99285 EMERGENCY DEPT VISIT HI MDM: CPT | Mod: 25

## 2021-09-19 PROCEDURE — 82550 ASSAY OF CK (CPK): CPT

## 2021-09-19 PROCEDURE — 0225U NFCT DS DNA&RNA 21 SARSCOV2: CPT

## 2021-09-19 PROCEDURE — 85025 COMPLETE CBC W/AUTO DIFF WBC: CPT

## 2021-09-19 PROCEDURE — U0005: CPT

## 2021-09-19 PROCEDURE — 84484 ASSAY OF TROPONIN QUANT: CPT

## 2021-09-19 PROCEDURE — 83735 ASSAY OF MAGNESIUM: CPT

## 2021-09-19 PROCEDURE — 85027 COMPLETE CBC AUTOMATED: CPT

## 2021-09-19 PROCEDURE — 84436 ASSAY OF TOTAL THYROXINE: CPT

## 2021-09-19 PROCEDURE — 36415 COLL VENOUS BLD VENIPUNCTURE: CPT

## 2021-09-19 PROCEDURE — 84100 ASSAY OF PHOSPHORUS: CPT

## 2021-09-19 PROCEDURE — 86769 SARS-COV-2 COVID-19 ANTIBODY: CPT

## 2021-09-19 PROCEDURE — 84443 ASSAY THYROID STIM HORMONE: CPT

## 2021-09-19 PROCEDURE — 93005 ELECTROCARDIOGRAM TRACING: CPT

## 2021-09-19 PROCEDURE — 81001 URINALYSIS AUTO W/SCOPE: CPT

## 2021-09-19 PROCEDURE — U0003: CPT

## 2021-09-19 PROCEDURE — 84300 ASSAY OF URINE SODIUM: CPT

## 2021-09-19 PROCEDURE — 99239 HOSP IP/OBS DSCHRG MGMT >30: CPT

## 2021-09-19 PROCEDURE — 84480 ASSAY TRIIODOTHYRONINE (T3): CPT

## 2021-09-19 PROCEDURE — 82553 CREATINE MB FRACTION: CPT

## 2021-09-19 PROCEDURE — 71045 X-RAY EXAM CHEST 1 VIEW: CPT

## 2021-09-19 PROCEDURE — 80048 BASIC METABOLIC PNL TOTAL CA: CPT

## 2021-09-19 PROCEDURE — 80053 COMPREHEN METABOLIC PANEL: CPT

## 2021-09-19 PROCEDURE — 83930 ASSAY OF BLOOD OSMOLALITY: CPT

## 2021-09-19 PROCEDURE — 96360 HYDRATION IV INFUSION INIT: CPT

## 2021-09-19 RX ORDER — METOPROLOL TARTRATE 50 MG
1 TABLET ORAL
Qty: 30 | Refills: 0
Start: 2021-09-19

## 2021-09-19 RX ORDER — LEVOTHYROXINE SODIUM 125 MCG
0.5 TABLET ORAL
Qty: 15 | Refills: 0
Start: 2021-09-19

## 2021-09-19 RX ORDER — POTASSIUM CHLORIDE 20 MEQ
40 PACKET (EA) ORAL ONCE
Refills: 0 | Status: COMPLETED | OUTPATIENT
Start: 2021-09-19 | End: 2021-09-19

## 2021-09-19 RX ORDER — LEVOTHYROXINE SODIUM 125 MCG
1.5 TABLET ORAL
Qty: 45 | Refills: 0
Start: 2021-09-19 | End: 2021-10-18

## 2021-09-19 RX ADMIN — AMLODIPINE BESYLATE 5 MILLIGRAM(S): 2.5 TABLET ORAL at 07:56

## 2021-09-19 RX ADMIN — Medication 25 MICROGRAM(S): at 05:52

## 2021-09-19 NOTE — PROGRESS NOTE ADULT - ASSESSMENT
87 yo f pmh htn, hypothyroidism, gerd, IBS, previous episode of symptomatic hyponatremia presents with weakness, dizziness and generalized malaise.   Admit for symptomatic hyponatremia. Na initially corrected too quickly, pt now s/p DDAVP and IVF D5 with stable Na levels. Plan for discharge today.     #Hyponatremia  - pt with previous episode of symptomatic hyponatremia.   - pt previously fluid restricting at home since diagnosed with hyponatremia on wednesday  - Hyponatremia resolved however quickly. S/p DDAVP 9/18/21 per nephro   - Na 133 this AM, s/p IVF hydration D5 @ 75cc/hr    - Na stable, will hold additional IVF for now   - f/u urine lytes/osmolality   - nephrology consulted Dr. Bernal, recs appreciated     #Dizziness/malaise   - suspect patients presenting symptoms are secondary to symptomatic hyponatremia however will evaluate for alternative etiology   - no focal deficits on exam. doubt cva   - medications reviewed   - Orthostatic vital signs negative   - TSH elevated 4.34, T3 low 68 and t4 wnl. Not likely contributing to symptoms.   - mg, phos wnl   - replete K prn   - cardiac enzymes negative. EKG: NSR 80 bpm  - afebrile, no leukoctyosis. covid pcr neg. rvp neg    - oob with assist, fall precautions     #Hypothyroidism  - TSH elevated 4.34, T3 low 68 and t4 wnl. Not likely contributing to symptoms.   - increase to synthroid 37.5mcg daily     #HTN  - BP in hypertensive range   - continue home amlodipine w/ hold parameters in place  - continue toprol xl w/ hold parameters in place   - monitor routine hemodynamics      #Prophylactic Measure   - lovenox 40 QD 87 yo f pmh htn, hypothyroidism, gerd, IBS, previous episode of symptomatic hyponatremia presents with weakness, dizziness and generalized malaise. Admitted for symptomatic hyponatremia. Na initially corrected too quickly, pt now s/p DDAVP and IVF D5 with stable Na levels. Plan for discharge today.     #Hyponatremia  - pt with previous episode of symptomatic hyponatremia.   - pt previously fluid restricting at home since diagnosed with hyponatremia on wednesday  - Hyponatremia resolved however quickly. S/p DDAVP 9/18/21 per nephro   - Na 133 this AM, s/p IVF hydration D5 @ 75cc/hr    - Na stable, will hold additional IVF for now   - f/u urine lytes/osmolality   - nephrology consulted Dr. Bernal, recs appreciated     #Dizziness/malaise   - suspect patients presenting symptoms are secondary to symptomatic hyponatremia however will evaluate for alternative etiology   - no focal deficits on exam. doubt cva   - medications reviewed   - Orthostatic vital signs negative   - TSH elevated 4.34, T3 low 68 and t4 wnl. Not likely contributing to symptoms.   - mg, phos wnl   - replete K prn   - cardiac enzymes negative. EKG: NSR 80 bpm  - afebrile, no leukoctyosis. covid pcr neg. rvp neg    - oob with assist, fall precautions     #Hypothyroidism  - TSH elevated 4.34, T3 low 68 and t4 wnl. Not likely contributing to symptoms.   - increase to synthroid 37.5mcg daily     #HTN  - BP in hypertensive range   - continue home amlodipine w/ hold parameters in place  - continue toprol xl w/ hold parameters in place   - monitor routine hemodynamics      #Prophylactic Measure   - lovenox 40 QD 87 yo f pmh htn, hypothyroidism, gerd, IBS, previous episode of symptomatic hyponatremia presents with weakness, dizziness and generalized malaise. Admitted for symptomatic hyponatremia. Na initially corrected too quickly, pt now s/p DDAVP and IVF D5 with stable Na levels. Plan for discharge today.     #Hyponatremia  - pt with previous episode of symptomatic hyponatremia.   - pt previously fluid restricting at home since diagnosed with hyponatremia on wednesday  - Hyponatremia resolved however quickly. S/p DDAVP 9/18/21 per nephro   - Na 133 this AM, s/p IVF hydration D5 @ 75cc/hr    - Na stable, will hold additional IVF for now   - f/u urine lytes/osmolality   - nephrology consulted Dr. Bernal, recs appreciated, stable for d/c planning    #Dizziness/malaise   - suspect patients presenting symptoms are secondary to symptomatic hyponatremia however will evaluate for alternative etiology   - no focal deficits on exam. doubt cva   - medications reviewed   - Orthostatic vital signs negative   - mg, phos wnl   - replete K prn   - cardiac enzymes negative. EKG: NSR 80 bpm  - afebrile, no leukoctyosis. covid pcr neg. rvp neg    - oob with assist, fall precautions     #Hypothyroidism  - TSH elevated 4.34, T3 low 68 and t4 wnl. Not likely contributing to symptoms.   - increase to synthroid 37.5mcg daily     #HTN  - BP in hypertensive range   - continue home amlodipine w/ hold parameters in place  - continue toprol xl w/ hold parameters in place   - monitor routine hemodynamics      #Prophylactic Measure   - lovenox 40 QD

## 2021-09-19 NOTE — PROGRESS NOTE ADULT - SUBJECTIVE AND OBJECTIVE BOX
Patient is a 88y old  Female who presents with a chief complaint of hyponatremia (18 Sep 2021 08:23)      INTERVAL HPI/OVERNIGHT EVENTS: Patient has no complaints at this time, resting comfortably in bed, feeling well. Denies fevers, chills, headache, lightheadedness, chest pain, dyspnea, abdominal pain, nausea, vomiting, diarrhea, constipation.  No overnight events occurred.    MEDICATIONS  (STANDING):  amLODIPine   Tablet 5 milliGRAM(s) Oral daily  desmopressin Injectable 2 MICROGram(s) SubCutaneous once  dextrose 5%. 1000 milliLiter(s) (75 mL/Hr) IV Continuous <Continuous>  enoxaparin Injectable 40 milliGRAM(s) SubCutaneous daily  levothyroxine 25 MICROGram(s) Oral daily  metoprolol succinate ER 25 milliGRAM(s) Oral daily    MEDICATIONS  (PRN):  melatonin 3 milliGRAM(s) Oral at bedtime PRN Insomnia      Allergies    No Known Allergies    Intolerances        REVIEW OF SYSTEMS:  CONSTITUTIONAL: No fever or chills  HEENT:  No headache, no sore throat  RESPIRATORY: No cough, wheezing, or shortness of breath  CARDIOVASCULAR: No chest pain, palpitations  GASTROINTESTINAL: No abd pain, nausea, vomiting, or diarrhea  GENITOURINARY: No dysuria, frequency, or hematuria  NEUROLOGICAL: no focal weakness or dizziness  MUSCULOSKELETAL: no myalgias     Vital Signs Last 24 Hrs  T(C): 36.9 (18 Sep 2021 06:59), Max: 37.2 (18 Sep 2021 05:44)  T(F): 98.5 (18 Sep 2021 06:59), Max: 98.9 (18 Sep 2021 05:44)  HR: 82 (18 Sep 2021 06:59) (79 - 85)  BP: 166/79 (18 Sep 2021 06:59) (126/77 - 166/85)  BP(mean): --  RR: 18 (18 Sep 2021 06:59) (18 - 20)  SpO2: 92% (18 Sep 2021 06:59) (92% - 96%)    PHYSICAL EXAM:  GENERAL: not in acute distress at rest   HEENT:  anicteric, moist mucous membranes  CHEST/LUNG:  CTA b/l, no rales, wheezes, or rhonchi  HEART:  RRR, S1, S2  ABDOMEN:  BS+, soft, nontender, nondistended  EXTREMITIES: no edema, cyanosis, or calf tenderness  NERVOUS SYSTEM: answers questions and follows commands appropriately    LABS:                        12.4   4.82  )-----------( 203      ( 18 Sep 2021 06:51 )             35.1     CBC Full  -  ( 18 Sep 2021 06:51 )  WBC Count : 4.82 K/uL  Hemoglobin : 12.4 g/dL  Hematocrit : 35.1 %  Platelet Count - Automated : 203 K/uL  Mean Cell Volume : 88.9 fl  Mean Cell Hemoglobin : 31.4 pg  Mean Cell Hemoglobin Concentration : 35.3 gm/dL  Auto Neutrophil # : x  Auto Lymphocyte # : x  Auto Monocyte # : x  Auto Eosinophil # : x  Auto Basophil # : x  Auto Neutrophil % : x  Auto Lymphocyte % : x  Auto Monocyte % : x  Auto Eosinophil % : x  Auto Basophil % : x    18 Sep 2021 12:04    134    |  102    |  9      ----------------------------<  94     3.6     |  27     |  0.57     Ca    8.2        18 Sep 2021 12:04  Phos  2.6       18 Sep 2021 06:51  Mg     2.1       18 Sep 2021 06:51    TPro  6.7    /  Alb  3.6    /  TBili  0.8    /  DBili  x      /  AST  18     /  ALT  19     /  AlkPhos  37     18 Sep 2021 06:51      Urinalysis Basic - ( 18 Sep 2021 01:54 )    Color: Yellow / Appearance: Clear / S.005 / pH: x  Gluc: x / Ketone: Trace  / Bili: Negative / Urobili: Negative   Blood: x / Protein: Negative / Nitrite: Negative   Leuk Esterase: Small / RBC: 11-25 /HPF / WBC 3-5   Sq Epi: x / Non Sq Epi: Few / Bacteria: Occasional      CAPILLARY BLOOD GLUCOSE              RADIOLOGY & ADDITIONAL TESTS:    Personally reviewed.     Consultant(s) Notes Reviewed:  [x] YES  [ ] NO    
Patient is a 88y old  Female who presents with a chief complaint of hyponatremia (18 Sep 2021 13:10)  Patient seen in follow up for hyponatremia.        PAST MEDICAL HISTORY:  Hypothyroid    HTN (hypertension)    Hiatal hernia    Gastroesophageal reflux disease without esophagitis    IBS (irritable bowel syndrome)      MEDICATIONS  (STANDING):  amLODIPine   Tablet 5 milliGRAM(s) Oral daily  dextrose 5%. 1000 milliLiter(s) (75 mL/Hr) IV Continuous <Continuous>  enoxaparin Injectable 40 milliGRAM(s) SubCutaneous daily  influenza   Vaccine 0.5 milliLiter(s) IntraMuscular once  levothyroxine 25 MICROGram(s) Oral daily  metoprolol succinate ER 25 milliGRAM(s) Oral daily    MEDICATIONS  (PRN):  melatonin 3 milliGRAM(s) Oral at bedtime PRN Insomnia    T(C): 36.7 (21 @ 04:47), Max: 37.2 (21 @ 05:44)  HR: 88 (21 @ 08:00) (57 - 88)  BP: 162/89 (21 @ 08:00) (126/69 - 166/85)  RR: 18 (21 @ 04:47) (18 - 20)  SpO2: 93% (21 @ 04:47) (92% - 97%)  Wt(kg): --  I&O's Detail      PHYSICAL EXAM:  General: No distress  Respiratory: b/l air entry  Cardiovascular: S1 S2  Gastrointestinal: soft  Extremities:  no edema                              12.3   5.03  )-----------( 217      ( 19 Sep 2021 07:19 )             37.0         133<L>  |  99  |  15  ----------------------------<  84  3.5   |  29  |  0.75    Ca    7.7<L>      19 Sep 2021 07:19  Phos  2.6       Mg     2.1         TPro  6.4  /  Alb  3.4  /  TBili  0.7  /  DBili  x   /  AST  20  /  ALT  19  /  AlkPhos  37<L>      CARDIAC MARKERS ( 18 Sep 2021 02:05 )  <.015 ng/mL / x     / 146 U/L / x     / 5.0 ng/mL      LIVER FUNCTIONS - ( 19 Sep 2021 07:19 )  Alb: 3.4 g/dL / Pro: 6.4 g/dL / ALK PHOS: 37 U/L / ALT: 19 U/L / AST: 20 U/L / GGT: x           Urinalysis Basic - ( 18 Sep 2021 01:54 )    Color: Yellow / Appearance: Clear / S.005 / pH: x  Gluc: x / Ketone: Trace  / Bili: Negative / Urobili: Negative   Blood: x / Protein: Negative / Nitrite: Negative   Leuk Esterase: Small / RBC: 11-25 /HPF / WBC 3-5   Sq Epi: x / Non Sq Epi: Few / Bacteria: Occasional        Sodium, Serum: 133 ( @ 07:19)  Sodium, Serum: 134 ( @ 12:04)  Sodium, Serum: 136 ( @ 06:51)  Sodium, Serum: 128 ( @ 02:05)    Creatinine, Serum: 0.75 ( 07:19)  Creatinine, Serum: 0.57 ( @ 12:04)  Creatinine, Serum: 0.52 ( @ 06:51)  Creatinine, Serum: 0.62 ( @ 02:05)    Potassium, Serum: 3.5 ( @ 07:19)  Potassium, Serum: 3.6 ( @ 12:04)  Potassium, Serum: 3.7 ( @ 06:51)  Potassium, Serum: 3.8 ( @ 02:05)    Hemoglobin: 12.3 ( @ 07:19)  Hemoglobin: 12.4 ( @ 06:51)  Hemoglobin: 12.8 ( @ 02:05)    
Patient is a 88y old  Female who presents with a chief complaint of hyponatremia (19 Sep 2021 11:05)      SUBJECTIVE / OVERNIGHT EVENTS: No acute overnight events. Pt seen and examined at bedside. Pt OOB, endorses some general malaise. Denies any fever, chills, cp, SOB, abdominal pain, n/v/d/c.     ADDITIONAL REVIEW OF SYSTEMS: 10 point ROS negative except per HPI    MEDICATIONS  (STANDING):  amLODIPine   Tablet 5 milliGRAM(s) Oral daily  enoxaparin Injectable 40 milliGRAM(s) SubCutaneous daily  influenza   Vaccine 0.5 milliLiter(s) IntraMuscular once  levothyroxine 25 MICROGram(s) Oral daily  metoprolol succinate ER 25 milliGRAM(s) Oral daily    MEDICATIONS  (PRN):  melatonin 3 milliGRAM(s) Oral at bedtime PRN Insomnia      CAPILLARY BLOOD GLUCOSE        I&O's Summary      PHYSICAL EXAM:  Vital Signs Last 24 Hrs  T(C): 36.7 (19 Sep 2021 04:47), Max: 37.1 (18 Sep 2021 20:17)  T(F): 98.1 (19 Sep 2021 04:47), Max: 98.8 (18 Sep 2021 20:17)  HR: 88 (19 Sep 2021 08:00) (57 - 88)  BP: 162/89 (19 Sep 2021 08:00) (126/69 - 164/90)  BP(mean): --  RR: 18 (19 Sep 2021 04:47) (18 - 18)  SpO2: 93% (19 Sep 2021 04:47) (93% - 97%)    CONSTITUTIONAL: NAD, well-developed   RESPIRATORY: Normal respiratory effort; lungs are clear to auscultation bilaterally; no wheezes, rales, or rhonchi   CARDIOVASCULAR: Regular rate and rhythm, normal S1 and S2, no murmur/rub/gallop; No lower extremity edema; Peripheral pulses are 2+ bilaterally  ABDOMEN: Soft, Nontender to palpation, normoactive bowel sounds, no rebound/guarding; No hepatosplenomegaly  MUSCULOSKELETAL: no clubbing or cyanosis of digits; no joint swelling or tenderness to palpation  NEURO: CN 2-12 grossly intact, moves all limbs spontaneously  PSYCH: A+O to person, place, and time; affect appropriate    LABS:                        12.3   5.03  )-----------( 217      ( 19 Sep 2021 07:19 )             37.0     09-19    133<L>  |  99  |  15  ----------------------------<  84  3.5   |  29  |  0.75    Ca    7.7<L>      19 Sep 2021 07:19  Phos  2.6     -  Mg     2.1     -    TPro  6.4  /  Alb  3.4  /  TBili  0.7  /  DBili  x   /  AST  20  /  ALT  19  /  AlkPhos  37<L>        CARDIAC MARKERS ( 18 Sep 2021 02:05 )  <.015 ng/mL / x     / 146 U/L / x     / 5.0 ng/mL      Urinalysis Basic - ( 18 Sep 2021 01:54 )    Color: Yellow / Appearance: Clear / S.005 / pH: x  Gluc: x / Ketone: Trace  / Bili: Negative / Urobili: Negative   Blood: x / Protein: Negative / Nitrite: Negative   Leuk Esterase: Small / RBC: 11-25 /HPF / WBC 3-5   Sq Epi: x / Non Sq Epi: Few / Bacteria: Occasional          RADIOLOGY & ADDITIONAL TESTS:  Results Reviewed: Y      COORDINATION OF CARE:  Care Discussed with Consultants/Other Providers [Y/N]: Y

## 2021-09-19 NOTE — DISCHARGE NOTE NURSING/CASE MANAGEMENT/SOCIAL WORK - PATIENT PORTAL LINK FT
You can access the FollowMyHealth Patient Portal offered by [facility] by registering at the following website: [email or web address]. By joining [employer]’s FollowMyHealth portal, you will also be able to view your health information using other applications (apps) compatible with our system.

## 2021-09-19 NOTE — DISCHARGE NOTE NURSING/CASE MANAGEMENT/SOCIAL WORK - NSDCVIVACCINE_GEN_ALL_CORE_FT
Tdap; 22-Dec-2015 08:25; Manish Gordillo (ELSY); [employer]; [id number]; IntraMuscular; Deltoid Left.; 0.5 milliLiter(s); VIS (VIS Published: 09-May-2013, VIS Presented: 22-Dec-2015);

## 2021-09-19 NOTE — PROGRESS NOTE ADULT - ATTENDING COMMENTS
89 yo f pmh htn, hypothyroidism, gerd, IBS, previous episode of symptomatic hyponatremia presents with weakness, dizziness and generalized malaise. Admitted for symptomatic hyponatremia. Na initially corrected too quickly, pt now s/p DDAVP and IVF D5 with stable Na levels. Plan for discharge today. Advised to avoid excessive PO fluid intake, limit to 1500mL daily. Discussed plan of care with patient, son, Nephrology. Please refer to updated D/C note for further details

## 2021-09-19 NOTE — PROGRESS NOTE ADULT - ASSESSMENT
Hyponatremia  Hypertension  Hypothyroidism    Sodium levels improved. Will d/c D5W. Potassium supplementation. Monitor BP trend. Titrate BP meds as needed. Adjust levothyroxine dose.   Will follow electrolytes and renal function trend. D/c planning. D/w patient regarding need for out patient nephrology follow up.

## 2021-09-19 NOTE — DISCHARGE NOTE NURSING/CASE MANAGEMENT/SOCIAL WORK - NSDCPEFALRISK_GEN_ALL_CORE
For information on Fall & injury Prevention, visit [email or web address]

## 2021-09-20 ENCOUNTER — APPOINTMENT (OUTPATIENT)
Dept: GYNECOLOGIC ONCOLOGY | Facility: CLINIC | Age: 86
End: 2021-09-20
Payer: MEDICARE

## 2021-09-20 VITALS
SYSTOLIC BLOOD PRESSURE: 172 MMHG | WEIGHT: 132 LBS | BODY MASS INDEX: 24.29 KG/M2 | OXYGEN SATURATION: 96 % | RESPIRATION RATE: 16 BRPM | HEIGHT: 62 IN | DIASTOLIC BLOOD PRESSURE: 70 MMHG | HEART RATE: 76 BPM

## 2021-09-20 DIAGNOSIS — Z63.4 DISAPPEARANCE AND DEATH OF FAMILY MEMBER: ICD-10-CM

## 2021-09-20 DIAGNOSIS — N94.9 UNSPECIFIED CONDITION ASSOCIATED WITH FEMALE GENITAL ORGANS AND MENSTRUAL CYCLE: ICD-10-CM

## 2021-09-20 PROCEDURE — 76857 US EXAM PELVIC LIMITED: CPT | Mod: 59

## 2021-09-20 PROCEDURE — 76830 TRANSVAGINAL US NON-OB: CPT | Mod: 59

## 2021-09-20 PROCEDURE — 99204 OFFICE O/P NEW MOD 45 MIN: CPT | Mod: 25

## 2021-09-20 SDOH — SOCIAL STABILITY - SOCIAL INSECURITY: DISSAPEARANCE AND DEATH OF FAMILY MEMBER: Z63.4

## 2021-09-20 NOTE — ASSESSMENT
[FreeTextEntry1] : 89yo female with a 3.5cm right adnexal septated cyst which on pelvic US today measures 3.8cm mildly complex. I reviewed her pelvic US findings with patient and her son. I am not overly concerned about this cyst which appears to be a benign postmenopausal stable right ovarian cyst. We discussed ordering a , however, I do not feel it is absolutely necessary at this time given limitations of tumor markers. For her complaint of vaginal and vulvar irritation, I suspect postmenopausal atrophy. Recommendation is to apply Aquaphor ointment BID to introitus and to continue with Premarin cream.  All their questions and concerns were addressed. \par \par In a study published in November 2018 in [facility], Whitney et al. reviewed over 70,000 pelvic ultrasounds in a large unselected population to assess for the risk of ovarian cancer when an ovarian cyst was identified.  In their study, simple cysts were not associated with an increased risk of ovarian cancer.  The authors stated that surveillance of simple cysts likely occurs based on the risk of possibly missing complex features.  However, their data did not support this concern particularly if the cyst was less than 7cm.  They concluded that simple cysts are frequently encountered incidental and normal findings on pelvic imaging and additional evaluation of these findings is not warranted.  I’ve discussed this study with the patient and its strengths and limitations.  We discussed the options of observation vs. no further imaging.\par

## 2021-09-20 NOTE — PHYSICAL EXAM
[Abnormal] : Bladder: Abnormal [Normal] : Recto-Vaginal Exam: Normal [Capable of only limited self care, confined to bed or chair more than 50% of waking hours] : Status 3- Capable of only limited self care, confined to bed or chair more than 50% of waking hours [de-identified] : cystocele  [de-identified] : no palpable mass, no nodularity  [de-identified] : Patient was interviewed and examined with chaperone present. Name of chaperone: Marjorie Rosado

## 2021-09-20 NOTE — HISTORY OF PRESENT ILLNESS
[FreeTextEntry1] : This 87yo ,  x 2,  referred by uro gyn, Dr. Panchito William, for evaluation of a  right adnexal cyst. Pt presents with her son who is the primary historian. Son reports patient has has this cyst monitored for many years. She is under the care of urogyn for a cystocele/rectocele managed with a pessary. A pelvic US on 21 revealed small amount of residual cavity fluid, possible cervical stenosis and a right adnexal septated cyst measuring 3.5cm. Pt denies VB, vag d/c, bowel or bladder issues. Denies pelvic/abdominal pain, bloating or weight loss. She does complain of vaginal and vulvar burning and has been using premarin cream which was recently discontinued by uro-gyn. \par \par Health maintenance:\par \par Pap smear-years ago\par Mammo-years ago \par Colonoscopy-unsure  \par DEXA-unsure\par \par

## 2021-09-20 NOTE — END OF VISIT
[FreeTextEntry3] : This note was written by Marjorie Ruvalcaba acting as a scribe for Dr. Alyssa Harris\par This note accurately reflects the work and decisions made by me.\par

## 2021-09-20 NOTE — CHIEF COMPLAINT
[FreeTextEntry1] : [facility]\par \par [facility] [phone] at [address] [place] [id number]\par \par Ovarian cyst

## 2021-10-01 ENCOUNTER — NON-APPOINTMENT (OUTPATIENT)
Age: 86
End: 2021-10-01

## 2021-10-17 ENCOUNTER — INPATIENT (INPATIENT)
Facility: HOSPITAL | Age: 86
LOS: 1 days | Discharge: HOME HEALTH | End: 2021-10-19
Attending: STUDENT IN AN ORGANIZED HEALTH CARE EDUCATION/TRAINING PROGRAM | Admitting: STUDENT IN AN ORGANIZED HEALTH CARE EDUCATION/TRAINING PROGRAM
Payer: MEDICARE

## 2021-10-17 VITALS
OXYGEN SATURATION: 100 % | RESPIRATION RATE: 16 BRPM | TEMPERATURE: 98 F | HEIGHT: 62 IN | DIASTOLIC BLOOD PRESSURE: 91 MMHG | HEART RATE: 86 BPM | SYSTOLIC BLOOD PRESSURE: 175 MMHG

## 2021-10-17 DIAGNOSIS — Z98.89 OTHER SPECIFIED POSTPROCEDURAL STATES: Chronic | ICD-10-CM

## 2021-10-17 LAB
ALBUMIN SERPL ELPH-MCNC: 4.4 G/DL — SIGNIFICANT CHANGE UP (ref 3.3–5)
ALP SERPL-CCNC: 48 U/L — SIGNIFICANT CHANGE UP (ref 40–120)
ALT FLD-CCNC: 14 U/L — SIGNIFICANT CHANGE UP (ref 4–33)
ANION GAP SERPL CALC-SCNC: 15 MMOL/L — HIGH (ref 7–14)
APPEARANCE UR: CLEAR — SIGNIFICANT CHANGE UP
AST SERPL-CCNC: 22 U/L — SIGNIFICANT CHANGE UP (ref 4–32)
BACTERIA # UR AUTO: NEGATIVE — SIGNIFICANT CHANGE UP
BASOPHILS # BLD AUTO: 0.03 K/UL — SIGNIFICANT CHANGE UP (ref 0–0.2)
BASOPHILS NFR BLD AUTO: 0.4 % — SIGNIFICANT CHANGE UP (ref 0–2)
BILIRUB SERPL-MCNC: 0.5 MG/DL — SIGNIFICANT CHANGE UP (ref 0.2–1.2)
BILIRUB UR-MCNC: NEGATIVE — SIGNIFICANT CHANGE UP
BUN SERPL-MCNC: 15 MG/DL — SIGNIFICANT CHANGE UP (ref 7–23)
CALCIUM SERPL-MCNC: 8.8 MG/DL — SIGNIFICANT CHANGE UP (ref 8.4–10.5)
CHLORIDE SERPL-SCNC: 92 MMOL/L — LOW (ref 98–107)
CO2 SERPL-SCNC: 20 MMOL/L — LOW (ref 22–31)
COD CRY URNS QL: ABNORMAL
COLOR SPEC: SIGNIFICANT CHANGE UP
CREAT SERPL-MCNC: 0.65 MG/DL — SIGNIFICANT CHANGE UP (ref 0.5–1.3)
DIFF PNL FLD: ABNORMAL
EOSINOPHIL # BLD AUTO: 0.03 K/UL — SIGNIFICANT CHANGE UP (ref 0–0.5)
EOSINOPHIL NFR BLD AUTO: 0.4 % — SIGNIFICANT CHANGE UP (ref 0–6)
EPI CELLS # UR: 3 /HPF — SIGNIFICANT CHANGE UP (ref 0–5)
GLUCOSE SERPL-MCNC: 103 MG/DL — HIGH (ref 70–99)
GLUCOSE UR QL: NEGATIVE — SIGNIFICANT CHANGE UP
HCT VFR BLD CALC: 36.3 % — SIGNIFICANT CHANGE UP (ref 34.5–45)
HGB BLD-MCNC: 12.3 G/DL — SIGNIFICANT CHANGE UP (ref 11.5–15.5)
HYALINE CASTS # UR AUTO: 0 /LPF — SIGNIFICANT CHANGE UP (ref 0–7)
IANC: 4.78 K/UL — SIGNIFICANT CHANGE UP (ref 1.5–8.5)
IMM GRANULOCYTES NFR BLD AUTO: 0.3 % — SIGNIFICANT CHANGE UP (ref 0–1.5)
KETONES UR-MCNC: NEGATIVE — SIGNIFICANT CHANGE UP
LEUKOCYTE ESTERASE UR-ACNC: ABNORMAL
LYMPHOCYTES # BLD AUTO: 1.37 K/UL — SIGNIFICANT CHANGE UP (ref 1–3.3)
LYMPHOCYTES # BLD AUTO: 20.2 % — SIGNIFICANT CHANGE UP (ref 13–44)
MAGNESIUM SERPL-MCNC: 2 MG/DL — SIGNIFICANT CHANGE UP (ref 1.6–2.6)
MCHC RBC-ENTMCNC: 30.8 PG — SIGNIFICANT CHANGE UP (ref 27–34)
MCHC RBC-ENTMCNC: 33.9 GM/DL — SIGNIFICANT CHANGE UP (ref 32–36)
MCV RBC AUTO: 91 FL — SIGNIFICANT CHANGE UP (ref 80–100)
MONOCYTES # BLD AUTO: 0.55 K/UL — SIGNIFICANT CHANGE UP (ref 0–0.9)
MONOCYTES NFR BLD AUTO: 8.1 % — SIGNIFICANT CHANGE UP (ref 2–14)
NEUTROPHILS # BLD AUTO: 4.78 K/UL — SIGNIFICANT CHANGE UP (ref 1.8–7.4)
NEUTROPHILS NFR BLD AUTO: 70.6 % — SIGNIFICANT CHANGE UP (ref 43–77)
NITRITE UR-MCNC: NEGATIVE — SIGNIFICANT CHANGE UP
NRBC # BLD: 0 /100 WBCS — SIGNIFICANT CHANGE UP
NRBC # FLD: 0 K/UL — SIGNIFICANT CHANGE UP
PH UR: 6.5 — SIGNIFICANT CHANGE UP (ref 5–8)
PHOSPHATE SERPL-MCNC: 3.1 MG/DL — SIGNIFICANT CHANGE UP (ref 2.5–4.5)
PLATELET # BLD AUTO: 244 K/UL — SIGNIFICANT CHANGE UP (ref 150–400)
POTASSIUM SERPL-MCNC: 4.2 MMOL/L — SIGNIFICANT CHANGE UP (ref 3.5–5.3)
POTASSIUM SERPL-SCNC: 4.2 MMOL/L — SIGNIFICANT CHANGE UP (ref 3.5–5.3)
PROT SERPL-MCNC: 6.7 G/DL — SIGNIFICANT CHANGE UP (ref 6–8.3)
PROT UR-MCNC: ABNORMAL
RBC # BLD: 3.99 M/UL — SIGNIFICANT CHANGE UP (ref 3.8–5.2)
RBC # FLD: 13.5 % — SIGNIFICANT CHANGE UP (ref 10.3–14.5)
RBC CASTS # UR COMP ASSIST: SIGNIFICANT CHANGE UP /HPF (ref 0–4)
SODIUM SERPL-SCNC: 127 MMOL/L — LOW (ref 135–145)
SP GR SPEC: 1.01 — SIGNIFICANT CHANGE UP (ref 1–1.05)
UROBILINOGEN FLD QL: SIGNIFICANT CHANGE UP
WBC # BLD: 6.78 K/UL — SIGNIFICANT CHANGE UP (ref 3.8–10.5)
WBC # FLD AUTO: 6.78 K/UL — SIGNIFICANT CHANGE UP (ref 3.8–10.5)
WBC UR QL: 11 /HPF — SIGNIFICANT CHANGE UP (ref 0–5)

## 2021-10-17 PROCEDURE — 93010 ELECTROCARDIOGRAM REPORT: CPT

## 2021-10-17 PROCEDURE — 71045 X-RAY EXAM CHEST 1 VIEW: CPT | Mod: 26

## 2021-10-17 PROCEDURE — 99285 EMERGENCY DEPT VISIT HI MDM: CPT | Mod: 25

## 2021-10-17 NOTE — ED ADULT NURSE NOTE - NSIMPLEMENTINTERV_GEN_ALL_ED
Implemented All Fall Risk Interventions:  [place] to call system. Call bell, personal items and telephone within reach. Instruct patient to call for assistance. Room bathroom lighting operational. Non-slip footwear when patient is off stretcher. Physically safe environment: no spills, clutter or unnecessary equipment. Stretcher in lowest position, wheels locked, appropriate side rails in place. Provide visual cue, wrist band, yellow gown, etc. Monitor gait and stability. Monitor for mental status changes and reorient to person, place, and time. Review medications for side effects contributing to fall risk. Reinforce activity limits and safety measures with patient and family.

## 2021-10-17 NOTE — ED ADULT TRIAGE NOTE - CHIEF COMPLAINT QUOTE
c/o dizziness and feeling shaky x2 days. Denies N+V, fever, chest pain, sob, HA, vision changes. Pt recently placed on lasix Saturday. PMHx HTN, GERD.

## 2021-10-17 NOTE — ED ADULT NURSE NOTE - OBJECTIVE STATEMENT
Received patient to room 29 for dizziness. A&o3 with intermittent confusion, ambulatory with steady gait, c/o lightheadedness for past day. Denies SOB, n/v. Pt ambulatory in ER, RR even and unlabored, left 20G AC placed, labs sent.

## 2021-10-18 DIAGNOSIS — I10 ESSENTIAL (PRIMARY) HYPERTENSION: ICD-10-CM

## 2021-10-18 DIAGNOSIS — R42 DIZZINESS AND GIDDINESS: ICD-10-CM

## 2021-10-18 DIAGNOSIS — E87.1 HYPO-OSMOLALITY AND HYPONATREMIA: ICD-10-CM

## 2021-10-18 DIAGNOSIS — Z29.9 ENCOUNTER FOR PROPHYLACTIC MEASURES, UNSPECIFIED: ICD-10-CM

## 2021-10-18 DIAGNOSIS — R82.90 UNSPECIFIED ABNORMAL FINDINGS IN URINE: ICD-10-CM

## 2021-10-18 DIAGNOSIS — E03.9 HYPOTHYROIDISM, UNSPECIFIED: ICD-10-CM

## 2021-10-18 LAB
24R-OH-CALCIDIOL SERPL-MCNC: 24.3 NG/ML — LOW (ref 30–80)
A1C WITH ESTIMATED AVERAGE GLUCOSE RESULT: 5 % — SIGNIFICANT CHANGE UP (ref 4–5.6)
ALBUMIN SERPL ELPH-MCNC: 4.3 G/DL — SIGNIFICANT CHANGE UP (ref 3.3–5)
ALP SERPL-CCNC: 49 U/L — SIGNIFICANT CHANGE UP (ref 40–120)
ALT FLD-CCNC: 10 U/L — SIGNIFICANT CHANGE UP (ref 4–33)
ANION GAP SERPL CALC-SCNC: 12 MMOL/L — SIGNIFICANT CHANGE UP (ref 7–14)
APTT BLD: 29.2 SEC — SIGNIFICANT CHANGE UP (ref 27–36.3)
AST SERPL-CCNC: 18 U/L — SIGNIFICANT CHANGE UP (ref 4–32)
BILIRUB SERPL-MCNC: 0.6 MG/DL — SIGNIFICANT CHANGE UP (ref 0.2–1.2)
BUN SERPL-MCNC: 11 MG/DL — SIGNIFICANT CHANGE UP (ref 7–23)
CALCIUM SERPL-MCNC: 8.9 MG/DL — SIGNIFICANT CHANGE UP (ref 8.4–10.5)
CHLORIDE SERPL-SCNC: 95 MMOL/L — LOW (ref 98–107)
CHOLEST SERPL-MCNC: 213 MG/DL — HIGH
CK MB BLD-MCNC: 3.9 % — HIGH (ref 0–2.5)
CK MB CFR SERPL CALC: 4.4 NG/ML — SIGNIFICANT CHANGE UP
CO2 SERPL-SCNC: 22 MMOL/L — SIGNIFICANT CHANGE UP (ref 22–31)
CREAT SERPL-MCNC: 0.65 MG/DL — SIGNIFICANT CHANGE UP (ref 0.5–1.3)
ESTIMATED AVERAGE GLUCOSE: 97 — SIGNIFICANT CHANGE UP
FOLATE SERPL-MCNC: 12 NG/ML — SIGNIFICANT CHANGE UP (ref 3.1–17.5)
GLUCOSE SERPL-MCNC: 89 MG/DL — SIGNIFICANT CHANGE UP (ref 70–99)
HCT VFR BLD CALC: 35.4 % — SIGNIFICANT CHANGE UP (ref 34.5–45)
HDLC SERPL-MCNC: 68 MG/DL — SIGNIFICANT CHANGE UP
HGB BLD-MCNC: 12.1 G/DL — SIGNIFICANT CHANGE UP (ref 11.5–15.5)
INR BLD: 1.13 RATIO — SIGNIFICANT CHANGE UP (ref 0.88–1.16)
LIPID PNL WITH DIRECT LDL SERPL: 131 MG/DL — HIGH
MAGNESIUM SERPL-MCNC: 2.2 MG/DL — SIGNIFICANT CHANGE UP (ref 1.6–2.6)
MCHC RBC-ENTMCNC: 31.3 PG — SIGNIFICANT CHANGE UP (ref 27–34)
MCHC RBC-ENTMCNC: 34.2 GM/DL — SIGNIFICANT CHANGE UP (ref 32–36)
MCV RBC AUTO: 91.7 FL — SIGNIFICANT CHANGE UP (ref 80–100)
NON HDL CHOLESTEROL: 145 MG/DL — HIGH
NRBC # BLD: 0 /100 WBCS — SIGNIFICANT CHANGE UP
NRBC # FLD: 0 K/UL — SIGNIFICANT CHANGE UP
OSMOLALITY SERPL: 266 MOSM/KG — LOW (ref 275–295)
OSMOLALITY UR: 447 MOSM/KG — SIGNIFICANT CHANGE UP (ref 50–1200)
PHOSPHATE SERPL-MCNC: 3 MG/DL — SIGNIFICANT CHANGE UP (ref 2.5–4.5)
PLATELET # BLD AUTO: 229 K/UL — SIGNIFICANT CHANGE UP (ref 150–400)
POTASSIUM SERPL-MCNC: 3.7 MMOL/L — SIGNIFICANT CHANGE UP (ref 3.5–5.3)
POTASSIUM SERPL-SCNC: 3.7 MMOL/L — SIGNIFICANT CHANGE UP (ref 3.5–5.3)
PROT SERPL-MCNC: 6.6 G/DL — SIGNIFICANT CHANGE UP (ref 6–8.3)
PROTHROM AB SERPL-ACNC: 12.9 SEC — SIGNIFICANT CHANGE UP (ref 10.6–13.6)
RBC # BLD: 3.86 M/UL — SIGNIFICANT CHANGE UP (ref 3.8–5.2)
RBC # FLD: 13.6 % — SIGNIFICANT CHANGE UP (ref 10.3–14.5)
SARS-COV-2 RNA SPEC QL NAA+PROBE: SIGNIFICANT CHANGE UP
SODIUM SERPL-SCNC: 129 MMOL/L — LOW (ref 135–145)
SODIUM UR-SCNC: 131 MMOL/L — SIGNIFICANT CHANGE UP
TRIGL SERPL-MCNC: 72 MG/DL — SIGNIFICANT CHANGE UP
TROPONIN T, HIGH SENSITIVITY RESULT: 23 NG/L — SIGNIFICANT CHANGE UP
TSH SERPL-MCNC: 4.85 UIU/ML — HIGH (ref 0.27–4.2)
WBC # BLD: 5.05 K/UL — SIGNIFICANT CHANGE UP (ref 3.8–10.5)
WBC # FLD AUTO: 5.05 K/UL — SIGNIFICANT CHANGE UP (ref 3.8–10.5)

## 2021-10-18 PROCEDURE — 99223 1ST HOSP IP/OBS HIGH 75: CPT

## 2021-10-18 RX ORDER — ACETAMINOPHEN 500 MG
650 TABLET ORAL EVERY 6 HOURS
Refills: 0 | Status: DISCONTINUED | OUTPATIENT
Start: 2021-10-18 | End: 2021-10-19

## 2021-10-18 RX ORDER — SOD,AMMONIUM,POTASSIUM LACTATE
1 CREAM (GRAM) TOPICAL
Refills: 0 | Status: DISCONTINUED | OUTPATIENT
Start: 2021-10-18 | End: 2021-10-19

## 2021-10-18 RX ORDER — INFLUENZA VIRUS VACCINE 15; 15; 15; 15 UG/.5ML; UG/.5ML; UG/.5ML; UG/.5ML
0.5 SUSPENSION INTRAMUSCULAR ONCE
Refills: 0 | Status: DISCONTINUED | OUTPATIENT
Start: 2021-10-18 | End: 2021-10-18

## 2021-10-18 RX ORDER — AMLODIPINE BESYLATE 2.5 MG/1
5 TABLET ORAL
Refills: 0 | Status: DISCONTINUED | OUTPATIENT
Start: 2021-10-18 | End: 2021-10-19

## 2021-10-18 RX ORDER — POLYETHYLENE GLYCOL 3350 17 G/17G
17 POWDER, FOR SOLUTION ORAL DAILY
Refills: 0 | Status: DISCONTINUED | OUTPATIENT
Start: 2021-10-18 | End: 2021-10-19

## 2021-10-18 RX ORDER — LEVOTHYROXINE SODIUM 125 MCG
37.5 TABLET ORAL DAILY
Refills: 0 | Status: DISCONTINUED | OUTPATIENT
Start: 2021-10-18 | End: 2021-10-19

## 2021-10-18 RX ORDER — SODIUM CHLORIDE 9 MG/ML
2 INJECTION INTRAMUSCULAR; INTRAVENOUS; SUBCUTANEOUS THREE TIMES A DAY
Refills: 0 | Status: DISCONTINUED | OUTPATIENT
Start: 2021-10-18 | End: 2021-10-19

## 2021-10-18 RX ORDER — CHOLECALCIFEROL (VITAMIN D3) 125 MCG
1000 CAPSULE ORAL DAILY
Refills: 0 | Status: DISCONTINUED | OUTPATIENT
Start: 2021-10-18 | End: 2021-10-19

## 2021-10-18 RX ORDER — METOPROLOL TARTRATE 50 MG
25 TABLET ORAL DAILY
Refills: 0 | Status: DISCONTINUED | OUTPATIENT
Start: 2021-10-18 | End: 2021-10-19

## 2021-10-18 RX ORDER — INFLUENZA VIRUS VACCINE 15; 15; 15; 15 UG/.5ML; UG/.5ML; UG/.5ML; UG/.5ML
0.7 SUSPENSION INTRAMUSCULAR ONCE
Refills: 0 | Status: DISCONTINUED | OUTPATIENT
Start: 2021-10-18 | End: 2021-10-19

## 2021-10-18 RX ADMIN — AMLODIPINE BESYLATE 5 MILLIGRAM(S): 2.5 TABLET ORAL at 17:46

## 2021-10-18 RX ADMIN — POLYETHYLENE GLYCOL 3350 17 GRAM(S): 17 POWDER, FOR SOLUTION ORAL at 11:25

## 2021-10-18 RX ADMIN — SODIUM CHLORIDE 2 GRAM(S): 9 INJECTION INTRAMUSCULAR; INTRAVENOUS; SUBCUTANEOUS at 22:38

## 2021-10-18 RX ADMIN — Medication 37.5 MICROGRAM(S): at 08:48

## 2021-10-18 RX ADMIN — AMLODIPINE BESYLATE 5 MILLIGRAM(S): 2.5 TABLET ORAL at 09:19

## 2021-10-18 RX ADMIN — Medication 25 MILLIGRAM(S): at 09:19

## 2021-10-18 NOTE — H&P ADULT - PROBLEM SELECTOR PLAN 2
Monitor Electrolytes  FU Urine electrolytes and osmolality   Consider Renal evaluation Monitor Electrolytes  FU Urine electrolytes and osmolality   Regular diet   Consider Renal evaluation Sodium = 127 (133 on 09/19/2021)  Calculated serum osmolality = 265  Monitor Electrolytes  FU Urine electrolytes and osmolality   Regular diet (without sodium restriction)  May need Renal evaluation

## 2021-10-18 NOTE — H&P ADULT - NEGATIVE GENERAL GENITOURINARY SYMPTOMS
no hematuria/no flank pain L/no flank pain R/no dysuria/no urinary hesitancy/normal urinary frequency

## 2021-10-18 NOTE — PROGRESS NOTE ADULT - PROBLEM SELECTOR PLAN 3
UA with "large" leukocyte esterase, trace protein, moderate blood (RBC = 6 to 11).  No leukocytosis on serum  Patient asymptomatic (no burning, itching, dysuria).  No fever  Holding off on antibiotic for now  Optimal hydration  If becomes symptomatic, febrile, would start abx  f/u urine culture (ordered) UA with "large" leukocyte esterase, trace protein, moderate blood (RBC = 6 to 11).  No leukocytosis on serum  Patient asymptomatic (no burning, itching, dysuria).  No fever. Pt placing her own pessaries at home. Does have ongoing issues with polyuria per son.   Holding off on antibiotic for now  Optimal hydration  If becomes symptomatic, febrile, would start abx  f/u urine culture (ordered)

## 2021-10-18 NOTE — H&P ADULT - ASSESSMENT
This is an 88 year old female with a Pmhx of HTN, Hypothyroidism, GERD, IBS, hyponatremia presents with dizziness, lightheadedness, and generalized weakness for 3 days found to be hyponatremic.

## 2021-10-18 NOTE — H&P ADULT - PROBLEM SELECTOR PLAN 1
- orthostatics   - Echo- EF 58% on 02/2021  - monitor on tele   - fall precautions   - ambulate w/ assistance   - PT eval - Tele monitor   - orthostatics   - Echo- EF 58% on 02/2021  - monitor on tele   - fall precautions   - ambulate w/ assistance   - PT mairaal Patient unable to clarify/recall specifics re dizziness.  E.g. unable to identify if occurred from sitting to standing.  Reportedly started furosemide ~ 2 days ago  Although UA suggestive of infection, patient asymptomatic.  COVID-19 PCR negative  - orthostatics ordered  - Echo- EF 58% on 02/2021 (no other info report info available at present); f/up w/ OP provider for complete report  - continues on Telemetry monitoring  - fall precautions   - ambulate w/ assistance   - f/u AM lab-work, including vitamin levels  - PT eval

## 2021-10-18 NOTE — PROGRESS NOTE ADULT - PROBLEM SELECTOR PLAN 1
Patient unable to clarify/recall specifics re dizziness.  E.g. unable to identify if occurred from sitting to standing.  Reportedly started furosemide ~ 2 days ago  Although UA suggestive of infection, patient asymptomatic.  COVID-19 PCR negative  - orthostatics ordered  - Echo- EF 58% on 02/2021 (no other info report info available at present); f/up w/ OP provider for complete report  - continues on Telemetry monitoring  - fall precautions   - ambulate w/ assistance   - f/u AM lab-work, including vitamin levels  - PT eval Patient unable to clarify/recall specifics re dizziness.  E.g. unable to identify if occurred from sitting to standing.  Ongoing issue x 6 months per son. Unclear if dizziness is related to hypona. BP elevated (150s SBP) which could be a factor. Additionally, anxiety/depression could also be at play given isolation she has been experiencing since the start of the pandemic per son. Reportedly started furosemide ~ 2 days ago which may have worsened Na level. Difficult to determine as pts son has also bene pushing her to drink more fluids at home.COVID-19 PCR negative  - orthostatics on admissino  - Echo- EF 58% on 02/2021 (no other info report info available at present); f/up w/ OP provider for complete report  - Nephrology consult as below  - continues on Telemetry monitoring  - fall precautions   - ambulate w/ assistance   - f/u vitamin levels  - PT tyrone

## 2021-10-18 NOTE — ED ADULT NURSE REASSESSMENT NOTE - NS ED NURSE REASSESS COMMENT FT1
Pt c/o sudden nausea and lightheadedness that is not resolving on its own. ACP paged and made aware, no inventions per MD at this time. No neuro deficits noted, pt resting in bed.

## 2021-10-18 NOTE — H&P ADULT - PROBLEM SELECTOR PLAN 4
FU TSH Level   Contitue Continue Synthroid Low fat, cholesterol diet. (Due to hyponatremia, no sodium restriction at present)  Continue Antihypertensive medications

## 2021-10-18 NOTE — CONSULT NOTE ADULT - PROBLEM SELECTOR RECOMMENDATION 9
Pt with acute on chronic hypotonic hyponatremia, uncertain etiology. Could be from NSAIDs, also may be SIADH? given sodium improve with limited water intake. Noted to have prior episode of hyponatremia in Sep 2021, noted Na of 128 on 9/18/21, then improved to 133 on discharge with NS. On this admission, initial sodium was 127 on 10/17, then improved to 129 today.  Serum Osm 266 and urine Osm 447. Awaiting urine Na.    Recommend to start on sodium chloride 2 g PO TID. Obtain urine Na. Fluid restriction to < 1L per day. Monitor Na level.     If any questions, please feel free to contact me     Jimmy Bourgeois  Nephrology Fellow  [facility] Pager: [phone]  [place] Pager: [phone]

## 2021-10-18 NOTE — CONSULT NOTE ADULT - SUBJECTIVE AND OBJECTIVE BOX
[facility] DIVISION OF KIDNEY DISEASES AND HYPERTENSION -- [phone]  -- INITIAL CONSULT NOTE  --------------------------------------------------------------------------------  HPI: Patient is an 87 y/o female with past medical history of HTN, hypothyroidism, GERD, IBS and hyponatremia presented to [facility] c/o of generalized weakness and dizziness for 3 days prior to admission. Nephrology consulted for hyponatremia. Noted to have prior episode of hyponatremia in Sep,         PAST HISTORY  --------------------------------------------------------------------------------  PAST MEDICAL & SURGICAL HISTORY:  Hypothyroid    HTN (hypertension)    Hiatal hernia    Gastroesophageal reflux disease without esophagitis    IBS (irritable bowel syndrome)    Hyponatremia    S/P colon resection  removal mof being colon polyp    S/P inguinal hernia repair  left 5 years ago    H/O hernia repair  12/22/2015      FAMILY HISTORY:  Family history non-contributory      PAST SOCIAL HISTORY:    ALLERGIES & MEDICATIONS  --------------------------------------------------------------------------------  Allergies    No Known Allergies    Intolerances      Standing Inpatient Medications  amLODIPine   Tablet 5 milliGRAM(s) Oral <User Schedule>  ammonium lactate 12% Lotion 1 Application(s) Topical two times a day  cholecalciferol 1000 Unit(s) Oral daily  levothyroxine 37.5 MICROGram(s) Oral daily  metoprolol succinate ER 25 milliGRAM(s) Oral daily  polyethylene glycol 3350 17 Gram(s) Oral daily    PRN Inpatient Medications  acetaminophen   Tablet .. 650 milliGRAM(s) Oral every 6 hours PRN      REVIEW OF SYSTEMS  --------------------------------------------------------------------------------  Gen: No fevers/chills  Skin: No rashes  Head/Eyes/Ears: Normal hearing,   Respiratory: No dyspnea, cough  CV: No chest pain  GI: No abdominal pain, diarrhea  : No dysuria, hematuria  MSK: No  edema  Heme: No easy bruising or bleeding  Psych: No significant depression    All other systems were reviewed and are negative, except as noted.    VITALS/PHYSICAL EXAM  --------------------------------------------------------------------------------  T(C): 36.9 (10-18-21 @ 12:13), Max: 36.9 (10-18-21 @ 03:06)  HR: 70 (10-18-21 @ 12:13) (70 - 86)  BP: 155/91 (10-18-21 @ 12:13) (143/85 - 175/91)  RR: 18 (10-18-21 @ 12:13) (16 - 18)  SpO2: 98% (10-18-21 @ 12:13) (97% - 100%)  Wt(kg): --  Height (cm): 157.5 (10-17-21 @ 20:35)  Weight (kg): 50 (10-18-21 @ 06:30)  BMI (kg/m2): 20.2 (10-18-21 @ 06:30)  BSA (m2): 1.48 (10-18-21 @ 06:30)      Physical Exam:  	Gen: NAD  	HEENT: MMM  	Pulm: CTA B/L  	CV: S1S2  	Abd: Soft, +BS   	Ext: No LE edema B/L  	Neuro: Awake  	Skin: Warm and dry  	Vascular access:    LABS/STUDIES  --------------------------------------------------------------------------------              12.1   5.05  >-----------<  229      [10-18-21 @ 07:02]              35.4     129  |  95  |  11  ----------------------------<  89      [10-18-21 @ 07:02]  3.7   |  22  |  0.65        Ca     8.9     [10-18-21 @ 07:02]      Mg     2.20     [10-18-21 @ 07:02]      Phos  3.0     [10-18-21 @ 07:02]    TPro  6.6  /  Alb  4.3  /  TBili  0.6  /  DBili  x   /  AST  18  /  ALT  10  /  AlkPhos  49  [10-18-21 @ 07:02]    PT/INR: PT 12.9 , INR 1.13       [10-18-21 @ 07:02]  PTT: 29.2       [10-18-21 @ 07:02]          [10-18-21 @ 07:17]  Serum Osmolality 266      [10-17-21 @ 23:03]    Creatinine Trend:  SCr 0.65 [10-18 @ 07:02]  SCr 0.65 [10-17 @ 23:05]  SCr 0.75 [09-19 @ 07:19]    Urinalysis - [10-17-21 @ 23:05]      Color Light Yellow / Appearance Clear / SG 1.015 / pH 6.5      Gluc Negative / Ketone Negative  / Bili Negative / Urobili <2 mg/dL       Blood Moderate / Protein Trace / Leuk Est Large / Nitrite Negative      RBC 6-11 / WBC 11 / Hyaline 0 / Gran  / Sq Epi  / Non Sq Epi 3 / Bacteria Negative    Urine Osmolality 447      [10-17-21 @ 23:03]    Vitamin D (25OH) 24.3      [10-18-21 @ 09:45]  TSH 4.85      [10-18-21 @ 07:02]  Lipid: chol 213, TG 72, HDL 68, LDL --      [10-18-21 @ 07:02]       [facility] DIVISION OF KIDNEY DISEASES AND HYPERTENSION -- [phone]  -- INITIAL CONSULT NOTE  --------------------------------------------------------------------------------  HPI: Patient is an 87 y/o female with past medical history of HTN, hypothyroidism, GERD, IBS and hyponatremia presented to [facility] c/o of generalized weakness and dizziness for 3 days prior to admission. Nephrology consulted for hyponatremia. Noted to have prior episode of hyponatremia in Sep 2021, noted Na of 128 on 9/18/21, then improved to 133 on discharge with NS. On this admission, initial sodium was 127 on 10/17, then improved to 129 today.      Patient said she does not know that she has history of hyponatremia.    Patient was seen and examined at bedside. Reported feeling well. Denies CP, SOB, fever, chills, nausea, vomiting, diarrhea, LE edema or dysuria.      PAST HISTORY  --------------------------------------------------------------------------------  PAST MEDICAL & SURGICAL HISTORY:  Hypothyroid    HTN (hypertension)    Hiatal hernia    Gastroesophageal reflux disease without esophagitis    IBS (irritable bowel syndrome)    Hyponatremia    S/P colon resection  removal mof being colon polyp    S/P inguinal hernia repair  left 5 years ago    H/O hernia repair  12/22/2015      FAMILY HISTORY:  Family history non-contributory      PAST SOCIAL HISTORY:    ALLERGIES & MEDICATIONS  --------------------------------------------------------------------------------  Allergies    No Known Allergies    Intolerances      Standing Inpatient Medications  amLODIPine   Tablet 5 milliGRAM(s) Oral <User Schedule>  ammonium lactate 12% Lotion 1 Application(s) Topical two times a day  cholecalciferol 1000 Unit(s) Oral daily  levothyroxine 37.5 MICROGram(s) Oral daily  metoprolol succinate ER 25 milliGRAM(s) Oral daily  polyethylene glycol 3350 17 Gram(s) Oral daily    PRN Inpatient Medications  acetaminophen   Tablet .. 650 milliGRAM(s) Oral every 6 hours PRN    REVIEW OF SYSTEMS  --------------------------------------------------------------------------------  Gen: No fevers/chills  Respiratory: No dyspnea, cough  CV: No chest pain  GI: No abdominal pain, diarrhea  : No dysuria, hematuria  MSK: No  edema    All other systems were reviewed and are negative, except as noted.    VITALS/PHYSICAL EXAM  --------------------------------------------------------------------------------  T(C): 36.9 (10-18-21 @ 12:13), Max: 36.9 (10-18-21 @ 03:06)  HR: 70 (10-18-21 @ 12:13) (70 - 86)  BP: 155/91 (10-18-21 @ 12:13) (143/85 - 175/91)  RR: 18 (10-18-21 @ 12:13) (16 - 18)  SpO2: 98% (10-18-21 @ 12:13) (97% - 100%)  Wt(kg): --  Height (cm): 157.5 (10-17-21 @ 20:35)  Weight (kg): 50 (10-18-21 @ 06:30)  BMI (kg/m2): 20.2 (10-18-21 @ 06:30)  BSA (m2): 1.48 (10-18-21 @ 06:30)    Physical Exam:  	Gen: NAD  	HEENT: MMM  	Pulm: CTA B/L, no crackles   	CV: S1S2  	Abd: Soft, +BS   	Ext: trace LE edema B/L  	Neuro: Awake  	Skin: Warm and dry    LABS/STUDIES  --------------------------------------------------------------------------------              12.1   5.05  >-----------<  229      [10-18-21 @ 07:02]              35.4     129  |  95  |  11  ----------------------------<  89      [10-18-21 @ 07:02]  3.7   |  22  |  0.65        Ca     8.9     [10-18-21 @ 07:02]      Mg     2.20     [10-18-21 @ 07:02]      Phos  3.0     [10-18-21 @ 07:02]    TPro  6.6  /  Alb  4.3  /  TBili  0.6  /  DBili  x   /  AST  18  /  ALT  10  /  AlkPhos  49  [10-18-21 @ 07:02]    PT/INR: PT 12.9 , INR 1.13       [10-18-21 @ 07:02]  PTT: 29.2       [10-18-21 @ 07:02]          [10-18-21 @ 07:17]  Serum Osmolality 266      [10-17-21 @ 23:03]    Creatinine Trend:  SCr 0.65 [10-18 @ 07:02]  SCr 0.65 [10-17 @ 23:05]  SCr 0.75 [09-19 @ 07:19]    Urinalysis - [10-17-21 @ 23:05]      Color Light Yellow / Appearance Clear / SG 1.015 / pH 6.5      Gluc Negative / Ketone Negative  / Bili Negative / Urobili <2 mg/dL       Blood Moderate / Protein Trace / Leuk Est Large / Nitrite Negative      RBC 6-11 / WBC 11 / Hyaline 0 / Gran  / Sq Epi  / Non Sq Epi 3 / Bacteria Negative    Urine Osmolality 447      [10-17-21 @ 23:03]    Vitamin D (25OH) 24.3      [10-18-21 @ 09:45]  TSH 4.85      [10-18-21 @ 07:02]  Lipid: chol 213, TG 72, HDL 68, LDL --      [10-18-21 @ 07:02]

## 2021-10-18 NOTE — ED PROVIDER NOTE - ATTENDING CONTRIBUTION TO CARE
DR. WILLIS, ATTENDING MD-  I performed a face to face bedside interview with the patient regarding history of present illness, review of symptoms and past medical history. I completed an independent physical exam.  I have discussed the patient's plan of care with the resident.   Documentation as above in the note.    87 y/o female h/o htn hyponatremia gerd recently started on lasix Saturday here with gen fatigue x3 days.  Recent admission for hyponatremia.  Eval for dehydration, lyte abn.  Obtain cbc cmp trop ekg cxr ua ucx covid swab likely admission.

## 2021-10-18 NOTE — ED PROVIDER NOTE - OBJECTIVE STATEMENT
87 y/o F, PMH of HTN and Hypothyroidism, presents to ED c/o lightheadedness/dizziness and generalized weakness/fatigue x 2-3 days. Of note pt had similar presentation to [facility] last month, was found to be hyponatremic, and admitted to the hospital for fluid restriction and DDAVP. Pt denies f/c, CP, SOB, abd pain, n/v/d, urinary sx, weakness/numbness/tingling, or any other symptoms at this time. 87 y/o F, PMH of HTN and Hypothyroidism, presents to ED c/o lightheadedness/dizziness and generalized weakness/fatigue x 2-3 days. Of note pt had similar presentation to [facility] last month, was found to be hyponatremic, and admitted to the hospital for fluid restriction and DDAVP. Pt denies f/c, CP, SOB, abd pain, n/v/d, urinary sx, weakness/numbness/tingling, or any other symptoms at this time. 87 y/o F, PMH of HTN and Hypothyroidism, presents to ED c/o lightheadedness/dizziness and generalized weakness/fatigue x 2-3 days. Of note pt had similar presentation to [facility] last month, was found to be hyponatremic, and admitted to the hospital for fluid restriction and DDAVP. Pt also notes that she was started on lasix on Saturday. Pt denies f/c, CP, SOB, abd pain, n/v/d, urinary sx, weakness/numbness/tingling, or any other symptoms at this time.

## 2021-10-18 NOTE — PHYSICAL THERAPY INITIAL EVALUATION ADULT - GENERAL OBSERVATIONS, REHAB EVAL
patient received in ED stretcher in [facility]. patient denies CP, HA, SOB, dizziness

## 2021-10-18 NOTE — H&P ADULT - NEGATIVE NEUROLOGICAL SYMPTOMS
no transient paralysis/no focal seizures/no syncope/no vertigo/no loss of sensation/no difficulty walking/no headache

## 2021-10-18 NOTE — ED PROVIDER NOTE - NS ED ROS FT
Gen: Denies fever, weight loss; +generalized malaise  HEENT: Denies vision changes, ear pain, epistaxis, sore throat  CV: Denies chest pain, palpitations  Skin: Denies rash, erythema, color changes  Resp: Denies SOB, cough  Endo: Denies sensitivity to heat, cold, increased urination  GI: Denies abdominal pain, constipation, nausea, vomiting, diarrhea  Msk: Denies back pain, LE swelling, extremity pain  : Denies dysuria, increased frequency  Neuro: Denies LOC, weakness, numbness, tingling; +lightheadedness, +dizziness  Psych: Denies hx of psych, hallucinations  ROS statement: all other ROS negative except as per HPI

## 2021-10-18 NOTE — H&P ADULT - HISTORY OF PRESENT ILLNESS
This is an 88 year old female with a Pmhx of HTN, Hypothyroidism, GERD, IBS, hyponatremia presents with dizziness, lightheadedness, and generalized weakness for 3 days.  She denies fever, chills, diaphoresis, changes in vision, eye pain, diplopia, photophobia, nausea, vomiting, headache,  blurred vision, ear and throat pain, changes in hearing, tinnitus, wheezing, dyspnea, hemoptysis, chest pain, SOB, AGRAWAL, orthopnea, PND, palpitations, cough, abdominal pain, heartburn, gas, changes in bowel habits, melena, hematochezia, dysuria, hematuria, changes in urinary frequency, urgency, and dysuria, paresthesia, seizures, tremors, fall, or Loss of consciousness.   Per Pt, she does have a walker but she doesn't use it.  This is an 88 year old female with a Pmhx of HTN, Hypothyroidism, GERD, IBS, hyponatremia presents with dizziness, lightheadedness, and generalized weakness for 3 days.  She denies fever, chills, diaphoresis, changes in vision, eye pain, diplopia, photophobia, nausea, vomiting, headache,  blurred vision, ear and throat pain, changes in hearing, tinnitus, wheezing, dyspnea, hemoptysis, chest pain, SOB, AGRAWAL, orthopnea, PND, palpitations, cough, abdominal pain, heartburn, gas, changes in bowel habits, melena, hematochezia, dysuria, hematuria, changes in urinary frequency, urgency, and dysuria, paresthesia, seizures, tremors, fall, or Loss of consciousness.   Per Pt, she does have a walker but she doesn't use it. Pt was recently admitted with the same symptoms on the setting of Hyponatremia.  She was treated with DDAVP and fluid restriction

## 2021-10-18 NOTE — PHYSICAL THERAPY INITIAL EVALUATION ADULT - GAIT DEVIATIONS NOTED, PT EVAL
decreased haley/increased time in double stance/decreased velocity of limb motion/decreased swing-to-stance ratio/decreased weight-shifting ability

## 2021-10-18 NOTE — PROGRESS NOTE ADULT - PROBLEM SELECTOR PLAN 2
Sodium = 127 (133 on 09/19/2021)  Calculated serum osmolality = 265  Monitor Electrolytes  FU Urine electrolytes and osmolality   Regular diet (without sodium restriction)  May need Renal evaluation Sodium = 127 (133 on 09/19/2021)--> 129.  Calculated serum osmolality = 265  Monitor Electrolytes  FU Urine electrolytes and osmolality   Regular diet (without sodium restriction)  Nutrition eval  Consult Renal

## 2021-10-18 NOTE — H&P ADULT - NSHPLABSRESULTS_GEN_ALL_CORE
12.3   6.78  )-----------( 244      ( 17 Oct 2021 23:05 )             36.3   10-17    127<L>  |  92<L>  |  15  ----------------------------<  103<H>  4.2   |  20<L>  |  0.65    Ca    8.8      17 Oct 2021 23:05  Phos  3.1     10-17  Mg     2.00     10-17    TPro  6.7  /  Alb  4.4  /  TBili  0.5  /  DBili  x   /  AST  22  /  ALT  14  /  AlkPhos  48  10-17

## 2021-10-18 NOTE — PHARMACOTHERAPY INTERVENTION NOTE - COMMENTS
Medication list confirmed with outpatient pharmacy ([facility]).   Of Note:   - Metoprolol ER 25mg Tabs: Pharmacy has a prescription on file (sent over in Sept/2021), however was never filled/picked up.

## 2021-10-18 NOTE — PROGRESS NOTE ADULT - PROBLEM SELECTOR PLAN 4
Low fat, cholesterol diet. (Due to hyponatremia, no sodium restriction at present)  Continue Antihypertensive medications Low fat, cholesterol diet. (Due to hyponatremia, no sodium restriction at present). Uncontrolled BP.   Continue Antihypertensive medications. May need to add additional BP medications (ACEi/ARB) once Na levels are better controlled.

## 2021-10-18 NOTE — H&P ADULT - ATTENDING SUPERVISION STATEMENT
Post-Care Instructions: I reviewed with the patient in detail post-care instructions. Patient is to keep the biopsy site dry overnight, and then apply bacitracin twice daily until healed. Patient may apply hydrogen peroxide soaks to remove any crusting. ACP

## 2021-10-18 NOTE — ED PROVIDER NOTE - CLINICAL SUMMARY MEDICAL DECISION MAKING FREE TEXT BOX
87 y/o F, PMH of HTN and Hypothyroidism, presents to ED c/o lightheadedness/dizziness and generalized weakness/fatigue x 2-3 days. Recent admission to [facility] for hyponatremia.  VSS. Physical exam unremarkable. EKG is NSR w/o ischemic changes.  Plan to check basic labs, CXR, and UA. Reassess. Likely CDU vs. Admission if hyponatremic.

## 2021-10-18 NOTE — H&P ADULT - ATTENDING COMMENTS
88 year old female, with past history as above, being managed for recurrent dizziness.  Treated w/ fluid restriction and DDAVP at last admission.  Reportedly started furosemide ~ 2 days ago.  Sodium currently 127 (133 on 09/19/2021).  Unlikely that sodium level is the cause of dizziness at present; dizziness may be multifactorial.  ECG = NSR at 76bpm, QTc = 459.  Orthostatics ordered.  Vit B12, folate, Vit B1, Vit D levels in the AM.  UA w/ signs of infection, but patient asymptomatic.; holding off on antibiotic for now.  If signs of emerging infection, would start abx.  F/up urine culture (ordered).  May need Nephrology consult re the hyponatremia.  F/up lab-work, TTE, outpatient records.    All other management as prescribed.

## 2021-10-18 NOTE — PROGRESS NOTE ADULT - SUBJECTIVE AND OBJECTIVE BOX
Pt is alert and pleasant during exam. Reports no new medications (though per chart review she was recently started on lasix). Could not recall recent hospitalization details. Lives at home alone.  No falls. Prepares her own meals-- reports eating mainly cereal, toast, egg, chicken daily. States she is taking her own medications. Takes miralax approx daily for ongoing constipation issues. No weight changes. No reports of polydipsia.     12:05 phone call to Cheng pt's son for collateral information.     Pt is alert and pleasant during exam. Reports no new medications (though per chart review she was recently started on lasix). Could not recall recent hospitalization details. Lives at home alone.  No falls. Prepares her own meals-- reports eating mainly cereal, toast, egg, chicken daily. States she is taking her own medications. Takes miralax approx daily for ongoing constipation issues. No weight changes. No reports of polydipsia.     12:05 phone call to Cheng, pt's son for collateral information. States for the past 6 months pt has been having      Pt is alert and pleasant during exam. Reports no new medications (though per chart review she was recently started on lasix). Could not recall recent hospitalization details. Lives at home alone.  No falls. Prepares her own meals-- reports eating mainly cereal, toast, egg, chicken daily. States she is taking her own medications. Takes miralax approx daily for ongoing constipation issues. No weight changes. No reports of polydipsia.     12:05-12:30 phone call to Cheng, pt's son for collateral information. States for the past 6 months pt has been having dizziness. Has also been having issues with Na levels being in the 126-134 range. Recently he started encouraging her to eat more salt in her diet, as well as encouraged gatorade and propel drinks TID (he was told to do this by her PCP). However, he does not think that she has been drinking them. Denies weight changes. Says prepandemic that she was quite active, but not anymore. Says she recently started seeing a Nephrologist (Dr. Trevin Rios on  [facility]) who started her on lasix 20 mg daily 3 days ago.     Vital Signs Last 24 Hrs  T(C): 36.9 (18 Oct 2021 12:13), Max: 36.9 (18 Oct 2021 03:06)  T(F): 98.5 (18 Oct 2021 12:13), Max: 98.5 (18 Oct 2021 03:06)  HR: 70 (18 Oct 2021 12:13) (70 - 86)  BP: 155/91 (18 Oct 2021 12:13) (143/85 - 175/91)  BP(mean): --  RR: 18 (18 Oct 2021 12:13) (16 - 18)  SpO2: 98% (18 Oct 2021 12:13) (97% - 100%)    CONSTITUTIONAL: Well-groomed, in no apparent distress  EYES: No conjunctival or scleral injection, non-icteric; PERRLA and symmetric  ENMT: No external nasal lesions; nasal mucosa not inflamed; normal dentition; no pharyngeal injection or exudates, oral mucosa with moist membranes  NECK: Trachea midline without palpable neck mass; thyroid not enlarged and non-tender  RESPIRATORY: Breathing comfortably; no dullness to percussion; lungs CTA without wheeze/rhonchi/rales  CARDIOVASCULAR: +S1S2, RRR, no M/G/R; no carotid bruits; pedal pulses full and symmetric; no lower extremity edema  GASTROINTESTINAL: No palpable masses or tenderness, +BS throughout, no rebound/guarding; no hepatosplenomegaly; surgical scar on abdomen vertically in LLQ, pt cannot recall what from  MUSCULOSKELETAL: No digital clubbing or cyanosis  SKIN: No rashes or ulcers noted; no subcutaneous nodules or induration palpable  NEUROLOGIC: CN II-XII grossly intact  PSYCHIATRIC: A+O x 3; mood and affect appropriate; appropriate insight and judgment, forgetful however can recall her son's phone number        LABS:                        12.1   5.05  )-----------( 229      ( 18 Oct 2021 07:02 )             35.4     10-18    129<L>  |  95<L>  |  11  ----------------------------<  89  3.7   |  22  |  0.65    Ca    8.9      18 Oct 2021 07:02  Phos  3.0     10-18  Mg     2.20     10-18    TPro  6.6  /  Alb  4.3  /  TBili  0.6  /  DBili  x   /  AST  18  /  ALT  10  /  AlkPhos  49  10-18    PT/INR - ( 18 Oct 2021 07:02 )   PT: 12.9 sec;   INR: 1.13 ratio         PTT - ( 18 Oct 2021 07:02 )  PTT:29.2 sec  CARDIAC MARKERS ( 18 Oct 2021 07:17 )  x     / x     / 112 U/L / x     / x      CARDIAC MARKERS ( 18 Oct 2021 07:02 )  x     / x     / x     / x     / 4.4 ng/mL      Urinalysis Basic - ( 17 Oct 2021 23:05 )    Color: Light Yellow / Appearance: Clear / S.015 / pH: x  Gluc: x / Ketone: Negative  / Bili: Negative / Urobili: <2 mg/dL   Blood: x / Protein: Trace / Nitrite: Negative   Leuk Esterase: Large / RBC: 6-11 /HPF / WBC 11 /HPF   Sq Epi: x / Non Sq Epi: 3 /HPF / Bacteria: Negative        RADIOLOGY & ADDITIONAL TESTS:    Imaging Personally Reviewed:

## 2021-10-18 NOTE — H&P ADULT - NSICDXPASTMEDICALHX_GEN_ALL_CORE_FT
PAST MEDICAL HISTORY:  Gastroesophageal reflux disease without esophagitis     Hiatal hernia     HTN (hypertension)     Hyponatremia     Hypothyroid     IBS (irritable bowel syndrome)

## 2021-10-18 NOTE — PATIENT PROFILE ADULT - PATIENT REPRESENTATIVE: ( YOU CAN CHOOSE ANY PERSON THAT CAN ASSIST YOU WITH YOUR HEALTH CARE PREFERENCES, DOES NOT HAVE TO BE A SPOUSE, IMMEDIATE FAMILY OR SIGNIFICANT OTHER/PARTNER)
declines
You can access the FollowMyHealth Patient Portal offered by [facility] by registering at the following website: [email or web address]. By joining [employer]’s FollowMyHealth portal, you will also be able to view your health information using other applications (apps) compatible with our system.

## 2021-10-18 NOTE — ED PROVIDER NOTE - PHYSICAL EXAMINATION
PHYSICAL EXAM:  GENERAL: non-toxic appearing; in no respiratory distress  HEENT: Atraumatic, Normocephalic, PERRL, EOMs intact b/l w/out deficits, no conjunctival pallor, MMM  NECK: No JVD; FROM  CHEST/LUNG: CTAB no wheezes/rhonchi/rales  HEART: RRR no murmur/gallops/rubs  ABDOMEN: +BS, soft, NT, ND  EXTREMITIES: No LE edema, +2 radial pulses b/l, +2 DP/PT pulses b/l  MUSCULOSKELETAL: FROM of all 4 extremities  NERVOUS SYSTEM:  A&Ox3, No motor deficits or sensory deficits; CNII-XII intact; no focal neurologic deficits; gait nml  SKIN:  No new rashes

## 2021-10-18 NOTE — H&P ADULT - PROBLEM SELECTOR PLAN 3
Low salt, fat, cholesterol diet.   Continue Antihypertensive medications Low fat, cholesterol diet.   Continue Antihypertensive medications UA with "large" leukocyte esterase, trace protein, moderate blood (RBC = 6 to 11).  No leukocytosis on serum  Patient asymptomatic (no burning, itching, dysuria).  No fever  Holding off on antibiotic for now  Optimal hydration  If becomes symptomatic, febrile, would start abx  f/u urine culture (ordered)

## 2021-10-18 NOTE — PROGRESS NOTE ADULT - ASSESSMENT
Hypotonic hyponatremia POA. Na 127-->129. Baseline presumed 133 approx 88yoF w HTN, hypothyroidism, ovarian cyst, cystocele, p/w ongoing issues of dizziness in the setting of hypotonic hyponatremia POA. Na 127-->129. Baseline presumed 133 approx. Hx notable for dietary changes at home (incr salt at home as well as incr gatorade/propel; was also started on lasix by Nephrology 3 days prior to admission). Unclear if dizziness is at all related to hyponatremia.

## 2021-10-18 NOTE — CONSULT NOTE ADULT - ATTENDING COMMENTS
Chart reviewed. Events noted. Pt seen and examined. Agree with excellent note above.   89yo F with chronic, mild, asymptomatic hyponatremia  No overt volume overload  History unclear re: etiology  Takes NSAID's intermittently. Also has hypothyroidism  Urine studies ordered  TSH level  Trial of NaCl tablets as above

## 2021-10-19 ENCOUNTER — TRANSCRIPTION ENCOUNTER (OUTPATIENT)
Age: 86
End: 2021-10-19

## 2021-10-19 VITALS
OXYGEN SATURATION: 100 % | SYSTOLIC BLOOD PRESSURE: 146 MMHG | HEART RATE: 66 BPM | DIASTOLIC BLOOD PRESSURE: 81 MMHG | RESPIRATION RATE: 18 BRPM | TEMPERATURE: 98 F

## 2021-10-19 LAB
ANION GAP SERPL CALC-SCNC: 10 MMOL/L — SIGNIFICANT CHANGE UP (ref 7–14)
BUN SERPL-MCNC: 11 MG/DL — SIGNIFICANT CHANGE UP (ref 7–23)
CALCIUM SERPL-MCNC: 8.4 MG/DL — SIGNIFICANT CHANGE UP (ref 8.4–10.5)
CHLORIDE SERPL-SCNC: 100 MMOL/L — SIGNIFICANT CHANGE UP (ref 98–107)
CO2 SERPL-SCNC: 23 MMOL/L — SIGNIFICANT CHANGE UP (ref 22–31)
COVID-19 SPIKE DOMAIN AB INTERP: POSITIVE
COVID-19 SPIKE DOMAIN ANTIBODY RESULT: >250 U/ML — HIGH
CREAT SERPL-MCNC: 0.7 MG/DL — SIGNIFICANT CHANGE UP (ref 0.5–1.3)
CULTURE RESULTS: SIGNIFICANT CHANGE UP
GLUCOSE SERPL-MCNC: 85 MG/DL — SIGNIFICANT CHANGE UP (ref 70–99)
HCT VFR BLD CALC: 37.8 % — SIGNIFICANT CHANGE UP (ref 34.5–45)
HGB BLD-MCNC: 13 G/DL — SIGNIFICANT CHANGE UP (ref 11.5–15.5)
MAGNESIUM SERPL-MCNC: 2.3 MG/DL — SIGNIFICANT CHANGE UP (ref 1.6–2.6)
MCHC RBC-ENTMCNC: 31.8 PG — SIGNIFICANT CHANGE UP (ref 27–34)
MCHC RBC-ENTMCNC: 34.4 GM/DL — SIGNIFICANT CHANGE UP (ref 32–36)
MCV RBC AUTO: 92.4 FL — SIGNIFICANT CHANGE UP (ref 80–100)
NRBC # BLD: 0 /100 WBCS — SIGNIFICANT CHANGE UP
NRBC # FLD: 0 K/UL — SIGNIFICANT CHANGE UP
PHOSPHATE SERPL-MCNC: 2.7 MG/DL — SIGNIFICANT CHANGE UP (ref 2.5–4.5)
PLATELET # BLD AUTO: 230 K/UL — SIGNIFICANT CHANGE UP (ref 150–400)
POTASSIUM SERPL-MCNC: 3.8 MMOL/L — SIGNIFICANT CHANGE UP (ref 3.5–5.3)
POTASSIUM SERPL-SCNC: 3.8 MMOL/L — SIGNIFICANT CHANGE UP (ref 3.5–5.3)
RBC # BLD: 4.09 M/UL — SIGNIFICANT CHANGE UP (ref 3.8–5.2)
RBC # FLD: 13.4 % — SIGNIFICANT CHANGE UP (ref 10.3–14.5)
SARS-COV-2 IGG+IGM SERPL QL IA: >250 U/ML — HIGH
SARS-COV-2 IGG+IGM SERPL QL IA: POSITIVE
SODIUM SERPL-SCNC: 133 MMOL/L — LOW (ref 135–145)
SPECIMEN SOURCE: SIGNIFICANT CHANGE UP
T4 FREE SERPL-MCNC: 1 NG/DL — SIGNIFICANT CHANGE UP (ref 0.9–1.8)
WBC # BLD: 4.39 K/UL — SIGNIFICANT CHANGE UP (ref 3.8–10.5)
WBC # FLD AUTO: 4.39 K/UL — SIGNIFICANT CHANGE UP (ref 3.8–10.5)

## 2021-10-19 PROCEDURE — 99239 HOSP IP/OBS DSCHRG MGMT >30: CPT

## 2021-10-19 RX ORDER — SODIUM CHLORIDE 9 MG/ML
2 INJECTION INTRAMUSCULAR; INTRAVENOUS; SUBCUTANEOUS
Qty: 60 | Refills: 0
Start: 2021-10-19 | End: 2021-11-17

## 2021-10-19 RX ORDER — FUROSEMIDE 40 MG
1 TABLET ORAL
Qty: 0 | Refills: 0 | DISCHARGE

## 2021-10-19 RX ORDER — SODIUM CHLORIDE 9 MG/ML
2 INJECTION INTRAMUSCULAR; INTRAVENOUS; SUBCUTANEOUS
Qty: 0 | Refills: 0 | DISCHARGE
Start: 2021-10-19

## 2021-10-19 RX ORDER — SODIUM CHLORIDE 9 MG/ML
1 INJECTION INTRAMUSCULAR; INTRAVENOUS; SUBCUTANEOUS
Qty: 0 | Refills: 0 | DISCHARGE
Start: 2021-10-19 | End: 2021-11-17

## 2021-10-19 RX ORDER — SODIUM CHLORIDE 9 MG/ML
2 INJECTION INTRAMUSCULAR; INTRAVENOUS; SUBCUTANEOUS DAILY
Refills: 0 | Status: DISCONTINUED | OUTPATIENT
Start: 2021-10-19 | End: 2021-10-19

## 2021-10-19 RX ADMIN — AMLODIPINE BESYLATE 5 MILLIGRAM(S): 2.5 TABLET ORAL at 10:47

## 2021-10-19 RX ADMIN — Medication 25 MILLIGRAM(S): at 10:47

## 2021-10-19 RX ADMIN — Medication 37.5 MICROGRAM(S): at 09:18

## 2021-10-19 RX ADMIN — POLYETHYLENE GLYCOL 3350 17 GRAM(S): 17 POWDER, FOR SOLUTION ORAL at 12:52

## 2021-10-19 RX ADMIN — Medication 1000 UNIT(S): at 12:52

## 2021-10-19 RX ADMIN — SODIUM CHLORIDE 2 GRAM(S): 9 INJECTION INTRAMUSCULAR; INTRAVENOUS; SUBCUTANEOUS at 12:53

## 2021-10-19 NOTE — PROGRESS NOTE ADULT - ASSESSMENT
88yoF w HTN, hypothyroidism, ovarian cyst, cystocele, p/w ongoing issues of dizziness in the setting of hypotonic hyponatremia POA. Na 127-->129. Baseline presumed 133 approx. Hx notable for dietary changes at home (incr salt at home as well as incr gatorade/propel; was also started on lasix by Nephrology 3 days prior to admission). Unclear if dizziness is at all related to hyponatremia.

## 2021-10-19 NOTE — PROVIDER CONTACT NOTE (OTHER) - RECOMMENDATIONS
Monitor HR throughout shift
Continue to assess mental status of patient and education of medications and importance of lab draws.
Educate patient on importance of lab draws and reassess readiness

## 2021-10-19 NOTE — PROVIDER CONTACT NOTE (OTHER) - ASSESSMENT
Patient is A&0 x4 and stating that she does not want blood draw because it hurts too much.
Patient A&O x4 and refusing morning meds until after breakfast. Patient is also refusing AM labs until she speaks with her son to discuss medications decisions. Patient educated on necessary medications and importance of them.
Patient is asymptomatic and stable. Patient goes bradycardic while sleeping

## 2021-10-19 NOTE — PROGRESS NOTE ADULT - PROBLEM SELECTOR PLAN 2
#Hypoosmolar hyponatremia, chronic, asymptomatic  Sodium = 127 (133 on 09/19/2021)--> 129--> 133.   Serum osmolality = 266. Urine Na 131.   -Regular diet (without sodium restriction)  -Nephro recommends 2g TID salt tabs and fluid restrict to 1L daily-- pt does not like this regimen. Will adjust Na tabs to 2g daily, and liberalize diet on discharge. D/w son. Needs follow up BMP in 1 week w Nephrology to reevaluate. Hold lasix at discharge to be discussed with outpatient Nephrology. #Hypoosmolar hyponatremia, chronic, asymptomatic  Sodium = 127 (133 on 09/19/2021)--> 129--> 133.   Serum osmolality = 266. Urine Na 131.   -Regular diet (without sodium restriction)  -Nephro recommends 2g TID salt tabs and fluid restrict to 1L daily while hospitalized-- pt does not like this regimen. Will adjust Na tabs to 2g daily, and liberalize diet on discharge. D/w son-- instructed him to not push fluids. Disc the gatorade and propel for now. Will not be prescriptive about the exact amount of fluid she should be drinking daily as this can dramatically impact quality of life and stress relationship between patient and caregiver. He states she does not drink much fluid at baseline anyways.   - Needs follow up BMP in 1 week w Nephrology to reevaluate. Hold lasix at discharge to be discussed with outpatient Nephrology.

## 2021-10-19 NOTE — DISCHARGE NOTE NURSING/CASE MANAGEMENT/SOCIAL WORK - NSDCVIVACCINE_GEN_ALL_CORE_FT
Tdap; 22-Dec-2015 08:25; Manish Gordillo (ELSY); [employer]; [id number]; IntraMuscular; Deltoid Left.; 0.5 milliLiter(s); VIS (VIS Published: 09-May-2013, VIS Presented: 22-Dec-2015);

## 2021-10-19 NOTE — PROGRESS NOTE ADULT - PROBLEM SELECTOR PLAN 4
Low fat, cholesterol diet. (Due to hyponatremia, no sodium restriction at present). Uncontrolled BP.   Continue Antihypertensive medications. May need to add additional BP medications (ACEi/ARB) once Na levels are better controlled. Follow up with outpatient Nephrology. Low fat, cholesterol diet. (Due to hyponatremia, no sodium restriction at present). Uncontrolled BP.   Continue Antihypertensive medications - amlodipine 5 mg BID. Metoprolol not a home medication for her so this was discontinued. May need to add additional BP medications (ACEi/ARB) once Na levels are better controlled. Follow up with outpatient Nephrology.

## 2021-10-19 NOTE — DISCHARGE NOTE PROVIDER - NSDCMRMEDTOKEN_GEN_ALL_CORE_FT
amLODIPine 5 mg oral tablet: 1 tab(s) orally 2 times a day  furosemide 20 mg oral tablet: 1 tab(s) orally once a day  levothyroxine 25 mcg (0.025 mg) oral tablet: 1.5 tab(s) orally once a day   Metoprolol Succinate ER 25 mg oral tablet, extended release: 1 tab(s) orally once a day (Per Pharmacy: Has a prescription on file however never filled).    amLODIPine 5 mg oral tablet: 1 tab(s) orally 2 times a day  levothyroxine 25 mcg (0.025 mg) oral tablet: 1.5 tab(s) orally once a day   sodium chloride 1 g oral tablet: 2 tab(s) orally once a day

## 2021-10-19 NOTE — PROGRESS NOTE ADULT - PROBLEM SELECTOR PLAN 3
UA with "large" leukocyte esterase, trace protein, moderate blood (RBC = 6 to 11).  No leukocytosis on serum  Patient asymptomatic (no burning, itching, dysuria).  No fever. Pt placing her own pessaries at home. Does have ongoing issues with polyuria per son.   Holding off on antibiotic for now. Urine culture < 10k cfu.  If becomes symptomatic, febrile, would start abx UA with "large" leukocyte esterase, trace protein, moderate blood (RBC = 6 to 11).  No leukocytosis on serum  Patient asymptomatic (no burning, itching, dysuria).  No fever. Pt placing her own pessaries at home. Does have ongoing issues with polyuria per son.   Holding off on antibiotic for now. Urine culture < 10k cfu. Abx not indicated.

## 2021-10-19 NOTE — PROVIDER CONTACT NOTE (OTHER) - ACTION/TREATMENT ORDERED:
As per ACP Saritha, will continue to educate patient and orient patient if confused.
As per ACP Saritha, will continue to monitor HR throughout shift and notify ACP with any changes
As per ACP Saritha, will reassess readiness for lab draws. If patient is still refusing will retry for AM labs.

## 2021-10-19 NOTE — DISCHARGE NOTE NURSING/CASE MANAGEMENT/SOCIAL WORK - PATIENT PORTAL LINK FT
You can access the FollowMyHealth Patient Portal offered by [facility] by registering at the following website: [email or web address]. By joining [employer]’s FollowMyHealth portal, you will also be able to view your health information using other applications (apps) compatible with our system.

## 2021-10-19 NOTE — PROGRESS NOTE ADULT - SUBJECTIVE AND OBJECTIVE BOX
Pt anxious this morning. Concerned about changing up her medication regimen. Did not appreciate being awoken this am. Ready to go home.     Phone call to Cheng her son 11am:  Pt anxious this morning. Concerned about changing up her medication regimen. Did not appreciate being awoken this am. Ready to go home.     Phone call to Cheng her son 11am: Discussed plan and medication regimen with him. States patient already has a follow up appt with Nephrology on Thur of this week. He will also schedule her a f/u appt with PCP in 1 week to specifically discuss HTN and anxiety.  Pt anxious this morning. Concerned about changing up her medication regimen. Did not appreciate being awoken this am. Ready to go home.     Phone call to Cheng her son 11am: Discussed plan and medication regimen with him. States patient already has a follow up appt with Nephrology on Thur of this week. He will also schedule her a f/u appt with PCP in 1 week to specifically discuss HTN and anxiety.     Vital Signs Last 24 Hrs  T(C): 36.7 (19 Oct 2021 10:45), Max: 36.9 (18 Oct 2021 12:13)  T(F): 98 (19 Oct 2021 10:45), Max: 98.5 (18 Oct 2021 12:13)  HR: 66 (19 Oct 2021 10:45) (65 - 79)  BP: 146/81 (19 Oct 2021 10:45) (122/97 - 158/84)  BP(mean): --  RR: 18 (19 Oct 2021 10:45) (15 - 18)  SpO2: 100% (19 Oct 2021 10:45) (97% - 100%)    CONSTITUTIONAL: Well-groomed, in mild distress  EYES: No conjunctival or scleral injection, non-icteric; PERRLA and symmetric  ENMT: No external nasal lesions; nasal mucosa not inflamed; no LAD, thyroid not enlarged or warm  RESPIRATORY: Breathing comfortably; no dullness to percussion; lungs CTA without wheeze/rhonchi/rales  CARDIOVASCULAR: +S1S2, RRR, no M/G/R  SKIN: No rashes or ulcers noted; no subcutaneous nodules or induration palpable  NEUROLOGIC: CN II-XII grossly intact  PSYCHIATRIC: Anxiety, tearful          LABS:                        13.0   4.39  )-----------( 230      ( 19 Oct 2021 07:38 )             37.8     10-19    133<L>  |  100  |  11  ----------------------------<  85  3.8   |  23  |  0.70    Ca    8.4      19 Oct 2021 07:38  Phos  2.7     10-19  Mg     2.30     10-19    TPro  6.6  /  Alb  4.3  /  TBili  0.6  /  DBili  x   /  AST  18  /  ALT  10  /  AlkPhos  49  10-18    PT/INR - ( 18 Oct 2021 07:02 )   PT: 12.9 sec;   INR: 1.13 ratio         PTT - ( 18 Oct 2021 07:02 )  PTT:29.2 sec  CARDIAC MARKERS ( 18 Oct 2021 07:17 )  x     / x     / 112 U/L / x     / x      CARDIAC MARKERS ( 18 Oct 2021 07:02 )  x     / x     / x     / x     / 4.4 ng/mL      Urinalysis Basic - ( 17 Oct 2021 23:05 )    Color: Light Yellow / Appearance: Clear / S.015 / pH: x  Gluc: x / Ketone: Negative  / Bili: Negative / Urobili: <2 mg/dL   Blood: x / Protein: Trace / Nitrite: Negative   Leuk Esterase: Large / RBC: 6-11 /HPF / WBC 11 /HPF   Sq Epi: x / Non Sq Epi: 3 /HPF / Bacteria: Negative

## 2021-10-19 NOTE — DISCHARGE NOTE PROVIDER - NSDCCPCAREPLAN_GEN_ALL_CORE_FT
PRINCIPAL DISCHARGE DIAGNOSIS  Diagnosis: Hyponatremia  Assessment and Plan of Treatment: You were seen by nephrology, plan to discharge with salt tabs  Please Follow up with Nephrology in 1 week for repeat Level      SECONDARY DISCHARGE DIAGNOSES  Diagnosis: HTN (hypertension)  Assessment and Plan of Treatment: Low sodium and fat diet, continue anti-hypertensive medications, and follow up with primary care physician.      Diagnosis: Hypothyroidism  Assessment and Plan of Treatment: Continue with Synthroid As prescribed  Follow up with Your PMD in 1-2 weeks    Diagnosis: Dizziness  Assessment and Plan of Treatment: If Persistent, Follow up with Your PMD in 1-2 weeks

## 2021-10-19 NOTE — CHART NOTE - NSCHARTNOTEFT_GEN_A_CORE
RD was asked to visit with patient by TIFFANIE Ly at the request of Quality Administration. RD visited patient, at time of visit, RD observed completion of lunch tray, receiving Kosher meals.  Patient has no questions or concerns at this time regarding meals and/or diet order (Regular, Low fat, Fluid Restriction 1000ml).  Patient endorses a good appetite, denies chewing/swallowing difficulties, no GI distress reported i.e. nausea, vomiting, diarrhea.  RDN remains available should patient have any questions or concerns.    Emilee Medel, MS, RDN, CDN  Pager [phone]

## 2021-10-19 NOTE — DISCHARGE NOTE NURSING/CASE MANAGEMENT/SOCIAL WORK - NSSCTYPOFSERV_GEN_ALL_CORE
RN & PT visits; RN to visit 10/20/21. RN to eval for HHA. Please contact home care agency directly if you do not hear from the agency.

## 2021-10-19 NOTE — PROGRESS NOTE ADULT - PROBLEM SELECTOR PLAN 5
Continue Synthroid 37.5 mg.  TSH 4.85. fT4 pending.  F/up TSH level Continue Synthroid 37.5 mg.  TSH 4.85. fT4 pending.

## 2021-10-19 NOTE — DISCHARGE NOTE PROVIDER - CARE PROVIDER_API CALL
Eleno Hutton (MD)  Family Medicine  [address] [place] [address]  Phone: [phone]  Fax: [phone]  Follow Up Time:

## 2021-10-19 NOTE — PROGRESS NOTE ADULT - PROBLEM SELECTOR PLAN 1
Patient unable to clarify/recall specifics re dizziness.  E.g. unable to identify if occurred from sitting to standing.  Ongoing issue x 6 months per son. Unclear if dizziness is related to hypona. BP elevated (150s SBP) which could be a factor. Additionally, anxiety/depression could also be at play given isolation she has been experiencing since the start of the pandemic per son. Started furosemide ~ 2 days ago which may have decr Na level. Difficult to determine as pts son has also bene pushing her to drink more fluids at home.COVID-19 PCR negative  - orthostatics on admission were negative.  - Echo- EF 58% on 02/2021 (no other info report info available at present); f/up w/ OP provider for complete report  - Nephrology consult as below  - continues on Telemetry monitoring  - fall precautions   - ambulate w/ assistance   - f/u vitamin levels  - PT tyrone Patient unable to clarify/recall specifics re dizziness.  E.g. unable to identify if occurred from sitting to standing.  Ongoing issue x 6 months per son. Unclear if dizziness is related to hypona. BP elevated (150s SBP) which could be a factor. Additionally, anxiety/depression could also be at play given isolation she has been experiencing since the start of the pandemic per son. Started furosemide ~ 2 days ago which may have decr Na level. Difficult to determine as pts son has also bene pushing her to drink more fluids at home.COVID-19 PCR negative. Suspect a component of anxiety is driving her dizziness, but reticent to initiate treatment for EVITA while pt is hospitalized.  - orthostatics on admission were negative.  - Echo- EF 58% on 02/2021 (no other info report info available at present); f/up w/ OP provider for complete report  - Nephrology consult as below  - PCP follow up for anxiety discussion, son aware  - continues on Telemetry monitoring  - fall precautions   - ambulate w/ assistance   - PT tyrone

## 2021-10-19 NOTE — PROVIDER CONTACT NOTE (OTHER) - REASON
Patient refusing 20:00 Basic metabolic panel w/ Mg & Inorganic Phos lab draw
Patient is bradycardic while sleeping
Patient requesting amlodipine and sodium chloride to be given after breakfast and patient refusing AM lab draw

## 2021-10-19 NOTE — PROVIDER CONTACT NOTE (OTHER) - BACKGROUND
Patient admitted fir lightheadedness, dizziness, and hyponatremia
Patient admitted for lightheadedness and dizziness and found to be hyponatremic. Pt has pmh of IBS, hyponatremia, and HTN.
Patient admitted fir lightheadedness, dizziness, and hyponatremia

## 2021-10-22 LAB — VIT B1 SERPL-MCNC: 92.8 NMOL/L — SIGNIFICANT CHANGE UP (ref 66.5–200)

## 2021-10-23 LAB — VIT B12 USB SERPL-MCNC: 763 PG/ML — SIGNIFICANT CHANGE UP (ref 650–1340)

## 2021-10-25 ENCOUNTER — NON-APPOINTMENT (OUTPATIENT)
Age: 86
End: 2021-10-25

## 2021-11-11 ENCOUNTER — APPOINTMENT (OUTPATIENT)
Dept: UROGYNECOLOGY | Facility: CLINIC | Age: 86
End: 2021-11-11
Payer: MEDICARE

## 2021-11-11 DIAGNOSIS — R35.0 FREQUENCY OF MICTURITION: ICD-10-CM

## 2021-11-11 PROBLEM — E87.1 HYPO-OSMOLALITY AND HYPONATREMIA: Chronic | Status: ACTIVE | Noted: 2021-10-18

## 2021-11-11 LAB
BILIRUB UR QL STRIP: NORMAL
CLARITY UR: CLEAR
COLLECTION METHOD: NORMAL
GLUCOSE UR-MCNC: NORMAL
HCG UR QL: 0.2 EU/DL
HGB UR QL STRIP.AUTO: NORMAL
KETONES UR-MCNC: NORMAL
LEUKOCYTE ESTERASE UR QL STRIP: NORMAL
NITRITE UR QL STRIP: NORMAL
PH UR STRIP: 5.5
PROT UR STRIP-MCNC: NORMAL
SP GR UR STRIP: 1.02

## 2021-11-11 PROCEDURE — 99213 OFFICE O/P EST LOW 20 MIN: CPT | Mod: 25

## 2021-11-11 PROCEDURE — 51701 INSERT BLADDER CATHETER: CPT

## 2021-11-11 PROCEDURE — 81003 URINALYSIS AUTO W/O SCOPE: CPT | Mod: QW

## 2021-11-11 NOTE — HISTORY OF PRESENT ILLNESS
[FreeTextEntry1] : Fide 89y/o presents to the office for follow up pelvic prolapse.  Supported with Sandro SS # 2 3/4.  PT is using vaginal lubrication daily and Estradiol cream BIW.  Urinating with a mild frequency.  Denies any dysuria, fever, chills, back pain, or blood in urine.  Moving BM without problems.

## 2021-11-11 NOTE — PHYSICAL EXAM
[No Acute Distress] : in no acute distress [Well developed] : well developed [Well Nourished] : ~L well nourished [Good Hygeine] : demonstrates good hygeine [Oriented x3] : disoriented to person, place, or time [Normal Mood/Affect] : mood and affect are normal [Anxiety] : patient is anxious [Tenderness] : ~T no ~M abdominal tenderness observed [Distended] : not distended [Warm and Dry] : was warm and dry to touch [Normal Gait] : gait was normal [Vulvar Atrophy] : vulvar atrophy [Labia Majora] : were normal [Labia Minora] : were normal [Atrophy] : atrophy [Dry Mucosa] : dry mucosa [No Bleeding] : there was no active vaginal bleeding [Normal] : normal [External Hemorrhoid] : external hemorrhoid was present [de-identified] : irritation noted and mild bleed noted.  Stops with applied pressure.

## 2021-11-11 NOTE — PROCEDURE
[Straight Catheterization] : insertion of a straight catheter [Urinary Frequency] : urinary frequency [Patient] : the patient [___ Fr Straight Tip] : a [unfilled] in [place] straight tip catheter [Clear] : clear [Culture] : culture [Urinalysis] : urinalysis [No Complications] : no complications [Tolerated Well] : the patient tolerated the procedure well [Post procedure instructions and information given] : Post procedure instructions and information were given and reviewed with patient. [Good Fit] : fits well [Refit] : refit is not needed [Erosion] : no evidence of erosion [Erythema] : erythema noted [Discharge] : no vaginal discharge [Infection] : no evidence of infection [Pessary Inserted] : inserted [Pessary Washed] : washed [H2O] : H2O [None] : no bleeding [Medication Review] : Medicaiton Review: Patient verbalizes understanding of risks and benefits [de-identified] : PVR 40mL [FreeTextEntry1] : GH SS 2 3/4 [de-identified] : Mild erythema at posterior vaginal wall [FreeTextEntry3] : Replens [FreeTextEntry8] : Routine PeriCare

## 2021-11-11 NOTE — DISCUSSION/SUMMARY
[FreeTextEntry1] : # Vaginal Burning:\par - Continue use of Replens daily. Stop use of vaginal estrogen until after follow up with Dr. Harris \par - A&D ointment or Aquaphor to the vulva \par \par #POP:\par - Patient doing well with pessary GH SS 2 3/4\par - Pessary precautions and instructions reviewed, verbalized understanding\par \par #urinary frequency:\par -urine dip in office - +blood, small leuks.  Will await for results before abx treatment.\par -Formal UA and CS sent.\par \par #Hemorrhoid:\par -Apply Preparation H area daily.\par -If symptoms persist, pt to follow up with GI/colorectal.\par \par Will RTO for follow up of POP and pessary check in 3 months or sooner if needed.  Instructed pt and pt's son to call the office if any problems or concerns and she verbalized understanding.\par \par

## 2021-11-13 ENCOUNTER — NON-APPOINTMENT (OUTPATIENT)
Age: 86
End: 2021-11-13

## 2021-11-16 LAB
APPEARANCE: CLEAR
BACTERIA UR CULT: NORMAL
BACTERIA: NEGATIVE
BILIRUBIN URINE: NEGATIVE
BLOOD URINE: ABNORMAL
COLOR: YELLOW
GLUCOSE QUALITATIVE U: NEGATIVE
HYALINE CASTS: 1 /LPF
KETONES URINE: NEGATIVE
LEUKOCYTE ESTERASE URINE: ABNORMAL
MICROSCOPIC-UA: NORMAL
NITRITE URINE: NEGATIVE
PH URINE: 6
PROTEIN URINE: NORMAL
RED BLOOD CELLS URINE: 3 /HPF
SPECIFIC GRAVITY URINE: 1.02
SQUAMOUS EPITHELIAL CELLS: 4 /HPF
UROBILINOGEN URINE: NORMAL
WHITE BLOOD CELLS URINE: 38 /HPF

## 2021-11-19 ENCOUNTER — APPOINTMENT (OUTPATIENT)
Dept: UROGYNECOLOGY | Facility: CLINIC | Age: 86
End: 2021-11-19

## 2021-12-03 ENCOUNTER — APPOINTMENT (OUTPATIENT)
Dept: UROGYNECOLOGY | Facility: CLINIC | Age: 86
End: 2021-12-03
Payer: MEDICARE

## 2021-12-03 VITALS — TEMPERATURE: 97.3 F

## 2021-12-03 PROCEDURE — 99213 OFFICE O/P EST LOW 20 MIN: CPT

## 2021-12-03 NOTE — DISCUSSION/SUMMARY
[FreeTextEntry1] : # Vaginal Burning:\par - Continue use of vaginal estrogen BIW\par - Continue use of Replens daily \par - A&D ointment or Aquaphor to the vulva \par \par #POP:\par - Patient refit with pessary GH SS 2 1/2\par - Pessary precautions and instructions reviewed, verbalized understanding\par \par Will RTO for follow up of POP and pessary check in 2 months or sooner if needed.  Instructed pt and pt's son to call the office if any problems or concerns and she verbalized understanding.\par \par

## 2021-12-03 NOTE — PHYSICAL EXAM
[No Acute Distress] : in no acute distress [Well developed] : well developed [Well Nourished] : ~L well nourished [Good Hygeine] : demonstrates good hygeine [Normal Mood/Affect] : mood and affect are normal [Anxiety] : patient is anxious [Warm and Dry] : was warm and dry to touch [Normal Gait] : gait was normal [Vulvar Atrophy] : vulvar atrophy [Labia Majora] : were normal [Labia Minora] : were normal [Atrophy] : atrophy [Dry Mucosa] : dry mucosa [No Bleeding] : there was no active vaginal bleeding [Normal] : normal [External Hemorrhoid] : external hemorrhoid was present [Cystocele] : a cystocele [Uterine Prolapse] : uterine prolapse [Oriented x3] : disoriented to person, place, or time [Tenderness] : ~T no ~M abdominal tenderness observed [Distended] : not distended [de-identified] : irritation noted and mild bleed noted.  Stops with applied pressure.

## 2021-12-03 NOTE — HISTORY OF PRESENT ILLNESS
[FreeTextEntry1] : Fide 87y/o with known hx of POP managed with GH SS 2 3/4 and VVA managed with vaginal estrogen presents to the office accompanied by her son Cheng with complaints of " the pessary feels different".  States since it was like taken out and cleaned it has not felt the same. Denies any pain, pressure or vaginal bleeding.  PT is using vaginal lubrication daily and Estradiol cream BIW.  Denies any urinary complaints. States she has occasional constipation managed with MiraLAX as needed.

## 2021-12-03 NOTE — PROCEDURE
[Erythema] : erythema noted [Pessary Inserted] : inserted [Pessary Washed] : washed [H2O] : H2O [None] : no bleeding [Medication Review] : Medicaiton Review: Patient verbalizes understanding of risks and benefits [Refit] : refit needed [Discharge] : there is vaginal discharge [Good Fit] : fit is not good [Erosion] : no evidence of erosion [Infection] : no evidence of infection [FreeTextEntry1] : GH SS 2 3/4 [de-identified] : too tight [de-identified] : refit with GH SS 2 1/2 [de-identified] : Mild erythema at posterior vaginal wall [FreeTextEntry3] : Replens [FreeTextEntry8] : Routine PeriCare

## 2021-12-09 ENCOUNTER — APPOINTMENT (OUTPATIENT)
Dept: UROGYNECOLOGY | Facility: CLINIC | Age: 86
End: 2021-12-09
Payer: MEDICARE

## 2021-12-09 PROCEDURE — 99213 OFFICE O/P EST LOW 20 MIN: CPT

## 2022-02-11 ENCOUNTER — APPOINTMENT (OUTPATIENT)
Dept: UROGYNECOLOGY | Facility: CLINIC | Age: 87
End: 2022-02-11
Payer: MEDICARE

## 2022-02-11 PROCEDURE — 99213 OFFICE O/P EST LOW 20 MIN: CPT

## 2022-02-11 NOTE — DISCUSSION/SUMMARY
[FreeTextEntry1] : #POP:\par - Patient doing well with pessary GH SS 2 3/4\par - Pessary precautions and instructions reviewed, verbalized understanding\par \par # Vaginal Burning:\par - Continue use of Replens daily. Stop use of vaginal estrogen until after follow up with Dr. Harris \par - A&D ointment or Aquaphor to the vulva \par \par Will RTO for follow up of POP and pessary check in 3 months or sooner if needed.  Instructed pt and pt's son to call the office if any problems or concerns and she verbalized understanding.\par \par

## 2022-02-11 NOTE — PROCEDURE
[Good Fit] : fits well [Erythema] : erythema noted [Pessary Inserted] : inserted [Pessary Washed] : washed [H2O] : H2O [None] : no bleeding [Medication Review] : Medicaiton Review: Patient verbalizes understanding of risks and benefits [Refit] : refit is not needed [Erosion] : no evidence of erosion [Discharge] : no vaginal discharge [Infection] : no evidence of infection [FreeTextEntry1] : GH SS 2 3/4 [de-identified] : Mild erythema at posterior vaginal wall [FreeTextEntry3] : Replens [FreeTextEntry8] : Routine PeriCare

## 2022-02-11 NOTE — HISTORY OF PRESENT ILLNESS
[FreeTextEntry1] : Fide 89y/o presents to the office for follow up pelvic prolapse.  Supported with Sandro SS # 2 3/4.  PT is using vaginal lubrication daily and Estradiol cream BIW.  Urinating and Moving BM without problems.

## 2022-02-11 NOTE — PHYSICAL EXAM
[No Acute Distress] : in no acute distress [Well developed] : well developed [Well Nourished] : ~L well nourished [Good Hygeine] : demonstrates good hygeine [Normal Mood/Affect] : mood and affect are normal [Anxiety] : patient is anxious [Warm and Dry] : was warm and dry to touch [Normal Gait] : gait was normal [Vulvar Atrophy] : vulvar atrophy [Labia Majora] : were normal [Labia Minora] : were normal [Atrophy] : atrophy [Dry Mucosa] : dry mucosa [No Bleeding] : there was no active vaginal bleeding [Normal] : normal [External Hemorrhoid] : external hemorrhoid was present [Oriented x3] : disoriented to person, place, or time [Tenderness] : ~T no ~M abdominal tenderness observed [Distended] : not distended [de-identified] : irritation noted and mild bleed noted.  Stops with applied pressure.

## 2022-03-28 ENCOUNTER — APPOINTMENT (OUTPATIENT)
Dept: CARDIOLOGY | Facility: CLINIC | Age: 87
End: 2022-03-28
Payer: MEDICARE

## 2022-03-28 ENCOUNTER — NON-APPOINTMENT (OUTPATIENT)
Age: 87
End: 2022-03-28

## 2022-03-28 VITALS
HEIGHT: 62 IN | SYSTOLIC BLOOD PRESSURE: 175 MMHG | DIASTOLIC BLOOD PRESSURE: 81 MMHG | HEART RATE: 81 BPM | BODY MASS INDEX: 24.66 KG/M2 | WEIGHT: 134 LBS | OXYGEN SATURATION: 97 %

## 2022-03-28 DIAGNOSIS — R60.9 EDEMA, UNSPECIFIED: ICD-10-CM

## 2022-03-28 DIAGNOSIS — R07.9 CHEST PAIN, UNSPECIFIED: ICD-10-CM

## 2022-03-28 PROCEDURE — 99214 OFFICE O/P EST MOD 30 MIN: CPT

## 2022-03-28 PROCEDURE — 93000 ELECTROCARDIOGRAM COMPLETE: CPT

## 2022-05-10 NOTE — PROGRESS NOTE ADULT - ASSESSMENT
87 yo f pmh htn, hypothyroidism, gerd, IBS, previous episode of symptomatic hyponatremia presents with weakness , dizziness and generalized malaise.   admit for symptomatic hyponatremia     #Hyponatremia  - pt with previous episode of symptomatic hyponatremia.   - pt previously fluid restricting at home since diagnosed with hyponatremia on wednesday  - Hyponatremia resolved however quickly. DDAVP as per nephro recs.   - Bladder scan to r/o urinary retention   - Now on d5 at 75cc/hr   - f/u urine lytes/osmolality   - nephrology consulted Dr. Bernal, recs appreciated     #Dizziness/malaise   - suspect patients presenting symptoms are secondary to symptomatic hyponatremia however will evaluate for alternative etiology   - no focal deficits on exam. doubt cva   - medications reviewed   - F/u orthostatic vital signs   - TSH elevated 4.34, T3 low 68 and t4 wnl. Not likely contributing to symptoms.   - mg, phos wnl   - cardiac enzymes negative. EKG: NSR 80 bpm  - afebrile, no leukoctyosis. covid pcr neg. check rvp   - oob with assist, fall precuations     #Hypothyroidism  - TSH elevated 4.34, T3 low 68 and t4 wnl. Not likely contributing to symptoms.   - Continue home synthroid     #HTN  - bp wnl   - continue home amlodipine w/ hold parameters in place    #Prophylactic Measure   - lovenox 40 QD Primary Defect Length In Cm (Final Defect Size - Required For Flaps/Grafts): 1.3

## 2022-06-01 ENCOUNTER — APPOINTMENT (OUTPATIENT)
Dept: UROGYNECOLOGY | Facility: CLINIC | Age: 87
End: 2022-06-01
Payer: MEDICARE

## 2022-06-01 PROCEDURE — 99213 OFFICE O/P EST LOW 20 MIN: CPT

## 2022-06-01 NOTE — DISCUSSION/SUMMARY
[FreeTextEntry1] : Pelvic prolapse:\par - Continue with [place] SS# 2 3/4\par - Pessary precautions and instructions reviewed, verbalized understanding\par \par  Vaginal Burning:\par - Continue use of Replens daily.\par \par Will RTO for follow up of Pelvic prolapse in 3 months or sooner if needed.  Instructed pt and pt's son to call the office if any problems or concerns and she verbalized understanding.\par \par

## 2022-06-01 NOTE — PHYSICAL EXAM
[No Acute Distress] : in no acute distress [Well developed] : well developed [Well Nourished] : ~L well nourished [Good Hygeine] : demonstrates good hygeine [Normal Mood/Affect] : mood and affect are normal [Anxiety] : patient is anxious [Warm and Dry] : was warm and dry to touch [Normal Gait] : gait was normal [Vulvar Atrophy] : vulvar atrophy [Labia Majora] : were normal [Labia Minora] : were normal [Atrophy] : atrophy [Dry Mucosa] : dry mucosa [No Bleeding] : there was no active vaginal bleeding [Normal] : normal [External Hemorrhoid] : external hemorrhoid was present [Oriented x3] : disoriented to person, place, or time [Tenderness] : ~T no ~M abdominal tenderness observed [Distended] : not distended [de-identified] : irritation noted and mild bleed noted.  Stops with applied pressure.

## 2022-06-01 NOTE — PROCEDURE
[Good Fit] : fits well [Erythema] : erythema noted [Pessary Inserted] : inserted [Pessary Washed] : washed [H2O] : H2O [None] : no bleeding [Medication Review] : Medicaiton Review: Patient verbalizes understanding of risks and benefits [Refit] : refit is not needed [Erosion] : no evidence of erosion [Discharge] : no vaginal discharge [Infection] : no evidence of infection [FreeTextEntry1] : GH SS 2 3/4 [de-identified] : Mild erythema at posterior vaginal wall [FreeTextEntry3] : Replens [FreeTextEntry8] : Routine PeriCare

## 2022-06-24 ENCOUNTER — APPOINTMENT (OUTPATIENT)
Dept: UROGYNECOLOGY | Facility: CLINIC | Age: 87
End: 2022-06-24
Payer: MEDICARE

## 2022-06-24 LAB
BILIRUB UR QL STRIP: NEGATIVE
CLARITY UR: CLEAR
COLLECTION METHOD: NORMAL
GLUCOSE UR-MCNC: NEGATIVE
HCG UR QL: 0.2 EU/DL
HGB UR QL STRIP.AUTO: NORMAL
KETONES UR-MCNC: NEGATIVE
LEUKOCYTE ESTERASE UR QL STRIP: NEGATIVE
NITRITE UR QL STRIP: NEGATIVE
PH UR STRIP: 7
PROT UR STRIP-MCNC: NORMAL
SP GR UR STRIP: 1.02

## 2022-06-24 PROCEDURE — 99214 OFFICE O/P EST MOD 30 MIN: CPT

## 2022-06-24 PROCEDURE — 81003 URINALYSIS AUTO W/O SCOPE: CPT | Mod: QW

## 2022-06-24 NOTE — DISCUSSION/SUMMARY
[FreeTextEntry1] : Vaginal irritation/atrophy:\par -Reviewed pericare\par -Advised pt to continue use of Vaginal estrogen and OTC vaginal lubrication, reviewed instructions on use\par \par \par In office urine dip shows moderate blood and trace protein, unchanged however pt insisted on UC being sent out.\par \par Patient has a follow up on 9/1/22, but will RTO sooner if needed.\par Advised her to call office with any questions or concerns, pt verbalized understanding.

## 2022-06-24 NOTE — PHYSICAL EXAM
[No Acute Distress] : in no acute distress [Well developed] : well developed [Well Nourished] : ~L well nourished [Good Hygeine] : demonstrates good hygeine [Oriented x3] : oriented to person, place, and time [Normal Mood/Affect] : mood and affect are normal [Anxiety] : patient is anxious [None] : no CVA tenderness [Warm and Dry] : was warm and dry to touch [Normal Gait] : gait was normal [Vulvar Atrophy] : vulvar atrophy [Labia Majora] : were normal [Atrophy] : atrophy [Dry Mucosa] : dry mucosa [No Bleeding] : there was no active vaginal bleeding [Tenderness] : ~T no ~M abdominal tenderness observed [Distended] : not distended

## 2022-06-24 NOTE — HISTORY OF PRESENT ILLNESS
[FreeTextEntry1] : Fide is an 88 y/o with a hx of POP managed with GH SS 2 1/2. She also has hx of vulvovaginal atrophy which is  being treated with vaginal estrogen and OTC vaginal lubrication. She presents today with c/o vaginal irritation. She denies any new urinary symptoms. She denies any vaginal bleeding or discharge.

## 2022-07-01 ENCOUNTER — NON-APPOINTMENT (OUTPATIENT)
Age: 87
End: 2022-07-01

## 2022-07-20 NOTE — PHYSICAL THERAPY INITIAL EVALUATION ADULT - ASR WT BEARING STATUS EVAL
no weight-bearing restrictions Secondary Intention Text (Leave Blank If You Do Not Want): The defect will heal with secondary intention.

## 2022-08-02 ENCOUNTER — APPOINTMENT (OUTPATIENT)
Dept: UROGYNECOLOGY | Facility: CLINIC | Age: 87
End: 2022-08-02

## 2022-08-02 VITALS — DIASTOLIC BLOOD PRESSURE: 80 MMHG | SYSTOLIC BLOOD PRESSURE: 135 MMHG | TEMPERATURE: 97.3 F

## 2022-08-02 DIAGNOSIS — B37.3 CANDIDIASIS OF VULVA AND VAGINA: ICD-10-CM

## 2022-08-02 PROCEDURE — 99214 OFFICE O/P EST MOD 30 MIN: CPT

## 2022-08-02 NOTE — HISTORY OF PRESENT ILLNESS
[FreeTextEntry1] : Fide is a 89 year old with hx of POP and vaginal atrophy here for follow-up.  Her POP is managed with a Gellhorn SS 2 3/4, last exchanged 6/1/2022.  Her vaginal atrophy is treated with vaginal estrogen BIW and replens every day.  She reports daily vaginal burning and pain , but she denies any urinary symptoms, vaginal bleeding, or discharge.  Patient's son reports that she complains of daily vaginal burning for 3 years, making it difficult for her to walk.  He brought lab reports with him today from Dr. Fredi Tena from 7/25/2022 suggestive of a yeast infection.

## 2022-08-02 NOTE — PHYSICAL EXAM
[Chaperone Present] : A chaperone was present in the examining room during all aspects of the physical examination [No Acute Distress] : in no acute distress [Well developed] : well developed [Well Nourished] : ~L well nourished [Respirations regular] : ~T respiratory rate was regular [Normal Appearance] : ~T the appearance of the neck was normal [Warm and Dry] : was warm and dry to touch [Normal Gait] : gait was normal [Labia Majora] : were normal [Labia Minora] : were normal [Normal] : was normal [Atrophy] : atrophy [Normal Memory] : ~T memory was ~L impaired [Anxiety] : patient is anxious [Tenderness] : ~T no ~M abdominal tenderness observed [Distended] : not distended

## 2022-08-02 NOTE — PROCEDURE
[Follow Up] : follow up [Discharge] : there is vaginal discharge [Pessary Out] : removed [None] : no bleeding [Erosion] : no evidence of erosion [Erythema] : no erythema [FreeTextEntry1] : Sandro 2 3/4

## 2022-08-02 NOTE — DISCUSSION/SUMMARY
[FreeTextEntry1] : 1) Pelvic organ prolapse: patient tolerated exchange well/pessary removed 2/2 yeast infection\par -Pessary removed, Treat with terconazole x  3days\par -Gellhorn SS 2 3/4 (or possible ring pessary) to be re-inserted following completion of yeast infection treatment\par -Pessary precautions reviewed with patient.\par \par 2. Persistent vaginal pain, burning:\par -Will reassess after [place]ian treated and also with pelvic rest. We discussed that if pessary causing irritation we can choose to leave out in future or refit. We discussed possible ring pessary or smaller gellhorn\par \par 3) Vulvovaginal atrophy\par -Continue vaginal estrogen twice weekly and replens daily, resume once yeast infection treated\par \par Reassess symptoms in 2 weeks without pessary in place.  At that time, we will determine if pessary should be reinserted or not. Patient and son verbalized understanding. All questions answered.

## 2022-08-02 NOTE — REASON FOR VISIT
[Follow-Up Visit_____] : a follow-up visit for [unfilled] [Pelvic Organ Prolapse] : pelvic organ prolapse [Family Member] : family member [Other: _____] : [unfilled]

## 2022-08-17 ENCOUNTER — APPOINTMENT (OUTPATIENT)
Dept: UROGYNECOLOGY | Facility: CLINIC | Age: 87
End: 2022-08-17

## 2022-08-17 PROCEDURE — 99214 OFFICE O/P EST MOD 30 MIN: CPT

## 2022-08-18 NOTE — PHYSICAL EXAM
[Chaperone Present] : A chaperone was present in the examining room during all aspects of the physical examination [Labia Majora] : were normal [Labia Minora] : were normal [Normal Appearance] : general appearance was normal [Atrophy] : atrophy [Normal] : no abnormalities [Exam Deferred] : was deferred [FreeTextEntry1] : General: Well, appearing. Alert and orientated. No acute distress\par HEENT: Normocephalic, atraumatic and extraocular muscles appear to be intact \par Neck: Full range of motion, no obvious lymphadenopathy, deformities, or masses noted \par Respiratory: Speaking in full sentences comfortably, normal work of breathing and no cough during visit\par Musculoskeletal: active full range of motion in extremities \par Extremities: No upper extremity edema noted\par Skin: no obvious rash or skin lesions\par Neuro: Orientated X 3, speech is fluent, normal rate\par Psych: Normal mood and affect\par

## 2022-08-18 NOTE — DISCUSSION/SUMMARY
[FreeTextEntry1] : Fide is a 89 yo who presents today for follow up, with her son, on prolapse and vaginal atrophy. Patient refit today with RS#3. In regards to her vaginal atrophy, patient was prescribed e-string. Discussed that when patient returns in 2 weeks for pessary follow up, then at that time the E-string will be placed. Discussed bringing this back with them to appt for placement of this. Discussed in the interim, patient should continue vaginal estrogen 2x/weekly and Replens. All questions answered. Pt instructed to call office if any further issues arise. RTO in 2 weeks or sooner if issues arise.

## 2022-08-18 NOTE — PROCEDURE
[Good Fit] : fits well [Pessary Inserted] : inserted [None] : no bleeding [Refit] : refit is not needed [Erosion] : no evidence of erosion [Erythema] : no erythema [Discharge] : no vaginal discharge [Infection] : no evidence of infection [FreeTextEntry1] : RS#3 [FreeTextEntry8] : Pessary precautions and instructions discussed.

## 2022-08-18 NOTE — HISTORY OF PRESENT ILLNESS
[FreeTextEntry1] : Fide is a 89 yo, who presents with her son today, who presents today for follow up on prolapse. She has been managed with a Gelhorn SS 2 3/4. She had pessary removed 08/02/2022 due to active yeast infection. She was prescribed Terconazole x 3 days which she states that she completed the course. She restarted on vaginal estrogen after course of Terconazole, however, regularly forgets how much to use and how often to use. Reports periodic bothersome vaginal bulge symptoms.

## 2022-08-20 ENCOUNTER — NON-APPOINTMENT (OUTPATIENT)
Age: 87
End: 2022-08-20

## 2022-09-08 ENCOUNTER — APPOINTMENT (OUTPATIENT)
Dept: UROGYNECOLOGY | Facility: CLINIC | Age: 87
End: 2022-09-08

## 2022-09-08 VITALS — SYSTOLIC BLOOD PRESSURE: 170 MMHG | DIASTOLIC BLOOD PRESSURE: 80 MMHG | TEMPERATURE: 96.8 F

## 2022-09-08 PROCEDURE — 99213 OFFICE O/P EST LOW 20 MIN: CPT

## 2022-09-08 NOTE — HISTORY OF PRESENT ILLNESS
[FreeTextEntry1] : Fide is a 89 yo who presents with her son today for follow up of pelvic organ prolapse managed by RS#3.She denies pelvic pain, vaginal bleeding.  She is urinating well, denies bowel issues  She is also on vaginal estrogen, but due to difficulty in inserting applicator she have Estring inserted today.

## 2022-09-08 NOTE — DISCUSSION/SUMMARY
[FreeTextEntry1] : #Pelvic organ prolapse \par -Continue with RS #3 \par -Bleeding precautions reviewed \par -Instructions reviewed with patient and her son \par \par #Vaginal atrophy \par -Estring placement\par -Advised that she may continue with Replens \par -Risks of estrogen reviewed with patient and her son including breast and or endometrial cancer, they verbalized understanding and agrees\par \par Followup in 3 months for pessary care and Estring

## 2022-09-08 NOTE — PROCEDURE
[Good Fit] : fits well [Pessary Inserted] : inserted [None] : no bleeding [New] : new [Refit] : refit is not needed [Erosion] : no evidence of erosion [Erythema] : no erythema [Discharge] : no vaginal discharge [Infection] : no evidence of infection [Pessary Washed] : washed [FreeTextEntry1] : RS#3 [FreeTextEntry8] : Pessary precautions and instructions discussed.

## 2022-09-08 NOTE — PHYSICAL EXAM
[Labia Majora] : were normal [Labia Minora] : were normal [Normal Appearance] : general appearance was normal [Atrophy] : atrophy [Normal] : normal [FreeTextEntry1] : \par  [No Acute Distress] : in no acute distress [Well developed] : well developed [Well Nourished] : ~L well nourished [Oriented x3] : oriented to person, place, and time [No Bleeding] : there was no active vaginal bleeding

## 2022-09-28 ENCOUNTER — APPOINTMENT (OUTPATIENT)
Dept: CARDIOLOGY | Facility: CLINIC | Age: 87
End: 2022-09-28

## 2022-10-28 ENCOUNTER — INPATIENT (INPATIENT)
Facility: HOSPITAL | Age: 87
LOS: 3 days | Discharge: HOME HEALTH | End: 2022-11-01
Attending: INTERNAL MEDICINE | Admitting: INTERNAL MEDICINE

## 2022-10-28 VITALS
OXYGEN SATURATION: 99 % | SYSTOLIC BLOOD PRESSURE: 151 MMHG | TEMPERATURE: 98 F | DIASTOLIC BLOOD PRESSURE: 77 MMHG | HEART RATE: 86 BPM | RESPIRATION RATE: 16 BRPM | HEIGHT: 62 IN

## 2022-10-28 DIAGNOSIS — N39.0 URINARY TRACT INFECTION, SITE NOT SPECIFIED: ICD-10-CM

## 2022-10-28 DIAGNOSIS — R55 SYNCOPE AND COLLAPSE: ICD-10-CM

## 2022-10-28 DIAGNOSIS — Z98.89 OTHER SPECIFIED POSTPROCEDURAL STATES: Chronic | ICD-10-CM

## 2022-10-28 DIAGNOSIS — E87.1 HYPO-OSMOLALITY AND HYPONATREMIA: ICD-10-CM

## 2022-10-28 DIAGNOSIS — I10 ESSENTIAL (PRIMARY) HYPERTENSION: ICD-10-CM

## 2022-10-28 DIAGNOSIS — E03.9 HYPOTHYROIDISM, UNSPECIFIED: ICD-10-CM

## 2022-10-28 LAB
ALBUMIN SERPL ELPH-MCNC: 4.2 G/DL — SIGNIFICANT CHANGE UP (ref 3.3–5)
ALP SERPL-CCNC: 68 U/L — SIGNIFICANT CHANGE UP (ref 40–120)
ALT FLD-CCNC: 12 U/L — SIGNIFICANT CHANGE UP (ref 4–33)
ANION GAP SERPL CALC-SCNC: 10 MMOL/L — SIGNIFICANT CHANGE UP (ref 7–14)
APPEARANCE UR: ABNORMAL
APTT BLD: 28.4 SEC — SIGNIFICANT CHANGE UP (ref 27–36.3)
AST SERPL-CCNC: 22 U/L — SIGNIFICANT CHANGE UP (ref 4–32)
BACTERIA # UR AUTO: ABNORMAL
BASE EXCESS BLDV CALC-SCNC: 0.6 MMOL/L — SIGNIFICANT CHANGE UP (ref -2–3)
BASOPHILS # BLD AUTO: 0.03 K/UL — SIGNIFICANT CHANGE UP (ref 0–0.2)
BASOPHILS NFR BLD AUTO: 0.4 % — SIGNIFICANT CHANGE UP (ref 0–2)
BILIRUB SERPL-MCNC: 0.8 MG/DL — SIGNIFICANT CHANGE UP (ref 0.2–1.2)
BILIRUB UR-MCNC: NEGATIVE — SIGNIFICANT CHANGE UP
BLOOD GAS VENOUS COMPREHENSIVE RESULT: SIGNIFICANT CHANGE UP
BUN SERPL-MCNC: 15 MG/DL — SIGNIFICANT CHANGE UP (ref 7–23)
CALCIUM SERPL-MCNC: 9.2 MG/DL — SIGNIFICANT CHANGE UP (ref 8.4–10.5)
CHLORIDE BLDV-SCNC: 94 MMOL/L — LOW (ref 96–108)
CHLORIDE SERPL-SCNC: 93 MMOL/L — LOW (ref 98–107)
CO2 BLDV-SCNC: 27.4 MMOL/L — HIGH (ref 22–26)
CO2 SERPL-SCNC: 25 MMOL/L — SIGNIFICANT CHANGE UP (ref 22–31)
COLOR SPEC: SIGNIFICANT CHANGE UP
CREAT SERPL-MCNC: 0.68 MG/DL — SIGNIFICANT CHANGE UP (ref 0.5–1.3)
DIFF PNL FLD: ABNORMAL
EGFR: 83 ML/MIN/1.73M2 — SIGNIFICANT CHANGE UP
EOSINOPHIL # BLD AUTO: 0.01 K/UL — SIGNIFICANT CHANGE UP (ref 0–0.5)
EOSINOPHIL NFR BLD AUTO: 0.1 % — SIGNIFICANT CHANGE UP (ref 0–6)
EPI CELLS # UR: 1 /HPF — SIGNIFICANT CHANGE UP (ref 0–5)
FLUAV AG NPH QL: SIGNIFICANT CHANGE UP
FLUBV AG NPH QL: SIGNIFICANT CHANGE UP
GAS PNL BLDV: 126 MMOL/L — LOW (ref 136–145)
GAS PNL BLDV: SIGNIFICANT CHANGE UP
GLUCOSE BLDV-MCNC: 91 MG/DL — SIGNIFICANT CHANGE UP (ref 70–99)
GLUCOSE SERPL-MCNC: 102 MG/DL — HIGH (ref 70–99)
GLUCOSE UR QL: NEGATIVE — SIGNIFICANT CHANGE UP
HCO3 BLDV-SCNC: 26 MMOL/L — SIGNIFICANT CHANGE UP (ref 22–29)
HCT VFR BLD CALC: 35.2 % — SIGNIFICANT CHANGE UP (ref 34.5–45)
HCT VFR BLDA CALC: 37 % — SIGNIFICANT CHANGE UP (ref 34.5–46.5)
HGB BLD CALC-MCNC: 12.3 G/DL — SIGNIFICANT CHANGE UP (ref 11.5–15.5)
HGB BLD-MCNC: 12.1 G/DL — SIGNIFICANT CHANGE UP (ref 11.5–15.5)
HYALINE CASTS # UR AUTO: 2 /LPF — SIGNIFICANT CHANGE UP (ref 0–7)
IANC: 5.63 K/UL — SIGNIFICANT CHANGE UP (ref 1.8–7.4)
IMM GRANULOCYTES NFR BLD AUTO: 0.6 % — SIGNIFICANT CHANGE UP (ref 0–0.9)
INR BLD: 1.12 RATIO — SIGNIFICANT CHANGE UP (ref 0.88–1.16)
KETONES UR-MCNC: NEGATIVE — SIGNIFICANT CHANGE UP
LACTATE BLDV-MCNC: 1.1 MMOL/L — SIGNIFICANT CHANGE UP (ref 0.5–2)
LEUKOCYTE ESTERASE UR-ACNC: ABNORMAL
LYMPHOCYTES # BLD AUTO: 0.97 K/UL — LOW (ref 1–3.3)
LYMPHOCYTES # BLD AUTO: 13.3 % — SIGNIFICANT CHANGE UP (ref 13–44)
MAGNESIUM SERPL-MCNC: 1.9 MG/DL — SIGNIFICANT CHANGE UP (ref 1.6–2.6)
MCHC RBC-ENTMCNC: 31 PG — SIGNIFICANT CHANGE UP (ref 27–34)
MCHC RBC-ENTMCNC: 34.4 GM/DL — SIGNIFICANT CHANGE UP (ref 32–36)
MCV RBC AUTO: 90.3 FL — SIGNIFICANT CHANGE UP (ref 80–100)
MONOCYTES # BLD AUTO: 0.59 K/UL — SIGNIFICANT CHANGE UP (ref 0–0.9)
MONOCYTES NFR BLD AUTO: 8.1 % — SIGNIFICANT CHANGE UP (ref 2–14)
NEUTROPHILS # BLD AUTO: 5.63 K/UL — SIGNIFICANT CHANGE UP (ref 1.8–7.4)
NEUTROPHILS NFR BLD AUTO: 77.5 % — HIGH (ref 43–77)
NITRITE UR-MCNC: POSITIVE
NRBC # BLD: 0 /100 WBCS — SIGNIFICANT CHANGE UP (ref 0–0)
NRBC # FLD: 0 K/UL — SIGNIFICANT CHANGE UP (ref 0–0)
NT-PROBNP SERPL-SCNC: 570 PG/ML — HIGH
OSMOLALITY UR: 374 MOSM/KG — SIGNIFICANT CHANGE UP (ref 50–1200)
PCO2 BLDV: 44 MMHG — HIGH (ref 39–42)
PH BLDV: 7.38 — SIGNIFICANT CHANGE UP (ref 7.32–7.43)
PH UR: 6.5 — SIGNIFICANT CHANGE UP (ref 5–8)
PLATELET # BLD AUTO: 245 K/UL — SIGNIFICANT CHANGE UP (ref 150–400)
PO2 BLDV: 32 MMHG — SIGNIFICANT CHANGE UP
POTASSIUM BLDV-SCNC: 4.1 MMOL/L — SIGNIFICANT CHANGE UP (ref 3.5–5.1)
POTASSIUM SERPL-MCNC: 4.3 MMOL/L — SIGNIFICANT CHANGE UP (ref 3.5–5.3)
POTASSIUM SERPL-SCNC: 4.3 MMOL/L — SIGNIFICANT CHANGE UP (ref 3.5–5.3)
PROT SERPL-MCNC: 6.8 G/DL — SIGNIFICANT CHANGE UP (ref 6–8.3)
PROT UR-MCNC: ABNORMAL
PROTHROM AB SERPL-ACNC: 13 SEC — SIGNIFICANT CHANGE UP (ref 10.5–13.4)
RBC # BLD: 3.9 M/UL — SIGNIFICANT CHANGE UP (ref 3.8–5.2)
RBC # FLD: 13.2 % — SIGNIFICANT CHANGE UP (ref 10.3–14.5)
RBC CASTS # UR COMP ASSIST: 35 /HPF — HIGH (ref 0–4)
RSV RNA NPH QL NAA+NON-PROBE: SIGNIFICANT CHANGE UP
SAO2 % BLDV: 55.3 % — SIGNIFICANT CHANGE UP
SARS-COV-2 RNA SPEC QL NAA+PROBE: SIGNIFICANT CHANGE UP
SODIUM SERPL-SCNC: 128 MMOL/L — LOW (ref 135–145)
SODIUM UR-SCNC: 47 MMOL/L — SIGNIFICANT CHANGE UP
SP GR SPEC: 1.01 — SIGNIFICANT CHANGE UP (ref 1.01–1.05)
TROPONIN T, HIGH SENSITIVITY RESULT: 24 NG/L — SIGNIFICANT CHANGE UP
TROPONIN T, HIGH SENSITIVITY RESULT: 28 NG/L — SIGNIFICANT CHANGE UP
TSH SERPL-MCNC: 5.44 UIU/ML — HIGH (ref 0.27–4.2)
UROBILINOGEN FLD QL: SIGNIFICANT CHANGE UP
WBC # BLD: 7.27 K/UL — SIGNIFICANT CHANGE UP (ref 3.8–10.5)
WBC # FLD AUTO: 7.27 K/UL — SIGNIFICANT CHANGE UP (ref 3.8–10.5)
WBC UR QL: 120 /HPF — HIGH (ref 0–5)

## 2022-10-28 PROCEDURE — 70450 CT HEAD/BRAIN W/O DYE: CPT | Mod: 26,MB

## 2022-10-28 PROCEDURE — 99223 1ST HOSP IP/OBS HIGH 75: CPT

## 2022-10-28 PROCEDURE — 99285 EMERGENCY DEPT VISIT HI MDM: CPT

## 2022-10-28 PROCEDURE — 73502 X-RAY EXAM HIP UNI 2-3 VIEWS: CPT | Mod: 26,RT

## 2022-10-28 PROCEDURE — 73560 X-RAY EXAM OF KNEE 1 OR 2: CPT | Mod: 26,RT

## 2022-10-28 RX ORDER — LOSARTAN POTASSIUM 100 MG/1
25 TABLET, FILM COATED ORAL ONCE
Refills: 0 | Status: COMPLETED | OUTPATIENT
Start: 2022-10-28 | End: 2022-10-28

## 2022-10-28 RX ORDER — AMLODIPINE BESYLATE 2.5 MG/1
1 TABLET ORAL
Qty: 0 | Refills: 0 | DISCHARGE

## 2022-10-28 RX ORDER — CEFTRIAXONE 500 MG/1
1000 INJECTION, POWDER, FOR SOLUTION INTRAMUSCULAR; INTRAVENOUS ONCE
Refills: 0 | Status: COMPLETED | OUTPATIENT
Start: 2022-10-28 | End: 2022-10-28

## 2022-10-28 RX ORDER — ENOXAPARIN SODIUM 100 MG/ML
40 INJECTION SUBCUTANEOUS EVERY 24 HOURS
Refills: 0 | Status: DISCONTINUED | OUTPATIENT
Start: 2022-10-28 | End: 2022-11-01

## 2022-10-28 RX ORDER — AMLODIPINE BESYLATE 2.5 MG/1
5 TABLET ORAL
Refills: 0 | Status: DISCONTINUED | OUTPATIENT
Start: 2022-10-29 | End: 2022-11-01

## 2022-10-28 RX ORDER — LANOLIN ALCOHOL/MO/W.PET/CERES
3 CREAM (GRAM) TOPICAL AT BEDTIME
Refills: 0 | Status: DISCONTINUED | OUTPATIENT
Start: 2022-10-28 | End: 2022-10-31

## 2022-10-28 RX ORDER — SODIUM CHLORIDE 9 MG/ML
500 INJECTION INTRAMUSCULAR; INTRAVENOUS; SUBCUTANEOUS ONCE
Refills: 0 | Status: COMPLETED | OUTPATIENT
Start: 2022-10-28 | End: 2022-10-28

## 2022-10-28 RX ORDER — CEFTRIAXONE 500 MG/1
1000 INJECTION, POWDER, FOR SOLUTION INTRAMUSCULAR; INTRAVENOUS EVERY 24 HOURS
Refills: 0 | Status: COMPLETED | OUTPATIENT
Start: 2022-10-28 | End: 2022-10-31

## 2022-10-28 RX ORDER — SODIUM CHLORIDE 9 MG/ML
1 INJECTION INTRAMUSCULAR; INTRAVENOUS; SUBCUTANEOUS THREE TIMES A DAY
Refills: 0 | Status: DISCONTINUED | OUTPATIENT
Start: 2022-10-28 | End: 2022-11-01

## 2022-10-28 RX ORDER — ONDANSETRON 8 MG/1
4 TABLET, FILM COATED ORAL EVERY 8 HOURS
Refills: 0 | Status: DISCONTINUED | OUTPATIENT
Start: 2022-10-28 | End: 2022-11-01

## 2022-10-28 RX ORDER — ACETAMINOPHEN 500 MG
650 TABLET ORAL EVERY 6 HOURS
Refills: 0 | Status: DISCONTINUED | OUTPATIENT
Start: 2022-10-28 | End: 2022-11-01

## 2022-10-28 RX ORDER — LEVOTHYROXINE SODIUM 125 MCG
50 TABLET ORAL DAILY
Refills: 0 | Status: DISCONTINUED | OUTPATIENT
Start: 2022-10-28 | End: 2022-11-01

## 2022-10-28 RX ADMIN — SODIUM CHLORIDE 500 MILLILITER(S): 9 INJECTION INTRAMUSCULAR; INTRAVENOUS; SUBCUTANEOUS at 18:08

## 2022-10-28 RX ADMIN — SODIUM CHLORIDE 1 GRAM(S): 9 INJECTION INTRAMUSCULAR; INTRAVENOUS; SUBCUTANEOUS at 23:20

## 2022-10-28 RX ADMIN — CEFTRIAXONE 100 MILLIGRAM(S): 500 INJECTION, POWDER, FOR SOLUTION INTRAMUSCULAR; INTRAVENOUS at 18:09

## 2022-10-28 RX ADMIN — LOSARTAN POTASSIUM 25 MILLIGRAM(S): 100 TABLET, FILM COATED ORAL at 19:15

## 2022-10-28 NOTE — H&P ADULT - PROBLEM SELECTOR PLAN 4
Chronic moderate exacerbation   /93  s/p losartan 25mg in ED  Continue home amlodipine 5mg BID  Monitor off ARB as pt reported worsening of lightheadedness after starting to take it.   Consider alternate therapy

## 2022-10-28 NOTE — ED PROVIDER NOTE - PROGRESS NOTE DETAILS
[place], PGY-3  Hyponatremic to 128 and UTI on labs; ceftriaxone of NS bolus ordered. Admitted to hospitalist due to unsafe d/c; lives alone and needs complete syncope cardiac w/u and nephrology f/u for hyponatremia

## 2022-10-28 NOTE — ED PROVIDER NOTE - OBJECTIVE STATEMENT
89 y/o female with pmhx of HTN, hypothyroidism, ovarian cyst, cystocele, p/w ongoing issues of dizziness in the setting of hypotonic hyponatremia presenting with syncopal episode. Patient lives alone, syncopized in bathroom, does recall what she felt prior, woke up on bathroom floor with bruise to right leg and does not think she hit her head. Not on AC. Reports chronic dizziness that she had dealt with in past, ambulatory since fall and denies any headache, changes in vision/hearing, weakness, numbness/tingling, fevers/chills, n/v/d, cough, rashes, dysuria or hematuria.

## 2022-10-28 NOTE — ED PROVIDER NOTE - PHYSICAL EXAMINATION
Gen: WDWN, NAD, comfortable appearing   HEENT: Atraumatic head, PERRLA, EOMI, no nasal discharge, mucous membranes moist, no oropharyngeal edema/erythema/exudates   CV: RRR, +S1/S2, no M/R/G, equal b/l radial pulses 2+  Resp: CTAB, no W/R/R, SPO2 >95% on RA, no increased WOB   GI: Abdomen soft non-distended, NTTP, no masses/organomegaly   MSK/Skin: Bruising to right lateral thigh and TTP, no pelvic laxity, intact DP/PT pulses. No midline spinal TTP, no CVA tenderness, no open wounds, no bruising, no LE edema  Neuro: CN2-12 grossly intact, A&Ox4, MS +5/5 in UE and LE BL, gross sensation intact in UE and LE BL  Psych: appropriate mood

## 2022-10-28 NOTE — H&P ADULT - PROBLEM SELECTOR PLAN 3
acute on chronic   Na 128, TSH 5.4  Takes NaCl 1 tab TID - continue   Increase levothyroxine to 50mcg daily- recheck levels in 6-8 weeks  Urine studies Uosm: 374, Darlin: 47- pt takes salt tabs at home and received IVF  Consider Nephrology consult in AM   Monitor

## 2022-10-28 NOTE — ED PROVIDER NOTE - NS ED ROS FT
Gen: Denies fever, weight loss  CV: Denies chest pain, palpitations  Skin: Denies rash, erythema, color changes  Resp: Denies SOB, cough  Endo: Denies sensitivity to heat, cold, increased urination  GI: Denies diarrhea, constipation, nausea, vomiting  Msk: +Right leg pain. Denies back pain, LE swelling  : Denies dysuria, increased frequency  Neuro: Denies weakness, seizures

## 2022-10-28 NOTE — ED ADULT TRIAGE NOTE - RESPIRATORY RATE (BREATHS/MIN)
16
The anatomic location was identified, and patient was properly positioned. Using the appropriate technique, joint reduction was performed.

## 2022-10-28 NOTE — ED ADULT NURSE NOTE - OBJECTIVE STATEMENT
Pt received to room 2 A&OX4 ambulatory with assistance c/o dizziness x 2 days. Pt states "I passed out last night." Pt denies hitting head, feeling dizzy or CP before syncopal episode. Endorsing bruising to R leg. Ambulatory to bathroom with assistance. Respirations even and unlabored. NSR on CM. Denies CP, SOB, HA. Son at bedside. Bed in lowest position. Side rails up. Call bell within reach. Pt received to room 2 A&OX4 ambulatory with assistance c/o dizziness x 2 days. Pt states "I passed out last night." Pt denies hitting head, feeling dizzy or CP before syncopal episode. Hematoma to R thigh. BLE equal length. Ambulatory to bathroom with assistance. No facial droop noted. Respirations even and unlabored. NSR on CM. Denies CP, SOB, HA. Son at bedside. Bed in lowest position. Side rails up. Call bell within reach.

## 2022-10-28 NOTE — H&P ADULT - PROBLEM SELECTOR PLAN 1
New  - CT head w/ no acute changes   - no acute on file  - EKG ordered  * monitor on telemetry  * check  echo  *orthostatics negative   * trend troponin

## 2022-10-28 NOTE — H&P ADULT - NSHPLABSRESULTS_GEN_ALL_CORE
labs reviewed                        12.1   7.27  )-----------( 245      ( 28 Oct 2022 16:13 )             35.2       10    128<L>  |  93<L>  |  15  ----------------------------<  102<H>  4.3   |  25  |  0.68    Ca    9.2      28 Oct 2022 16:13  Mg     1.90     10-28    TPro  6.8  /  Alb  4.2  /  TBili  0.8  /  DBili  x   /  AST  22  /  ALT  12  /  AlkPhos  68  10-28      PT/INR - ( 28 Oct 2022 16:06 )   PT: 13.0 sec;   INR: 1.12 ratio         PTT - ( 28 Oct 2022 16:06 )  PTT:28.4 sec    15:45 - VBG - pH: 7.38  | pCO2: 44    | pO2: 32    | Lactate: 1.1        Urinalysis Basic - ( 28 Oct 2022 15:45 )    Color: Light Yellow / Appearance: Slightly Turbid / S.013 / pH: x  Gluc: x / Ketone: Negative  / Bili: Negative / Urobili: <2 mg/dL   Blood: x / Protein: Trace / Nitrite: Positive   Leuk Esterase: Large / RBC: 35 /HPF /  /HPF   Sq Epi: x / Non Sq Epi: 1 /HPF / Bacteria: Many        images interpreted by radiology with results personally reviewed:  xray right hip/pelvis/right knee: No acute fracture or dislocation.  Bilateral hip joints, SI joints, pelvic and obturator rings are intact. No knee joint effusion. The bones are diffusely osteopenic.    CT head:   No acute intracranial hemorrhage, brain edema, or mass effect.  No displaced calvarial fracture.

## 2022-10-28 NOTE — H&P ADULT - NSHPREVIEWOFSYSTEMS_GEN_ALL_CORE
REVIEW OF SYSTEMS:    CONSTITUTIONAL: No weakness, fevers or chills  EYES/ENT: No visual changes;  No dysphagia; No sore throat; No rhinorrhea; No sinus pain/pressure  NECK: No pain or stiffness  RESPIRATORY: No cough, wheezing, hemoptysis; No shortness of breath  CARDIOVASCULAR: No chest pain or palpitations; No lower extremity edema  GASTROINTESTINAL: No abdominal or epigastric pain. No nausea, vomiting, or hematemesis; No diarrhea or constipation. No melena or hematochezia.  GENITOURINARY: + dysuria, no frequency or hematuria  NEUROLOGICAL: No numbness or weakness + syncope, + dizziness  MSK: ambulates without assistance   SKIN: No itching, burning, rashes, or lesions   All other review of systems is negative unless indicated above.

## 2022-10-28 NOTE — ED ADULT NURSE NOTE - INTERVENTIONS DEFINITIONS
[place] to call system/Call bell, personal items and telephone within reach/Instruct patient to call for assistance/Non-slip footwear when patient is off stretcher/Physically safe environment: no spills, clutter or unnecessary equipment/Stretcher in lowest position, wheels locked, appropriate side rails in place

## 2022-10-28 NOTE — ED PROVIDER NOTE - CLINICAL SUMMARY MEDICAL DECISION MAKING FREE TEXT BOX
88 y/o female with pmhx of HTN, hypothyroidism, ovarian cyst, cystocele, p/w ongoing issues of dizziness in the setting of hypotonic hyponatremia presenting with syncopal episode, unwitnessed, unclear if head trauma, not on AC, right leg bruising/pain, ambulatory since event c/f hyponatremia as etiology; CMP, urine lytes. Low suspicion for ACS; EKG/trop vs. anemia vs. other electrolyte derangement. Will consider admission for syncope w/u pending sodium, CT head, xrays of LE

## 2022-10-28 NOTE — ED PROVIDER NOTE - ATTENDING CONTRIBUTION TO CARE
I have personally performed a face to face medical and diagnostic evaluation of the patient. I have discussed with and reviewed the Resident's note and agree with the History, ROS, Physical Exam and MDM unless otherwise indicated. A brief summary of my personal evaluation and impression can be found below.    89 y/o female with pmhx of HTN, hypothyroidism, ovarian cyst, cystocele, p/w ongoing issues of dizziness (in the past attributed to chronic hyponatremia, now on salt tabs) - presenting with unwitnessed syncopal episode, unclear if head trauma, not on AC, right leg bruising/pain, ambulatory since. No other traumatic findings. Plan for labs including CMP, urine lytes. Low suspicion for ACS; EKG/trop vs. anemia vs. other electrolyte derangement. CT head, xrays of LE for fall work up. If no actionable findings. Plan for admission for syncope work up. Murali Duff, ED Attending

## 2022-10-28 NOTE — H&P ADULT - PROBLEM SELECTOR PLAN 5
Chronic moderate exacerbation  TSH 5.4  Na 128 on admission  Increase dose from 37.5 to 50mcg daily  Repeat levels in 6-8 weeks

## 2022-10-28 NOTE — H&P ADULT - HISTORY OF PRESENT ILLNESS
89 yr old female with a pmh of HTN hypothyroidism hyponatremia on salt tabs TID, ovarian cyst, cystocele, who presents following a syncopal episode on Wednesday night. Pt reports she got up to go to the bathroom in the middle of the night and on her way to the bathroom she felt dizzy and next thing she knew she was on the floor and her right leg was bruised. She is unsure if she hit her head. She was mobilizing after the event. Her son brought her in as she still wasn't feeling well.   Son reports olmesartan was started ~1 week ago and the pt has been reporting feeling unwell since- worsening lightheadedness on standing.  Son also reports she was diagnosed with hyponatremia 6-7 months ago which was well controlled on salt tabs but recently saw PCP and had lab work with a Na of 125.  Pt does endorse dysuria.  Denies  headache, chest pain, palpitations, SOB, abdominal pain, joint pain, diarrhea/constipation.  Vitals: T 98.1, HR 81, /93, RR 20 satting 99% RA

## 2022-10-28 NOTE — ED ADULT TRIAGE NOTE - CHIEF COMPLAINT QUOTE
Pt c/o dizziness x few days. As per son, pt had syncope episode yesterday for unknown amt of time. Denies CP, SOB. PMHx: HTN, hyponatremia

## 2022-10-29 LAB
ANION GAP SERPL CALC-SCNC: 9 MMOL/L — SIGNIFICANT CHANGE UP (ref 7–14)
BASOPHILS # BLD AUTO: 0.03 K/UL — SIGNIFICANT CHANGE UP (ref 0–0.2)
BASOPHILS NFR BLD AUTO: 0.6 % — SIGNIFICANT CHANGE UP (ref 0–2)
BUN SERPL-MCNC: 11 MG/DL — SIGNIFICANT CHANGE UP (ref 7–23)
CALCIUM SERPL-MCNC: 8.8 MG/DL — SIGNIFICANT CHANGE UP (ref 8.4–10.5)
CHLORIDE SERPL-SCNC: 97 MMOL/L — LOW (ref 98–107)
CHOLEST SERPL-MCNC: 201 MG/DL — HIGH
CO2 SERPL-SCNC: 25 MMOL/L — SIGNIFICANT CHANGE UP (ref 22–31)
CREAT SERPL-MCNC: 0.69 MG/DL — SIGNIFICANT CHANGE UP (ref 0.5–1.3)
EGFR: 83 ML/MIN/1.73M2 — SIGNIFICANT CHANGE UP
EOSINOPHIL # BLD AUTO: 0.07 K/UL — SIGNIFICANT CHANGE UP (ref 0–0.5)
EOSINOPHIL NFR BLD AUTO: 1.4 % — SIGNIFICANT CHANGE UP (ref 0–6)
GLUCOSE SERPL-MCNC: 84 MG/DL — SIGNIFICANT CHANGE UP (ref 70–99)
HCT VFR BLD CALC: 35.7 % — SIGNIFICANT CHANGE UP (ref 34.5–45)
HDLC SERPL-MCNC: 68 MG/DL — SIGNIFICANT CHANGE UP
HGB BLD-MCNC: 12 G/DL — SIGNIFICANT CHANGE UP (ref 11.5–15.5)
IANC: 2.75 K/UL — SIGNIFICANT CHANGE UP (ref 1.8–7.4)
IMM GRANULOCYTES NFR BLD AUTO: 0.2 % — SIGNIFICANT CHANGE UP (ref 0–0.9)
LIPID PNL WITH DIRECT LDL SERPL: 124 MG/DL — HIGH
LYMPHOCYTES # BLD AUTO: 1.54 K/UL — SIGNIFICANT CHANGE UP (ref 1–3.3)
LYMPHOCYTES # BLD AUTO: 31 % — SIGNIFICANT CHANGE UP (ref 13–44)
MAGNESIUM SERPL-MCNC: 1.9 MG/DL — SIGNIFICANT CHANGE UP (ref 1.6–2.6)
MCHC RBC-ENTMCNC: 31.3 PG — SIGNIFICANT CHANGE UP (ref 27–34)
MCHC RBC-ENTMCNC: 33.6 GM/DL — SIGNIFICANT CHANGE UP (ref 32–36)
MCV RBC AUTO: 93.2 FL — SIGNIFICANT CHANGE UP (ref 80–100)
MONOCYTES # BLD AUTO: 0.57 K/UL — SIGNIFICANT CHANGE UP (ref 0–0.9)
MONOCYTES NFR BLD AUTO: 11.5 % — SIGNIFICANT CHANGE UP (ref 2–14)
NEUTROPHILS # BLD AUTO: 2.75 K/UL — SIGNIFICANT CHANGE UP (ref 1.8–7.4)
NEUTROPHILS NFR BLD AUTO: 55.3 % — SIGNIFICANT CHANGE UP (ref 43–77)
NON HDL CHOLESTEROL: 133 MG/DL — HIGH
NRBC # BLD: 0 /100 WBCS — SIGNIFICANT CHANGE UP (ref 0–0)
NRBC # FLD: 0 K/UL — SIGNIFICANT CHANGE UP (ref 0–0)
PHOSPHATE SERPL-MCNC: 3.6 MG/DL — SIGNIFICANT CHANGE UP (ref 2.5–4.5)
PLATELET # BLD AUTO: 237 K/UL — SIGNIFICANT CHANGE UP (ref 150–400)
POTASSIUM SERPL-MCNC: 3.9 MMOL/L — SIGNIFICANT CHANGE UP (ref 3.5–5.3)
POTASSIUM SERPL-SCNC: 3.9 MMOL/L — SIGNIFICANT CHANGE UP (ref 3.5–5.3)
RBC # BLD: 3.83 M/UL — SIGNIFICANT CHANGE UP (ref 3.8–5.2)
RBC # FLD: 13.4 % — SIGNIFICANT CHANGE UP (ref 10.3–14.5)
SODIUM SERPL-SCNC: 131 MMOL/L — LOW (ref 135–145)
T3 SERPL-MCNC: 51 NG/DL — LOW (ref 80–200)
T4 AB SER-ACNC: 5.36 UG/DL — SIGNIFICANT CHANGE UP (ref 5.1–13)
TRIGL SERPL-MCNC: 47 MG/DL — SIGNIFICANT CHANGE UP
TROPONIN T, HIGH SENSITIVITY RESULT: 28 NG/L — SIGNIFICANT CHANGE UP
WBC # BLD: 4.97 K/UL — SIGNIFICANT CHANGE UP (ref 3.8–10.5)
WBC # FLD AUTO: 4.97 K/UL — SIGNIFICANT CHANGE UP (ref 3.8–10.5)

## 2022-10-29 PROCEDURE — 99233 SBSQ HOSP IP/OBS HIGH 50: CPT

## 2022-10-29 RX ORDER — INFLUENZA VIRUS VACCINE 15; 15; 15; 15 UG/.5ML; UG/.5ML; UG/.5ML; UG/.5ML
0.7 SUSPENSION INTRAMUSCULAR ONCE
Refills: 0 | Status: DISCONTINUED | OUTPATIENT
Start: 2022-10-29 | End: 2022-11-01

## 2022-10-29 RX ORDER — POLYETHYLENE GLYCOL 3350 17 G/17G
17 POWDER, FOR SOLUTION ORAL DAILY
Refills: 0 | Status: DISCONTINUED | OUTPATIENT
Start: 2022-10-29 | End: 2022-11-01

## 2022-10-29 RX ORDER — SENNA PLUS 8.6 MG/1
2 TABLET ORAL AT BEDTIME
Refills: 0 | Status: DISCONTINUED | OUTPATIENT
Start: 2022-10-29 | End: 2022-11-01

## 2022-10-29 RX ADMIN — AMLODIPINE BESYLATE 5 MILLIGRAM(S): 2.5 TABLET ORAL at 10:09

## 2022-10-29 RX ADMIN — SENNA PLUS 2 TABLET(S): 8.6 TABLET ORAL at 21:53

## 2022-10-29 RX ADMIN — CEFTRIAXONE 100 MILLIGRAM(S): 500 INJECTION, POWDER, FOR SOLUTION INTRAMUSCULAR; INTRAVENOUS at 18:06

## 2022-10-29 RX ADMIN — AMLODIPINE BESYLATE 5 MILLIGRAM(S): 2.5 TABLET ORAL at 21:52

## 2022-10-29 RX ADMIN — POLYETHYLENE GLYCOL 3350 17 GRAM(S): 17 POWDER, FOR SOLUTION ORAL at 14:03

## 2022-10-29 RX ADMIN — SODIUM CHLORIDE 1 GRAM(S): 9 INJECTION INTRAMUSCULAR; INTRAVENOUS; SUBCUTANEOUS at 21:52

## 2022-10-29 RX ADMIN — SODIUM CHLORIDE 1 GRAM(S): 9 INJECTION INTRAMUSCULAR; INTRAVENOUS; SUBCUTANEOUS at 05:35

## 2022-10-29 RX ADMIN — ENOXAPARIN SODIUM 40 MILLIGRAM(S): 100 INJECTION SUBCUTANEOUS at 05:35

## 2022-10-29 RX ADMIN — Medication 50 MICROGRAM(S): at 05:35

## 2022-10-29 RX ADMIN — SODIUM CHLORIDE 1 GRAM(S): 9 INJECTION INTRAMUSCULAR; INTRAVENOUS; SUBCUTANEOUS at 12:37

## 2022-10-29 NOTE — PATIENT PROFILE ADULT - DEAF OR HARD OF HEARING?
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  Admitted with symptoms of worsening right heart failure. cardiac cath negative for any CAD in 2013.   Demand troponin levels noted.  Patient has lost ~15 kg since readmission but still has symptoms of cor pulmonale.   She may qualify for a lung transplant,  had scheduled appointment last week with Dr. Unger to discuss care further.    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  ·	DVT  ·	Hypokalemia  ·	Hyponatremia    Plan:  ·	EKG today  improved from 615  ·	Currently diuretics on hold 2/2 worsening hypokalemia/hyponatremia (due to volume overload) and significant contraction alkalosis.  s/p Acetazolamide x 2 doses. Needs increased potassium supplementation (likely total body stores quite depleted). Renal following  ·	Continue telemetry for severe electrolyte abnormalities.  ·	Continue to monitor renal function and electrolytes, daily weight   ·	Cont with supplemental O2/ [place], pulmonary following   ·	Cont with asa/statin    ·	Patient to have SKYE evaluation and Dr. Bharath Adamson, cardio follow up as out patient    ·	Pt had scheduled appointment with Dr. 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  Admitted with symptoms of worsening right heart failure. cardiac cath negative for any CAD in 2013.   Demand troponin levels noted.  Patient has lost ~15 kg since readmission but still has symptoms of cor pulmonale.   She may qualify for a lung transplant,  had scheduled appointment last week with Dr. Unger to discuss care further.    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  ·	DVT  ·	Hypokalemia  ·	Hyponatremia    Plan:  ·	EKG today  improved from 615  ·	Currently diuretics on hold 2/2 worsening hypokalemia/hyponatremia (due to volume overload) and significant contraction alkalosis.  s/p Acetazolamide x 2 doses. Needs increased potassium supplementation (likely total body stores quite depleted). Renal following  ·	Continue telemetry for severe electrolyte abnormalities.  ·	Continue to monitor renal function and electrolytes, daily weight   ·	Cont with supplemental O2/ [place], pulmonary following   ·	Cont with asa/statin    ·	Cont AC and bridge with Warfarin until therapeutic   ·	Patient to have SKYE evaluation and Dr. Bharath Adamson, cardio follow up as out patient    ·	Pt had scheduled appointment with Dr. Unger  @ pul HTN clinic last week, pt will reschedule post discharge no

## 2022-10-29 NOTE — PHYSICAL THERAPY INITIAL EVALUATION ADULT - PERTINENT HX OF CURRENT PROBLEM, REHAB EVAL
89 yr old female with a pmh of HTN hypothyroidism hyponatremia on salt tabs TID, ovarian cyst, cystocele, who presents following a syncopal episode on Wednesday night. Pt reports she got up to go to the bathroom in the middle of the night and on her way to the bathroom she felt dizzy and next thing she knew she was on the floor and her right leg was bruised. She is unsure if she hit her head. She was mobilizing after the event. Her son brought her in as she still wasn't feeling well.

## 2022-10-29 NOTE — PATIENT PROFILE ADULT - FALL HARM RISK - HARM RISK INTERVENTIONS
Assistance with ambulation/Assistance OOB with selected safe patient handling equipment/Communicate Risk of Fall with Harm to all staff/Monitor gait and stability/Reinforce activity limits and safety measures with patient and family/Sit up slowly, dangle for a short time, stand at bedside before walking/Tailored Fall Risk Interventions/Visual Cue: Yellow wristband and red socks/Bed in lowest position, wheels locked, appropriate side rails in place/Call bell, personal items and telephone in reach/Instruct patient to call for assistance before getting out of bed or chair/Non-slip footwear when patient is out of bed/[place] to call system/Physically safe environment - no spills, clutter or unnecessary equipment/Purposeful Proactive Rounding/Room/bathroom lighting operational, light cord in reach

## 2022-10-29 NOTE — PHYSICAL THERAPY INITIAL EVALUATION ADULT - ADDITIONAL COMMENTS
Patient lives alone in condo, no steps to negotiate. Patient was independent in all ADL prior to admission. Patient was not using an assistive device prior to admission.

## 2022-10-30 LAB
ANION GAP SERPL CALC-SCNC: 8 MMOL/L — SIGNIFICANT CHANGE UP (ref 7–14)
BUN SERPL-MCNC: 14 MG/DL — SIGNIFICANT CHANGE UP (ref 7–23)
CALCIUM SERPL-MCNC: 9.3 MG/DL — SIGNIFICANT CHANGE UP (ref 8.4–10.5)
CHLORIDE SERPL-SCNC: 98 MMOL/L — SIGNIFICANT CHANGE UP (ref 98–107)
CO2 SERPL-SCNC: 26 MMOL/L — SIGNIFICANT CHANGE UP (ref 22–31)
CREAT SERPL-MCNC: 0.63 MG/DL — SIGNIFICANT CHANGE UP (ref 0.5–1.3)
EGFR: 85 ML/MIN/1.73M2 — SIGNIFICANT CHANGE UP
GLUCOSE SERPL-MCNC: 87 MG/DL — SIGNIFICANT CHANGE UP (ref 70–99)
MAGNESIUM SERPL-MCNC: 2 MG/DL — SIGNIFICANT CHANGE UP (ref 1.6–2.6)
PHOSPHATE SERPL-MCNC: 3.5 MG/DL — SIGNIFICANT CHANGE UP (ref 2.5–4.5)
POTASSIUM SERPL-MCNC: 3.8 MMOL/L — SIGNIFICANT CHANGE UP (ref 3.5–5.3)
POTASSIUM SERPL-SCNC: 3.8 MMOL/L — SIGNIFICANT CHANGE UP (ref 3.5–5.3)
SODIUM SERPL-SCNC: 132 MMOL/L — LOW (ref 135–145)

## 2022-10-30 PROCEDURE — 99233 SBSQ HOSP IP/OBS HIGH 50: CPT

## 2022-10-30 RX ADMIN — SENNA PLUS 2 TABLET(S): 8.6 TABLET ORAL at 21:06

## 2022-10-30 RX ADMIN — Medication 50 MICROGRAM(S): at 06:13

## 2022-10-30 RX ADMIN — CEFTRIAXONE 100 MILLIGRAM(S): 500 INJECTION, POWDER, FOR SOLUTION INTRAMUSCULAR; INTRAVENOUS at 17:55

## 2022-10-30 RX ADMIN — SODIUM CHLORIDE 1 GRAM(S): 9 INJECTION INTRAMUSCULAR; INTRAVENOUS; SUBCUTANEOUS at 12:47

## 2022-10-30 RX ADMIN — POLYETHYLENE GLYCOL 3350 17 GRAM(S): 17 POWDER, FOR SOLUTION ORAL at 12:47

## 2022-10-30 RX ADMIN — AMLODIPINE BESYLATE 5 MILLIGRAM(S): 2.5 TABLET ORAL at 08:56

## 2022-10-30 RX ADMIN — ENOXAPARIN SODIUM 40 MILLIGRAM(S): 100 INJECTION SUBCUTANEOUS at 06:12

## 2022-10-30 RX ADMIN — SODIUM CHLORIDE 1 GRAM(S): 9 INJECTION INTRAMUSCULAR; INTRAVENOUS; SUBCUTANEOUS at 21:06

## 2022-10-30 RX ADMIN — SODIUM CHLORIDE 1 GRAM(S): 9 INJECTION INTRAMUSCULAR; INTRAVENOUS; SUBCUTANEOUS at 06:13

## 2022-10-30 RX ADMIN — AMLODIPINE BESYLATE 5 MILLIGRAM(S): 2.5 TABLET ORAL at 21:07

## 2022-10-31 LAB
-  AMIKACIN: SIGNIFICANT CHANGE UP
-  AMOXICILLIN/CLAVULANIC ACID: SIGNIFICANT CHANGE UP
-  AMPICILLIN/SULBACTAM: SIGNIFICANT CHANGE UP
-  AMPICILLIN: SIGNIFICANT CHANGE UP
-  AZTREONAM: SIGNIFICANT CHANGE UP
-  CEFAZOLIN: SIGNIFICANT CHANGE UP
-  CEFEPIME: SIGNIFICANT CHANGE UP
-  CEFOXITIN: SIGNIFICANT CHANGE UP
-  CEFTRIAXONE: SIGNIFICANT CHANGE UP
-  CIPROFLOXACIN: SIGNIFICANT CHANGE UP
-  ERTAPENEM: SIGNIFICANT CHANGE UP
-  GENTAMICIN: SIGNIFICANT CHANGE UP
-  IMIPENEM: SIGNIFICANT CHANGE UP
-  LEVOFLOXACIN: SIGNIFICANT CHANGE UP
-  MEROPENEM: SIGNIFICANT CHANGE UP
-  NITROFURANTOIN: SIGNIFICANT CHANGE UP
-  PIPERACILLIN/TAZOBACTAM: SIGNIFICANT CHANGE UP
-  TIGECYCLINE: SIGNIFICANT CHANGE UP
-  TOBRAMYCIN: SIGNIFICANT CHANGE UP
-  TRIMETHOPRIM/SULFAMETHOXAZOLE: SIGNIFICANT CHANGE UP
ANION GAP SERPL CALC-SCNC: 9 MMOL/L — SIGNIFICANT CHANGE UP (ref 7–14)
BUN SERPL-MCNC: 18 MG/DL — SIGNIFICANT CHANGE UP (ref 7–23)
CALCIUM SERPL-MCNC: 9.3 MG/DL — SIGNIFICANT CHANGE UP (ref 8.4–10.5)
CHLORIDE SERPL-SCNC: 96 MMOL/L — LOW (ref 98–107)
CO2 SERPL-SCNC: 27 MMOL/L — SIGNIFICANT CHANGE UP (ref 22–31)
CREAT SERPL-MCNC: 0.68 MG/DL — SIGNIFICANT CHANGE UP (ref 0.5–1.3)
CULTURE RESULTS: SIGNIFICANT CHANGE UP
EGFR: 83 ML/MIN/1.73M2 — SIGNIFICANT CHANGE UP
GLUCOSE SERPL-MCNC: 89 MG/DL — SIGNIFICANT CHANGE UP (ref 70–99)
MAGNESIUM SERPL-MCNC: 2 MG/DL — SIGNIFICANT CHANGE UP (ref 1.6–2.6)
METHOD TYPE: SIGNIFICANT CHANGE UP
ORGANISM # SPEC MICROSCOPIC CNT: SIGNIFICANT CHANGE UP
ORGANISM # SPEC MICROSCOPIC CNT: SIGNIFICANT CHANGE UP
PHOSPHATE SERPL-MCNC: 3.4 MG/DL — SIGNIFICANT CHANGE UP (ref 2.5–4.5)
POTASSIUM SERPL-MCNC: 3.8 MMOL/L — SIGNIFICANT CHANGE UP (ref 3.5–5.3)
POTASSIUM SERPL-SCNC: 3.8 MMOL/L — SIGNIFICANT CHANGE UP (ref 3.5–5.3)
SODIUM SERPL-SCNC: 132 MMOL/L — LOW (ref 135–145)
SPECIMEN SOURCE: SIGNIFICANT CHANGE UP

## 2022-10-31 PROCEDURE — 93306 TTE W/DOPPLER COMPLETE: CPT | Mod: 26

## 2022-10-31 PROCEDURE — 99233 SBSQ HOSP IP/OBS HIGH 50: CPT

## 2022-10-31 RX ORDER — LANOLIN ALCOHOL/MO/W.PET/CERES
6 CREAM (GRAM) TOPICAL AT BEDTIME
Refills: 0 | Status: DISCONTINUED | OUTPATIENT
Start: 2022-10-31 | End: 2022-11-01

## 2022-10-31 RX ADMIN — AMLODIPINE BESYLATE 5 MILLIGRAM(S): 2.5 TABLET ORAL at 21:25

## 2022-10-31 RX ADMIN — SENNA PLUS 2 TABLET(S): 8.6 TABLET ORAL at 21:25

## 2022-10-31 RX ADMIN — AMLODIPINE BESYLATE 5 MILLIGRAM(S): 2.5 TABLET ORAL at 09:29

## 2022-10-31 RX ADMIN — POLYETHYLENE GLYCOL 3350 17 GRAM(S): 17 POWDER, FOR SOLUTION ORAL at 15:20

## 2022-10-31 RX ADMIN — ENOXAPARIN SODIUM 40 MILLIGRAM(S): 100 INJECTION SUBCUTANEOUS at 06:06

## 2022-10-31 RX ADMIN — Medication 6 MILLIGRAM(S): at 22:53

## 2022-10-31 RX ADMIN — SODIUM CHLORIDE 1 GRAM(S): 9 INJECTION INTRAMUSCULAR; INTRAVENOUS; SUBCUTANEOUS at 21:25

## 2022-10-31 RX ADMIN — SODIUM CHLORIDE 1 GRAM(S): 9 INJECTION INTRAMUSCULAR; INTRAVENOUS; SUBCUTANEOUS at 15:19

## 2022-10-31 RX ADMIN — Medication 50 MICROGRAM(S): at 06:06

## 2022-10-31 RX ADMIN — CEFTRIAXONE 100 MILLIGRAM(S): 500 INJECTION, POWDER, FOR SOLUTION INTRAMUSCULAR; INTRAVENOUS at 18:05

## 2022-10-31 RX ADMIN — SODIUM CHLORIDE 1 GRAM(S): 9 INJECTION INTRAMUSCULAR; INTRAVENOUS; SUBCUTANEOUS at 06:06

## 2022-10-31 NOTE — PROGRESS NOTE ADULT - PROBLEM SELECTOR PLAN 3
acute on chronic   Na 128 -> 131->132, TSH 5.4,  low t3, t4 low normal   Takes NaCl 1 tab TID - continue   Increase levothyroxine to 50mcg daily- recheck levels in 6-8 weeks  Urine studies Uosm: 374, Darlin: 47- pt takes salt tabs at home and received IVF  Monitor
acute on chronic   Na 128 -> 131->132, TSH 5.4,  low t3, t4 low normal   Takes NaCl 1 tab TID - continue   Increased levothyroxine to 50mcg daily- recheck levels in 6-8 weeks  Urine studies Uosm: 374, Darlin: 47- pt takes salt tabs at home and received IVF  Urine studies suggestive of SIADH, c/w salt tbs  Monitor
acute on chronic   Na 128 -> 131, TSH 5.4,  low t3, t4 low normal   Takes NaCl 1 tab TID - continue   Increase levothyroxine to 50mcg daily- recheck levels in 6-8 weeks  Urine studies Uosm: 374, Darlin: 47- pt takes salt tabs at home and received IVF  Monitor

## 2022-10-31 NOTE — PROGRESS NOTE ADULT - PROBLEM SELECTOR PLAN 4
Chronic moderate exacerbation   /93  s/p losartan 25mg in ED  Continue home amlodipine 5mg BID  Monitor off ARB as pt reported worsening of lightheadedness after starting to take it  BP acceptable off losartan, continue to hold ARB for now  Monitor BP
Chronic moderate exacerbation   /93  s/p losartan 25mg in ED  Continue home amlodipine 5mg BID  Monitor off ARB as pt reported worsening of lightheadedness after starting to take it.   Consider alternate therapy
Chronic moderate exacerbation   /93  s/p losartan 25mg in ED  Continue home amlodipine 5mg BID  Monitor off ARB as pt reported worsening of lightheadedness after starting to take it.   Consider alternate therapy

## 2022-10-31 NOTE — PROGRESS NOTE ADULT - PROBLEM SELECTOR PLAN 1
Troponin X3 neg, orthostatic negative. CT head w/ no acute changes   - monitor on telemetry  - TTE with EF 63%, mild AS, mild-mod AI, mild MR, LAE, mild diastolic dysfunction, borderline pulmonary HTN  - PT eval --> HPT  - Fall precautions
Troponin X3 neg, orthostatic negative. CT head w/ no acute changes   - fu ekg  - monitor on telemetry  -check  echo  -PT eval, fall precautions
Troponin X3 neg, orthostatic negative. CT head w/ no acute changes   - monitor on telemetry  -check  echo  -PT eval, fall precautions

## 2022-10-31 NOTE — PROGRESS NOTE ADULT - ASSESSMENT
89 yr old female presenting with syncope and uti

## 2022-10-31 NOTE — PROGRESS NOTE ADULT - SUBJECTIVE AND OBJECTIVE BOX
Subjective:  MITCH, VSS  Denies any complaints. Doesn't recall if she had loc with fall but states she was able to call for help. Denies current chest pain, no sob. reports dysuria, denies fever/chills, no flank pain.     MEDICATIONS  (STANDING):  amLODIPine   Tablet 5 milliGRAM(s) Oral <User Schedule>  cefTRIAXone   IVPB 1000 milliGRAM(s) IV Intermittent every 24 hours  enoxaparin Injectable 40 milliGRAM(s) SubCutaneous every 24 hours  influenza  Vaccine (HIGH DOSE) 0.7 milliLiter(s) IntraMuscular once  levothyroxine 50 MICROGram(s) Oral daily  polyethylene glycol 3350 17 Gram(s) Oral daily  senna 2 Tablet(s) Oral at bedtime  sodium chloride 1 Gram(s) Oral three times a day    MEDICATIONS  (PRN):  acetaminophen     Tablet .. 650 milliGRAM(s) Oral every 6 hours PRN Temp greater or equal to 38C (100.4F), Mild Pain (1 - 3)  aluminum hydroxide/magnesium hydroxide/simethicone Suspension 30 milliLiter(s) Oral every 4 hours PRN Dyspepsia  melatonin 3 milliGRAM(s) Oral at bedtime PRN Insomnia  ondansetron Injectable 4 milliGRAM(s) IV Push every 8 hours PRN Nausea and/or Vomiting    VITAL SIGNS:  T(C): 37 (10-29-22 @ 12:07), Max: 37 (10-29-22 @ 02:43)  T(F): 98.6 (10-29-22 @ 12:07), Max: 98.6 (10-29-22 @ 02:43)  HR: 67 (10-29-22 @ 12:07) (63 - 82)  BP: 138/83 (10-29-22 @ 12:07) (137/66 - 165/72)  BP(mean): --  RR: 18 (10-29-22 @ 12:07) (17 - 20)  SpO2: 99% (10-29-22 @ 12:07) (96% - 99%)  Wt(kg): --    PHYSICAL EXAM:  Constitutional: WDWN resting comfortably in bed; NAD  Head: NC/AT  Eyes: PERRL, EOMI, anicteric sclera  Respiratory: CTA B/L; no W/R/R  Cardiac: +S1/S2; RRR; no M/R/G  Gastrointestinal: soft, NT/ND; no rebound or guarding; +BSx4  Extremities: WWP, no clubbing or cyanosis; no peripheral edema  Musculoskeletal: NROM x4; no joint swelling, tenderness or erythema  Vascular: 2+ radial, DP/PT pulses B/L  Neurologic: AAOx3; CNII-XII grossly intact; no focal deficits    LABS:                        12.0   4.97  )-----------( 237      ( 29 Oct 2022 06:00 )             35.7     10    131<L>  |  97<L>  |  11  ----------------------------<  84  3.9   |  25  |  0.69    Ca    8.8      29 Oct 2022 06:00  Phos  3.6     10-29  Mg     1.90     10-29    TPro  6.8  /  Alb  4.2  /  TBili  0.8  /  DBili  x   /  AST  22  /  ALT  12  /  AlkPhos  68  1028    PT/INR - ( 28 Oct 2022 16:06 )   PT: 13.0 sec;   INR: 1.12 ratio         PTT - ( 28 Oct 2022 16:06 )  PTT:28.4 sec  Urinalysis Basic - ( 28 Oct 2022 15:45 )    Color: Light Yellow / Appearance: Slightly Turbid / S.013 / pH: x  Gluc: x / Ketone: Negative  / Bili: Negative / Urobili: <2 mg/dL   Blood: x / Protein: Trace / Nitrite: Positive   Leuk Esterase: Large / RBC: 35 /HPF /  /HPF   Sq Epi: x / Non Sq Epi: 1 /HPF / Bacteria: Many      CAPILLARY BLOOD GLUCOSE          RADIOLOGY & ADDITIONAL TESTS: Reviewed.     
  Patient is a 89y old  Female who presents with a chief complaint of syncope (30 Oct 2022 15:20)      INTERVAL HPI/OVERNIGHT EVENTS:  Seen by me this afternoon, sitting in chair having lunch, feeling better. Still with some urinary discomfort but it's better, good appetite.    Review of Systems: 12 point review of systems otherwise negative    MEDICATIONS  (STANDING):  amLODIPine   Tablet 5 milliGRAM(s) Oral <User Schedule>  cefTRIAXone   IVPB 1000 milliGRAM(s) IV Intermittent every 24 hours  enoxaparin Injectable 40 milliGRAM(s) SubCutaneous every 24 hours  influenza  Vaccine (HIGH DOSE) 0.7 milliLiter(s) IntraMuscular once  levothyroxine 50 MICROGram(s) Oral daily  polyethylene glycol 3350 17 Gram(s) Oral daily  senna 2 Tablet(s) Oral at bedtime  sodium chloride 1 Gram(s) Oral three times a day    MEDICATIONS  (PRN):  acetaminophen     Tablet .. 650 milliGRAM(s) Oral every 6 hours PRN Temp greater or equal to 38C (100.4F), Mild Pain (1 - 3)  aluminum hydroxide/magnesium hydroxide/simethicone Suspension 30 milliLiter(s) Oral every 4 hours PRN Dyspepsia  melatonin 3 milliGRAM(s) Oral at bedtime PRN Insomnia  ondansetron Injectable 4 milliGRAM(s) IV Push every 8 hours PRN Nausea and/or Vomiting      Allergies    No Known Allergies    Intolerances          Vital Signs Last 24 Hrs  T(C): 36.6 (31 Oct 2022 09:25), Max: 37.1 (30 Oct 2022 21:00)  T(F): 97.8 (31 Oct 2022 09:25), Max: 98.7 (30 Oct 2022 21:00)  HR: 72 (31 Oct 2022 09:25) (65 - 81)  BP: 147/81 (31 Oct 2022 09:25) (132/99 - 147/81)  BP(mean): --  RR: 17 (31 Oct 2022 09:25) (16 - 17)  SpO2: 100% (31 Oct 2022 09:25) (95% - 100%)    Parameters below as of 31 Oct 2022 09:25  Patient On (Oxygen Delivery Method): room air      CAPILLARY BLOOD GLUCOSE            Physical Exam:    Daily     Daily   General:  Well appearing, NAD, not cachetic  HEENT:  Nonicteric, PERRLA  CV:  RRR, no JVD  Lungs:  CTA B/L, no wheezes, rales, rhonchi  Abdomen:  Soft, non-tender, no distended, positive BS  Extremities:  2+ pulses, no c/c, no edema  Skin:  Warm and dry, no rashes  :  No paulino  Neuro:  AAOx3, non-focal, CN II-XII grossly intact  No Restraints    LABS:    10-31    132<L>  |  96<L>  |  18  ----------------------------<  89  3.8   |  27  |  0.68    Ca    9.3      31 Oct 2022 07:50  Phos  3.4     10-31  Mg     2.00     10-31              RADIOLOGY & ADDITIONAL TESTS:  Reviewed by me    
Subjective:  MITCH, VSS  Denies any chest pain, sob, no palpitations.     MEDICATIONS  (STANDING):  amLODIPine   Tablet 5 milliGRAM(s) Oral <User Schedule>  cefTRIAXone   IVPB 1000 milliGRAM(s) IV Intermittent every 24 hours  enoxaparin Injectable 40 milliGRAM(s) SubCutaneous every 24 hours  influenza  Vaccine (HIGH DOSE) 0.7 milliLiter(s) IntraMuscular once  levothyroxine 50 MICROGram(s) Oral daily  polyethylene glycol 3350 17 Gram(s) Oral daily  senna 2 Tablet(s) Oral at bedtime  sodium chloride 1 Gram(s) Oral three times a day    MEDICATIONS  (PRN):  acetaminophen     Tablet .. 650 milliGRAM(s) Oral every 6 hours PRN Temp greater or equal to 38C (100.4F), Mild Pain (1 - 3)  aluminum hydroxide/magnesium hydroxide/simethicone Suspension 30 milliLiter(s) Oral every 4 hours PRN Dyspepsia  melatonin 3 milliGRAM(s) Oral at bedtime PRN Insomnia  ondansetron Injectable 4 milliGRAM(s) IV Push every 8 hours PRN Nausea and/or Vomiting    VITAL SIGNS:  T(C): 36.8 (30 Oct 2022 08:52), Max: 37.1 (29 Oct 2022 16:25)  T(F): 98.2 (30 Oct 2022 08:52), Max: 98.8 (29 Oct 2022 16:25)  HR: 76 (30 Oct 2022 08:52) (6 - 76)  BP: 142/84 (30 Oct 2022 08:52) (124/69 - 142/84)  BP(mean): --  RR: 18 (30 Oct 2022 08:52) (18 - 18)  SpO2: 97% (30 Oct 2022 08:52) (97% - 98%)    Parameters below as of 30 Oct 2022 08:52  Patient On (Oxygen Delivery Method): room air      PHYSICAL EXAM:  Constitutional: WDWN resting comfortably in bed; NAD  Head: NC/AT  Eyes: PERRL, EOMI, anicteric sclera  Respiratory: CTA B/L; no W/R/R  Cardiac: +S1/S2; RRR; no M/R/G  Gastrointestinal: soft, NT/ND; no rebound or guarding; +BSx4  Extremities: WWP, no clubbing or cyanosis; no peripheral edema  Musculoskeletal: NROM x4; no joint swelling, tenderness or erythema  Neurologic: AAOx3; CNII-XII grossly intact; no focal deficits    LABS:               12.0   4.97  )-----------( 237      ( 29 Oct 2022 06:00 )             35.7     10-30    132<L>  |  98  |  14  ----------------------------<  87  3.8   |  26  |  0.63    Ca    9.3      30 Oct 2022 07:01  Phos  3.5     10-30  Mg     2.00     10-30    TPro  6.8  /  Alb  4.2  /  TBili  0.8  /  DBili  x   /  AST  22  /  ALT  12  /  AlkPhos  68  10-28    PT/INR - ( 28 Oct 2022 16:06 )   PT: 13.0 sec;   INR: 1.12 ratio         PTT - ( 28 Oct 2022 16:06 )  PTT:28.4 sec  Urinalysis Basic - ( 28 Oct 2022 15:45 )    Color: Light Yellow / Appearance: Slightly Turbid / S.013 / pH: x  Gluc: x / Ketone: Negative  / Bili: Negative / Urobili: <2 mg/dL   Blood: x / Protein: Trace / Nitrite: Positive   Leuk Esterase: Large / RBC: 35 /HPF /  /HPF   Sq Epi: x / Non Sq Epi: 1 /HPF / Bacteria: Many              CAPILLARY BLOOD GLUCOSE          RADIOLOGY & ADDITIONAL TESTS: Reviewed.

## 2022-10-31 NOTE — PROGRESS NOTE ADULT - PROBLEM SELECTOR PLAN 2
New  UA positive  Ucx with Klebsiella pneumoniae, pending sensitivities  C/w CTX Day 4
New  UA positive  Ucx pending  Continue Rocephin
New  UA positive  Ucx with GNR pending speciation  Continue Rocephin

## 2022-10-31 NOTE — PROGRESS NOTE ADULT - PROBLEM SELECTOR PLAN 5
Chronic moderate exacerbation  TSH 5.4, low t3, t4 low normal   Na 128 on admission  Increase dose from 37.5 to 50mcg daily  Repeat levels in 6-8 weeks
Chronic moderate exacerbation  TSH 5.4, low t3, t4 low normal   Na 128 on admission  Increased dose from 37.5 to 50mcg daily  Repeat levels in 6-8 weeks    # DVT PPx  C/w LMWH    Discussed with ACP  Planned for discharge home in 1-2 days after final UCx results are back
Chronic moderate exacerbation  TSH 5.4, low t3, t4 low normal   Na 128 on admission  Increase dose from 37.5 to 50mcg daily  Repeat levels in 6-8 weeks

## 2022-10-31 NOTE — CHART NOTE - NSCHARTNOTEFT_GEN_A_CORE
Notified by RN that patient crying and inconsolable. Patient seen at bedside by provider. Patient in NAD, watching TV in bed. Patient currently A&Ox4 calm and cooperative with conversation. Patient did become tearful and expressed frustration with her son selling her car and losing independence. Emotional support given. melatonin PRN.

## 2022-11-01 ENCOUNTER — APPOINTMENT (OUTPATIENT)
Dept: UROGYNECOLOGY | Facility: CLINIC | Age: 87
End: 2022-11-01

## 2022-11-01 ENCOUNTER — TRANSCRIPTION ENCOUNTER (OUTPATIENT)
Age: 87
End: 2022-11-01

## 2022-11-01 VITALS
RESPIRATION RATE: 16 BRPM | DIASTOLIC BLOOD PRESSURE: 78 MMHG | OXYGEN SATURATION: 100 % | HEART RATE: 73 BPM | TEMPERATURE: 98 F | SYSTOLIC BLOOD PRESSURE: 156 MMHG

## 2022-11-01 LAB
ANION GAP SERPL CALC-SCNC: 12 MMOL/L — SIGNIFICANT CHANGE UP (ref 7–14)
BUN SERPL-MCNC: 20 MG/DL — SIGNIFICANT CHANGE UP (ref 7–23)
CALCIUM SERPL-MCNC: 9.3 MG/DL — SIGNIFICANT CHANGE UP (ref 8.4–10.5)
CHLORIDE SERPL-SCNC: 96 MMOL/L — LOW (ref 98–107)
CO2 SERPL-SCNC: 24 MMOL/L — SIGNIFICANT CHANGE UP (ref 22–31)
CREAT SERPL-MCNC: 0.72 MG/DL — SIGNIFICANT CHANGE UP (ref 0.5–1.3)
EGFR: 80 ML/MIN/1.73M2 — SIGNIFICANT CHANGE UP
GLUCOSE SERPL-MCNC: 84 MG/DL — SIGNIFICANT CHANGE UP (ref 70–99)
MAGNESIUM SERPL-MCNC: 2.1 MG/DL — SIGNIFICANT CHANGE UP (ref 1.6–2.6)
PHOSPHATE SERPL-MCNC: 3.7 MG/DL — SIGNIFICANT CHANGE UP (ref 2.5–4.5)
POTASSIUM SERPL-MCNC: 4.2 MMOL/L — SIGNIFICANT CHANGE UP (ref 3.5–5.3)
POTASSIUM SERPL-SCNC: 4.2 MMOL/L — SIGNIFICANT CHANGE UP (ref 3.5–5.3)
SODIUM SERPL-SCNC: 132 MMOL/L — LOW (ref 135–145)

## 2022-11-01 PROCEDURE — 99239 HOSP IP/OBS DSCHRG MGMT >30: CPT

## 2022-11-01 RX ORDER — OLMESARTAN MEDOXOMIL 5 MG/1
1 TABLET, FILM COATED ORAL
Qty: 0 | Refills: 0 | DISCHARGE

## 2022-11-01 RX ORDER — LEVOTHYROXINE SODIUM 125 MCG
1 TABLET ORAL
Qty: 30 | Refills: 0
Start: 2022-11-01 | End: 2022-11-30

## 2022-11-01 RX ORDER — POLYETHYLENE GLYCOL 3350 17 G/17G
17 POWDER, FOR SOLUTION ORAL
Qty: 0 | Refills: 0 | DISCHARGE
Start: 2022-11-01

## 2022-11-01 RX ADMIN — ENOXAPARIN SODIUM 40 MILLIGRAM(S): 100 INJECTION SUBCUTANEOUS at 06:53

## 2022-11-01 RX ADMIN — SODIUM CHLORIDE 1 GRAM(S): 9 INJECTION INTRAMUSCULAR; INTRAVENOUS; SUBCUTANEOUS at 06:53

## 2022-11-01 RX ADMIN — Medication 50 MICROGRAM(S): at 06:53

## 2022-11-01 RX ADMIN — SODIUM CHLORIDE 1 GRAM(S): 9 INJECTION INTRAMUSCULAR; INTRAVENOUS; SUBCUTANEOUS at 12:50

## 2022-11-01 RX ADMIN — AMLODIPINE BESYLATE 5 MILLIGRAM(S): 2.5 TABLET ORAL at 10:17

## 2022-11-01 RX ADMIN — POLYETHYLENE GLYCOL 3350 17 GRAM(S): 17 POWDER, FOR SOLUTION ORAL at 12:48

## 2022-11-01 NOTE — DISCHARGE NOTE NURSING/CASE MANAGEMENT/SOCIAL WORK - PATIENT PORTAL LINK FT
You can access the FollowMyHealth Patient Portal offered by [facility] by registering at the following website: [email or web address]. By joining [employer]’s FollowMyHealth portal, you will also be able to view your health information using other applications (apps) compatible with our system.

## 2022-11-01 NOTE — DISCHARGE NOTE NURSING/CASE MANAGEMENT/SOCIAL WORK - NSDCPEFALRISK_GEN_ALL_CORE
For information on Fall & Injury Prevention, visit: [email or web address] OR  [email or web address] OR  https://www.cdc.gov/steadi/patient.html

## 2022-11-01 NOTE — DISCHARGE NOTE PROVIDER - NSDCCPCAREPLAN_GEN_ALL_CORE_FT
PRINCIPAL DISCHARGE DIAGNOSIS  Diagnosis: Syncope  Assessment and Plan of Treatment: Your CT head was unremarkable. Echocardiogram shows mild aortic stenosis. Mild-moderate aortic regurgitation. Telemetry monitoring showed no abnormal events. You are being discharged home with home physical therapy.         SECONDARY DISCHARGE DIAGNOSES  Diagnosis: Acute UTI  Assessment and Plan of Treatment: You were treated in the hospital with ceftriaxone for a Klebsiella urinary tract infection.    Diagnosis: Hyponatremia  Assessment and Plan of Treatment: Your sodium level is now 132. Normal levels are 135-145. Please follow up with your PCP within one week of discharge to monitor sodium levels.    Diagnosis: Hypothyroidism  Assessment and Plan of Treatment: Your thyroid hormone level was elevated at 5.44. Therefore your levothyroxine dose was increased to 50cg. Please have your thyroid function tests montored in 6-8weeks for correction.

## 2022-11-01 NOTE — DISCHARGE NOTE PROVIDER - CARE PROVIDER_API CALL
Eleno Hutton (MD)  Family Medicine  [address] [place] [address]  Phone: [phone]  Fax: [phone]  Follow Up Time:

## 2022-11-01 NOTE — DISCHARGE NOTE PROVIDER - NSDCFUSCHEDAPPT_GEN_ALL_CORE_FT
Estrella Mccarty  [facility]  UROGYN [address]  Scheduled Appointment: 12/08/2022

## 2022-11-01 NOTE — DISCHARGE NOTE PROVIDER - NSDCHHNEEDSERVICE_GEN_ALL_CORE
[address]  [phone]  [phone]               Thank you for choosing us for your health care visit with Renae Chapman PT.   We are glad to serve you and happy to provide you with Rehabilitation services

## 2022-11-01 NOTE — DISCHARGE NOTE NURSING/CASE MANAGEMENT/SOCIAL WORK - NSDCVIVACCINE_GEN_ALL_CORE_FT
Tdap; 22-Dec-2015 08:25; Manish Gordillo (ELSY); [employer]; [id number]; IntraMuscular; Deltoid Left.; 0.5 milliLiter(s); VIS (VIS Published: 09-May-2013, VIS Presented: 22-Dec-2015);

## 2022-11-01 NOTE — DISCHARGE NOTE PROVIDER - DISCHARGE SERVICE FOR PATIENT
on the discharge service for the patient. I have reviewed and made amendments to the documentation where necessary. Improved.

## 2022-11-01 NOTE — DISCHARGE NOTE PROVIDER - HOSPITAL COURSE
89 yr old female with a pmh of HTN hypothyroidism hyponatremia on salt tabs TID, ovarian cyst, cystocele, who presents following a syncopal episode . Patient was found to have a UTI and was started on ceftriaxone.   CT head w/ no acute changes. Patient was monitored on telemetry with no events. TTE with EF 63%, mild AS, mild-mod AI, mild MR, LAE, mild diastolic dysfunction, borderline pulmonary HTN  Urine culture with klebsiella pneumonia sensitive to ceftriaxone-pt received a 4 day course. Sodium level on admission 128, and is now 132. On 11/01 pt is medically stable for discharge home with home PT as d/w Dr. Mcdaniel.

## 2022-11-01 NOTE — DISCHARGE NOTE PROVIDER - NSDCMRMEDTOKEN_GEN_ALL_CORE_FT
amLODIPine 5 mg oral tablet: 1 tab(s) orally 2 times a day  levothyroxine 50 mcg (0.05 mg) oral tablet: 1 tab(s) orally once a day  polyethylene glycol 3350 oral powder for reconstitution: 17 gram(s) orally once a day as needed for constipation   sodium chloride 1 g oral tablet: 1 tab(s) orally 3 times a day   amLODIPine 5 mg oral tablet: 1 tab(s) orally 2 times a day  levothyroxine 50 mcg (0.05 mg) oral tablet: 1 tab(s) orally once a day  Physical therapy : Physical therapy 3 times a week  polyethylene glycol 3350 oral powder for reconstitution: 17 gram(s) orally once a day as needed for constipation   sodium chloride 1 g oral tablet: 1 tab(s) orally 3 times a day

## 2022-11-01 NOTE — DISCHARGE NOTE NURSING/CASE MANAGEMENT/SOCIAL WORK - NSSCNAMETXT_GEN_ALL_CORE
Rehab at home paperwork given to pt/ Outpt Script provided by pt medical team- Pt can call any agency that accepts insurance to get rehab at home pt services as needed

## 2022-11-04 ENCOUNTER — NON-APPOINTMENT (OUTPATIENT)
Age: 87
End: 2022-11-04

## 2022-11-20 NOTE — ED ADULT NURSE NOTE - NSFALLRSKASSISTTYPE_ED_ALL_ED
Mother reports fever, cough and nasal congestion starting Thursday   Denies emesis or diarrhea. States pt is constipated   Tmax 104  No known sick contacts, pt goes to school   Cough medicine given this morning     Walking

## 2022-11-22 ENCOUNTER — APPOINTMENT (OUTPATIENT)
Dept: UROGYNECOLOGY | Facility: CLINIC | Age: 87
End: 2022-11-22

## 2022-11-22 PROCEDURE — 99213 OFFICE O/P EST LOW 20 MIN: CPT

## 2022-11-23 NOTE — PHYSICAL EXAM
[No Acute Distress] : in no acute distress [Well developed] : well developed [Well Nourished] : ~L well nourished [Oriented x3] : oriented to person, place, and time [Warm and Dry] : was warm and dry to touch [Normal Gait] : gait was normal [Vulvar Atrophy] : vulvar atrophy [Labia Majora] : were normal [Labia Minora] : were normal [Normal] : was normal [Normal Appearance] : general appearance was normal [Atrophy] : atrophy [Dry Mucosa] : dry mucosa [No Bleeding] : there was no active vaginal bleeding [FreeTextEntry1] : \par  [Tenderness] : ~T no ~M abdominal tenderness observed [Distended] : not distended

## 2022-11-23 NOTE — DISCUSSION/SUMMARY
[FreeTextEntry1] : Pelvic organ prolapse \par -Continue with RS #3 \par -Bleeding precautions reviewed \par -Instructions reviewed with patient and her son \par \par Vaginal atrophy \par -Estring placement\par -Advised that she may continue with Replens \par -Risks and benefits of Estring reviewed\par \par Vaginal irritation:\par -BV panel sent\par -Rx for Lotrisone given\par -Reviewed proper pericare\par \par Followup in 3 months or sooner if needed\par Advised to contact office if any issues arise

## 2022-11-23 NOTE — HISTORY OF PRESENT ILLNESS
[FreeTextEntry1] : Fide is a 89 yo who presents with her son today for follow up of pelvic organ prolapse managed by RS#3.She denies pelvic pain, vaginal bleeding.  She is urinating well, denies bowel issues. She has the Estring in place.\par She does report some vaginal irritation. She reports occasional use of panty liners.

## 2022-11-23 NOTE — PROCEDURE
[New] : new [Good Fit] : fits well [Discharge] : there is vaginal discharge [Pessary Inserted] : inserted [Pessary Washed] : washed [None] : no bleeding [Refit] : refit is not needed [Erosion] : no evidence of erosion [Erythema] : no erythema [Infection] : no evidence of infection [FreeTextEntry1] : RS#3 [FreeTextEntry2] : Old Estring removed, new one inserted [de-identified] : scant yellow discharge [FreeTextEntry8] : Pessary precautions and instructions discussed.

## 2022-11-29 DIAGNOSIS — B37.9 CANDIDIASIS, UNSPECIFIED: ICD-10-CM

## 2022-11-29 LAB
CANDIDA VAG CYTO: DETECTED
G VAGINALIS+PREV SP MTYP VAG QL MICRO: DETECTED
T VAGINALIS VAG QL WET PREP: NOT DETECTED

## 2022-12-30 ENCOUNTER — NON-APPOINTMENT (OUTPATIENT)
Age: 87
End: 2022-12-30

## 2023-01-09 ENCOUNTER — APPOINTMENT (OUTPATIENT)
Dept: UROGYNECOLOGY | Facility: CLINIC | Age: 88
End: 2023-01-09
Payer: MEDICARE

## 2023-01-09 LAB
BILIRUB UR QL STRIP: NEGATIVE
CLARITY UR: NORMAL
COLLECTION METHOD: NORMAL
GLUCOSE UR-MCNC: NEGATIVE
HCG UR QL: 0.2 EU/DL
HGB UR QL STRIP.AUTO: NORMAL
KETONES UR-MCNC: NEGATIVE
LEUKOCYTE ESTERASE UR QL STRIP: NEGATIVE
NITRITE UR QL STRIP: NEGATIVE
PH UR STRIP: 5.5
PROT UR STRIP-MCNC: 30
SP GR UR STRIP: 1.02

## 2023-01-09 PROCEDURE — 99214 OFFICE O/P EST MOD 30 MIN: CPT

## 2023-01-09 PROCEDURE — 81003 URINALYSIS AUTO W/O SCOPE: CPT | Mod: QW

## 2023-01-11 ENCOUNTER — NON-APPOINTMENT (OUTPATIENT)
Age: 88
End: 2023-01-11

## 2023-01-11 LAB — BACTERIA UR CULT: NORMAL

## 2023-01-12 ENCOUNTER — NON-APPOINTMENT (OUTPATIENT)
Age: 88
End: 2023-01-12

## 2023-01-12 LAB
APPEARANCE: CLEAR
BACTERIA: NEGATIVE
BILIRUBIN URINE: NEGATIVE
BLOOD URINE: ABNORMAL
COLOR: NORMAL
GLUCOSE QUALITATIVE U: NEGATIVE
HYALINE CASTS: 0 /LPF
KETONES URINE: NEGATIVE
LEUKOCYTE ESTERASE URINE: NEGATIVE
MICROSCOPIC-UA: NORMAL
NITRITE URINE: NEGATIVE
PH URINE: 6
PROTEIN URINE: NORMAL
RED BLOOD CELLS URINE: 8 /HPF
SPECIFIC GRAVITY URINE: 1.02
SQUAMOUS EPITHELIAL CELLS: 0 /HPF
UROBILINOGEN URINE: NORMAL
WHITE BLOOD CELLS URINE: 1 /HPF

## 2023-01-30 ENCOUNTER — NON-APPOINTMENT (OUTPATIENT)
Age: 88
End: 2023-01-30

## 2023-02-01 ENCOUNTER — APPOINTMENT (OUTPATIENT)
Dept: UROGYNECOLOGY | Facility: CLINIC | Age: 88
End: 2023-02-01
Payer: MEDICARE

## 2023-02-01 PROCEDURE — 99213 OFFICE O/P EST LOW 20 MIN: CPT

## 2023-02-01 NOTE — COUNSELING
[FreeTextEntry1] : I reviewed the above with the patient and her son.  We discussed several options for her vaginal burning 1 was 2 take the pessary and Estring out in go back to vaginal estrogen.  We discussed leaving the pessary out and Estring given.  We also discussed leaving both and to see if she can tolerate such.  She and her son had decided on leaving the pessary out and just having the Estring replaced that she had difficulty placing in the vaginal estrogen and we will see how she does symptomatically.

## 2023-02-01 NOTE — DISCUSSION/SUMMARY
[FreeTextEntry1] : Patient to follow-up for pessary reinsertion in April.  If she develops symptomatic prolapse or worsening symptoms she will return earlier.  All questions were answered.

## 2023-02-01 NOTE — PHYSICAL EXAM
[Chaperone Present] : A chaperone was present in the examining room during all aspects of the physical examination [No Acute Distress] : in no acute distress [Well developed] : well developed [Well Nourished] : ~L well nourished [Normal Mood/Affect] : mood and affect are normal [Vulvar Atrophy] : vulvar atrophy [Normal Appearance] : general appearance was normal [Atrophy] : atrophy [Normal] : normal [FreeTextEntry4] : Pessary and Estring removed for exam, slight erythema posteriorly from pessary

## 2023-02-01 NOTE — HISTORY OF PRESENT ILLNESS
[FreeTextEntry1] : Patient returns today with her son complaining of vaginal burning.  She has a pessary and Estring in place.  The Estring as per her son was placed in approximately 6 months ago.  She reports vaginal burning approximately 75% of the time.  She had a urine culture performed in January which showed no growth.  She had positive microscopic hematuria and had a work-up in 2021

## 2023-02-08 ENCOUNTER — NON-APPOINTMENT (OUTPATIENT)
Age: 88
End: 2023-02-08

## 2023-02-10 NOTE — ED ADULT TRIAGE NOTE - NSWEIGHTCALCTOOLDRUG_GEN_A_CORE
[facility] Daily Progress Note    Date: 2/10/2023    Patient was seen and examined stable    Review of Systems:    Last Stool Occurrence: 1 (02/08/23 1059)    All other systems reviewed and negative    Medications/Infusions:    Same meds    Physical Exam:     Vitals:   Temp,  95.9 °F (35.5 °C) (02/10/23 0029). Pulse 67 (02/10/23 0029). Blood Pressure (!) 160/72 (02/10/23 0029). Respiration 20 (02/10/23 0029)      Weight over the past 48 Hours:   Patient Vitals for the past 48 hrs:   Weight   02/08/23 1046 53.8 kg (118 lb 9.7 oz)   02/09/23 0600 52.5 kg (115 lb 11.9 oz)   02/10/23 0540 55.9 kg (123 lb 3.8 oz)       Head: Normocephalic, without obvious abnormality, atraumatic  Eyes: PERRL, conjunctiva/corneas clear, EOM's intact, fundi benign, both eyes  Ears: Normal external ear canals, both ears  Nose: Nares normal, septum midline, mucosa normal, no drainage   Throat:Lips, mucosa, and tongue normal  Neck:Supple, symmetrical, trachea midline, no adenopathy; thyroid: No enlargement/tenderness/nodules  Lungs: respirations unlabored  Chest wall:No tenderness or deformity  Abdomen:Soft, non-tender.    Laboratory Results:  Lab Results   Component Value Date    SODIUM 142 02/10/2023    POTASSIUM 4.0 02/10/2023    CHLORIDE 113 (H) 02/10/2023    CO2 23 02/10/2023    ANIONGAP 10 02/10/2023    BUN 34 (H) 02/10/2023    CREATININE 0.83 02/10/2023    WBC 6.7 02/10/2023    RBC 3.20 (L) 02/10/2023    HCT 27.5 (L) 02/10/2023    HGB 9.1 (L) 02/10/2023     (L) 02/10/2023    GLUCOSE 88 02/10/2023    TSH 4.323 02/08/2023    VB12 699 02/09/2023    VITD25 11.2 (L) 07/06/2016    NH3 14 02/09/2023       ASSESSMENT AND PLAN:       1.  Major neurocognitive deficits with activated power of .   continue Aricept if it is helping and continue nonpharmacological interventions like reading, crossword puzzles, memory games, card games and bingo.     2.  Muscle weakness.  physical therapy and occupational  therapy.     3.  Goals of care.   Code status is do not resuscitate.  Power of  is activated.  The daughter is hoping that he can come home.      Time spent is about 25  minutes, greater than 50% is in counseling and coordination of care.       Case was discussed with the patient, family, , hospitalist and the nursing staff.     Reji Mendoza MD      used

## 2023-02-16 ENCOUNTER — NON-APPOINTMENT (OUTPATIENT)
Age: 88
End: 2023-02-16

## 2023-02-22 ENCOUNTER — APPOINTMENT (OUTPATIENT)
Dept: UROGYNECOLOGY | Facility: CLINIC | Age: 88
End: 2023-02-22

## 2023-02-24 ENCOUNTER — APPOINTMENT (OUTPATIENT)
Dept: UROGYNECOLOGY | Facility: CLINIC | Age: 88
End: 2023-02-24
Payer: MEDICARE

## 2023-02-24 PROCEDURE — 99214 OFFICE O/P EST MOD 30 MIN: CPT

## 2023-02-24 NOTE — PHYSICAL EXAM
[No Acute Distress] : in no acute distress [Well developed] : well developed [Well Nourished] : ~L well nourished [Good Hygeine] : demonstrates good hygeine [Oriented x3] : oriented to person, place, and time [Normal Memory] : ~T memory was ~L unimpaired [Normal Mood/Affect] : mood and affect are normal [Soft, Nontender] : the abdomen was soft and nontender [Warm and Dry] : was warm and dry to touch [Normal Gait] : gait was normal

## 2023-02-24 NOTE — PROCEDURE
[Good Fit] : fits well [Pessary Inserted] : inserted [Pessary Washed] : washed [H2O] : H2O [None] : no bleeding [Medication Review] : Medicaiton Review: Patient verbalizes understanding of risks and benefits [Fluid Management] : Fluid Management: patient verbalizes understanding 6-10 cups per day [Bowel Management] : Bowel Management: patient verbalizes understanding of daily dietary fiber intake [Bladder Training] : Bladder Training: Patient given information with verbal understanding [Erythema] : no erythema [Discharge] : no vaginal discharge [Infection] : no evidence of infection [FreeTextEntry1] : reinserted Ring with support # 3. [FreeTextEntry2] : Estring in place [FreeTextEntry3] : Estring, Replens, Aquaphor [FreeTextEntry8] : Reinforced daily pericare.

## 2023-02-24 NOTE — HISTORY OF PRESENT ILLNESS
[FreeTextEntry1] : Accompanied by son Cheng.\par \par Fide, 89y/o presents to the office with c/o vaginal burning/irritation.  PT had pessary removed at last visit and feels her prolapse coming down.  She has a Estring in place and using Replens in vagina.  She was asked to apply generous amount of either Aquaphor or A&D ointment.  Pt's son Cheng bough a tube to the office and wants demonstration to pt to apply.  Denies any trouble with urination or moving BM.\par \par Pt had Renal US and findings: Bilateral  renal cysts.  no hydronephrosis or calculi.

## 2023-02-24 NOTE — DISCUSSION/SUMMARY
[FreeTextEntry1] : Pelvic prolapse:\par -Reinserted Ring with support # 3.\par -Precaution and instructions were reviewed.\par \par Vaginal atrophy/irritation/burning:\par -Estring in place (in vaginal vault).\par -May continue use of Replens.\par -Apply generous amount of Aquaphor or A&D ointment to area after toileting and wearing a pad daily.\par \par PT has appt on 4/11/2023.  She will keep appt.  Bring in new Estring from pharmacy to change.  IF pt have any problems/concern to call office.  PT and son Cheng aware and agrees.

## 2023-02-28 ENCOUNTER — NON-APPOINTMENT (OUTPATIENT)
Age: 88
End: 2023-02-28

## 2023-03-03 ENCOUNTER — APPOINTMENT (OUTPATIENT)
Dept: UROGYNECOLOGY | Facility: CLINIC | Age: 88
End: 2023-03-03
Payer: MEDICARE

## 2023-03-03 PROCEDURE — 99213 OFFICE O/P EST LOW 20 MIN: CPT

## 2023-03-03 RX ORDER — ESTRADIOL 2 MG/1
2 RING VAGINAL
Qty: 1 | Refills: 5 | Status: ACTIVE | COMMUNITY
Start: 2022-08-24 | End: 1900-01-01

## 2023-03-03 NOTE — PHYSICAL EXAM
[No Acute Distress] : in no acute distress [Well developed] : well developed [Well Nourished] : ~L well nourished [Good Hygeine] : demonstrates good hygeine [Oriented x3] : oriented to person, place, and time [Normal Memory] : ~T memory was ~L unimpaired [Normal Mood/Affect] : mood and affect are normal [Soft, Nontender] : the abdomen was soft and nontender [Warm and Dry] : was warm and dry to touch [Normal Gait] : gait was normal [Vulvar Atrophy] : vulvar atrophy [Atrophy] : atrophy [Uterine Prolapse] : uterine prolapse [No Bleeding] : there was no active vaginal bleeding

## 2023-03-03 NOTE — DISCUSSION/SUMMARY
[FreeTextEntry1] : Pelvic prolapse:\par -Continue with  Ring with support # 3.\par -Precautions and instructions were reviewed.\par \par Vaginal atrophy/irritation/burning:\par -Estring in place (in vaginal vault).\par -May continue use of Replens at night BIW\par -Advised to use coconut oil\par -Apply generous amount of Aquaphor or A&D ointment to area after toileting and wearing a pad daily.\par -Contact info for [employer] provided\par \par F/U 4/11\par Advised to call office with any issues or concerns

## 2023-03-03 NOTE — HISTORY OF PRESENT ILLNESS
[FreeTextEntry1] : Accompanied by son Cheng.\par \par Fide, 89y/o presents to the office with c/o vaginal burning/irritation. Patient has a pelvic prolapse that is supported by a RS #3. The pessary was reinserted at her last visit. She also reports that the pessary is falling out.  She has a Estring in place and using Replens in vagina.  She was asked to apply generous amount of either Aquaphor or A&D ointment. Denies any vaginal bleeding, pelvic pain or discomfort. Denies any trouble with urination or moving BM.\par \par

## 2023-03-03 NOTE — PROCEDURE
[Good Fit] : fits well [Refit] : refit is not needed [Erosion] : no evidence of erosion [Erythema] : no erythema [Discharge] : no vaginal discharge [Infection] : no evidence of infection [Pessary Inserted] : inserted [Pessary Washed] : washed [None] : no bleeding [Medication Review] : Medicaiton Review: Patient verbalizes understanding of risks and benefits [Fluid Management] : Fluid Management: patient verbalizes understanding 6-10 cups per day [Bowel Management] : Bowel Management: patient verbalizes understanding of daily dietary fiber intake [Bladder Training] : Bladder Training: Patient given information with verbal understanding [FreeTextEntry1] : Ring with support # 3. [FreeTextEntry2] : Estring in place [FreeTextEntry3] : Estring, Replens, Aquaphor [FreeTextEntry8] : Reinforced daily pericare.

## 2023-04-11 ENCOUNTER — APPOINTMENT (OUTPATIENT)
Dept: UROGYNECOLOGY | Facility: CLINIC | Age: 88
End: 2023-04-11
Payer: MEDICARE

## 2023-04-11 PROCEDURE — 99213 OFFICE O/P EST LOW 20 MIN: CPT

## 2023-04-11 NOTE — HISTORY OF PRESENT ILLNESS
[FreeTextEntry1] : Accompanied by son Cheng.\par \par Fide, 89y/o presents to the office for follow up for pelvic prolapse. Prolapse is supported with RS #3.  She has a Estring in place and using Replens in vagina.  She was asked to apply generous amount of either Aquaphor or A&D ointment. Denies any vaginal bleeding, pelvic pain or discomfort. Denies any trouble with urination or moving BM.\par \par

## 2023-04-11 NOTE — PHYSICAL EXAM
[No Acute Distress] : in no acute distress [Well developed] : well developed [Well Nourished] : ~L well nourished [Good Hygeine] : demonstrates good hygeine [Oriented x3] : oriented to person, place, and time [Normal Mood/Affect] : mood and affect are normal [Soft, Nontender] : the abdomen was soft and nontender [Warm and Dry] : was warm and dry to touch [Normal Gait] : gait was normal [Vulvar Atrophy] : vulvar atrophy [Atrophy] : atrophy [Uterine Prolapse] : uterine prolapse [No Bleeding] : there was no active vaginal bleeding

## 2023-04-11 NOTE — PROCEDURE
[New] : new [Good Fit] : fits well [Refit] : refit is not needed [Erosion] : no evidence of erosion [Erythema] : no erythema [Discharge] : no vaginal discharge [Infection] : no evidence of infection [Pessary Inserted] : inserted [Pessary Washed] : washed [None] : no bleeding [Medication Review] : Medicaiton Review: Patient verbalizes understanding of risks and benefits [Fluid Management] : Fluid Management: patient verbalizes understanding 6-10 cups per day [Bowel Management] : Bowel Management: patient verbalizes understanding of daily dietary fiber intake [Bladder Training] : Bladder Training: Patient given information with verbal understanding [FreeTextEntry1] : Ring with support # 3. [FreeTextEntry2] : Estring removed and new one inserted [FreeTextEntry3] : Estring, Replens, Aquaphor [FreeTextEntry8] : Reinforced daily pericare.

## 2023-04-11 NOTE — DISCUSSION/SUMMARY
[FreeTextEntry1] : Pelvic prolapse:\par -Continue with  Ring with support # 3.\par -Precautions and instructions were reviewed.\par \par Vaginal atrophy/irritation/burning:\par -Estring in place (in vaginal vault).\par -May continue use of Replens at night BIW\par -Advised to use coconut oil\par -Apply generous amount of Aquaphor or A&D ointment to area after toileting and wearing a pad daily.\par \par \par RTO in 3 months or sooner if needed\par Advised to call office with any issues or concerns

## 2023-05-08 NOTE — PATIENT PROFILE ADULT - FALL HARM RISK TYPE OF ASSESSMENT
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

## 2023-05-24 ENCOUNTER — APPOINTMENT (OUTPATIENT)
Dept: UROGYNECOLOGY | Facility: CLINIC | Age: 88
End: 2023-05-24
Payer: MEDICARE

## 2023-05-24 VITALS
BODY MASS INDEX: 25.4 KG/M2 | SYSTOLIC BLOOD PRESSURE: 173 MMHG | DIASTOLIC BLOOD PRESSURE: 89 MMHG | HEIGHT: 62 IN | WEIGHT: 138 LBS | TEMPERATURE: 98 F

## 2023-05-24 DIAGNOSIS — B96.89 ACUTE VAGINITIS: ICD-10-CM

## 2023-05-24 DIAGNOSIS — N76.0 ACUTE VAGINITIS: ICD-10-CM

## 2023-05-24 PROCEDURE — 83986 ASSAY PH BODY FLUID NOS: CPT | Mod: QW

## 2023-05-24 PROCEDURE — 99215 OFFICE O/P EST HI 40 MIN: CPT

## 2023-05-24 RX ORDER — CLOTRIMAZOLE AND BETAMETHASONE DIPROPIONATE 10; .5 MG/G; MG/G
1-0.05 CREAM TOPICAL TWICE DAILY
Qty: 1 | Refills: 0 | Status: COMPLETED | COMMUNITY
Start: 2022-07-01 | End: 2023-05-24

## 2023-05-24 RX ORDER — METRONIDAZOLE 500 MG/1
500 TABLET ORAL TWICE DAILY
Qty: 14 | Refills: 0 | Status: COMPLETED | COMMUNITY
Start: 2021-08-13 | End: 2023-05-24

## 2023-05-24 RX ORDER — CONJUGATED ESTROGENS 0.62 MG/G
0.62 CREAM VAGINAL
Qty: 30 | Refills: 0 | Status: COMPLETED | COMMUNITY
Start: 2021-03-03 | End: 2023-05-24

## 2023-05-24 RX ORDER — ESTRADIOL 0.1 MG/G
0.1 CREAM VAGINAL
Qty: 0.5 | Refills: 1 | Status: COMPLETED | COMMUNITY
Start: 2021-04-06 | End: 2023-05-24

## 2023-05-24 RX ORDER — FLUCONAZOLE 150 MG/1
150 TABLET ORAL
Qty: 2 | Refills: 0 | Status: COMPLETED | COMMUNITY
Start: 2022-11-29 | End: 2023-05-24

## 2023-05-24 RX ORDER — METRONIDAZOLE 7.5 MG/G
0.75 GEL VAGINAL
Qty: 1 | Refills: 0 | Status: COMPLETED | COMMUNITY
Start: 2022-11-29 | End: 2023-05-24

## 2023-05-24 RX ORDER — LIDOCAINE 5 G/100G
5 OINTMENT TOPICAL
Qty: 35 | Refills: 0 | Status: COMPLETED | COMMUNITY
Start: 2021-02-02 | End: 2023-05-24

## 2023-05-24 RX ORDER — NITROFURANTOIN (MONOHYDRATE/MACROCRYSTALS) 25; 75 MG/1; MG/1
100 CAPSULE ORAL
Qty: 14 | Refills: 0 | Status: COMPLETED | COMMUNITY
Start: 2021-03-08 | End: 2023-05-24

## 2023-05-24 RX ORDER — FLUCONAZOLE 150 MG/1
150 TABLET ORAL
Qty: 2 | Refills: 0 | Status: COMPLETED | COMMUNITY
Start: 2021-08-25 | End: 2023-05-24

## 2023-05-24 RX ORDER — CLOTRIMAZOLE AND BETAMETHASONE DIPROPIONATE 10; .5 MG/G; MG/G
1-0.05 CREAM TOPICAL
Qty: 15 | Refills: 0 | Status: COMPLETED | COMMUNITY
Start: 2021-02-09 | End: 2023-05-24

## 2023-05-24 RX ORDER — CLOTRIMAZOLE AND BETAMETHASONE DIPROPIONATE 10; .5 MG/G; MG/G
1-0.05 CREAM TOPICAL TWICE DAILY
Qty: 1 | Refills: 0 | Status: COMPLETED | COMMUNITY
Start: 2022-11-22 | End: 2023-05-24

## 2023-05-24 RX ORDER — TERCONAZOLE 8 MG/G
0.8 CREAM VAGINAL
Qty: 1 | Refills: 0 | Status: COMPLETED | COMMUNITY
Start: 2022-08-02 | End: 2023-05-24

## 2023-05-24 RX ORDER — LIDOCAINE AND PRILOCAINE 25; 25 MG/G; MG/G
2.5-2.5 CREAM TOPICAL
Qty: 30 | Refills: 0 | Status: COMPLETED | COMMUNITY
Start: 2021-03-24 | End: 2023-05-24

## 2023-05-24 RX ORDER — CLOBETASOL PROPIONATE 0.5 MG/G
0.05 OINTMENT TOPICAL
Qty: 15 | Refills: 0 | Status: COMPLETED | COMMUNITY
Start: 2021-02-12 | End: 2023-05-24

## 2023-05-24 NOTE — DISCUSSION/SUMMARY
[Reviewed Clinical Lab Test(s)] : Results of clinical tests were reviewed. [FreeTextEntry1] : I had a long discussion with Fide and her son Cheng in the office today re: GSM, RBV and chronic vulvovaginal burning. I reviewed the pathologies, possible etiologies, diagnosis, symptoms and treatment options available. Written information was given to the patient.\par \par Gabapentin/Lidocaine 10/5% topical cream ftp to introitus BID. Amount and location of cream application was demonstrated to patient today in office via mirror demonstration. I explained that the cream does not work overnight and she needs to continue it for a minimum of 6-8 weeks before we re-evaluate efficacy.\par \par Genital hygiene rev'd in detail. We discussed the effects of altered pH on the vaginal microbiome and risk for urinary and vaginal infections and vulvovaginal pain/burning. \par -do not wash more than 1x/day (with water only)\par -may do sitz baths w cool/warm water up to BID\par -no soaps, body washes, bath gels\par -no fabric softeners or dryer sheets on underwear\par -unscented pads (NOT ALWAYS)\par -ice to vulva\par -stop Replens\par -White Crisco or Vaseline to vulva as needed\par \par Wet prep done in office\par pH=5.5, no lacto, few epithelial cells, no hyphae, ++clue cells, ++whiff, no trich, no WBC, no parabasal cells\par KOH prep neg hyphae\par \par Clindamycin 2% vaginal 1 applicator PV qhs x 7 nights.\par THEN\par Metrogel vaginal 1 applicator PV 2x/week (M/Th) x 3 months\par \par Continue Estring\par f/u Dr. William as scheduled\par \par RTO 3 months\par \par \par

## 2023-05-24 NOTE — PHYSICAL EXAM
[Chaperone Present] : A chaperone was present in the examining room during all aspects of the physical examination [No Acute Distress] : in no acute distress [Well developed] : well developed [Well Nourished] : ~L well nourished [Good Hygeine] : demonstrates good hygeine [Person] : oriented to person [Place] : oriented to place [Time] : oriented to time [Memory Confabulation] : confabulation was observed [Memory Significant Loss Requiring Supervision] : significant memory loss requiring supervision [Concentration Intact] : normal concentrating ability [Visual Intact] : visual attention was ~T not ~L decreased [Repeating Phrases] : no difficulty repeating a phrase [Current Events] : adequate knowledge of current events [Past History] : adequate fund of knowledge regarding past history [Vocabulary] : adequate fund of knowledge regarding vocabulary [Appropriate] : appropriate [No Edema] : ~T edema was not present [Warm and Dry] : was warm and dry to touch [Normal Gait] : gait was normal [Vulvar Atrophy] : vulvar atrophy [Labia Majora] : were normal [Labia Minora] : were normal [No Bleeding] : there was no active vaginal bleeding [Normal] : no abnormalities [Exam Deferred] : was deferred [FreeTextEntry1] : KATHLEEN Villaseñor [Span Intact] : the attention span was decreased [Tenderness] : ~T no ~M abdominal tenderness observed [Distended] : not distended [FreeTextEntry4] : Pessary and Estring in place

## 2023-05-24 NOTE — HISTORY OF PRESENT ILLNESS
[FreeTextEntry1] : Fide is a very pleasant 89 yo F referred by Dr. William's office.\par She is here with her son, Cheng, who is providing HPI.\par \par Burning has been on and off x 2 years. Used estrace x 1 year without improvement. She has been using Estring x 9 months. After the 2nd Estring her burning worsened. She is using Replens x 2-3x/day.\par She was put on numerous topicals (steroids and antifungals) without improvement. \par Has pessary. \par \par Burning is constant, 75% of the time.\par She is currently using coconut oil without much relief.\par No incontinence. \par No increased pain with prolonged sitting or clothes.\par \tonya Was treated for BV and VVC in November 2022. No worsening of symptoms with infection.\par Went to GYN who did not "see anything".\par \par Hypothyroid\par

## 2023-06-07 NOTE — ED PROVIDER NOTE - CARDIAC, MLM
LOV: 07/08/22  Last refill: 12/15/22 with 1 refill  Next visit: 07/28/23
Normal rate, regular rhythm.  Heart sounds S1, S2.  No murmurs, rubs or gallops.

## 2023-06-28 ENCOUNTER — NON-APPOINTMENT (OUTPATIENT)
Age: 88
End: 2023-06-28

## 2023-07-11 ENCOUNTER — APPOINTMENT (OUTPATIENT)
Dept: UROGYNECOLOGY | Facility: CLINIC | Age: 88
End: 2023-07-11
Payer: MEDICARE

## 2023-07-11 VITALS
DIASTOLIC BLOOD PRESSURE: 87 MMHG | BODY MASS INDEX: 25.4 KG/M2 | SYSTOLIC BLOOD PRESSURE: 180 MMHG | HEIGHT: 62 IN | WEIGHT: 138 LBS

## 2023-07-11 PROCEDURE — 99213 OFFICE O/P EST LOW 20 MIN: CPT

## 2023-07-11 NOTE — PHYSICAL EXAM
[No Acute Distress] : in no acute distress [Well developed] : well developed [Well Nourished] : ~L well nourished [Good Hygeine] : demonstrates good hygeine [Oriented x3] : oriented to person, place, and time [Normal Mood/Affect] : mood and affect are normal [Soft, Nontender] : the abdomen was soft and nontender [Warm and Dry] : was warm and dry to touch [Normal Gait] : gait was normal [Vulvar Atrophy] : vulvar atrophy [Atrophy] : atrophy [Uterine Prolapse] : uterine prolapse [Discharge] : a  ~M vaginal discharge was present [Scant] : scant [Lakshmi] : yellow [Thin] : thin [No Bleeding] : there was no active vaginal bleeding

## 2023-07-17 NOTE — PROCEDURE
[New] : new [Good Fit] : fits well [Pessary Inserted] : inserted [Pessary Washed] : washed [None] : no bleeding [Medication Review] : Medicaiton Review: Patient verbalizes understanding of risks and benefits [Fluid Management] : Fluid Management: patient verbalizes understanding 6-10 cups per day [Bowel Management] : Bowel Management: patient verbalizes understanding of daily dietary fiber intake [Bladder Training] : Bladder Training: Patient given information with verbal understanding [Refit] : refit is not needed [Erosion] : no evidence of erosion [Erythema] : no erythema [Discharge] : no vaginal discharge [Infection] : no evidence of infection [FreeTextEntry1] : Ring with support # 3. [FreeTextEntry2] : Estring removed and new one inserted [FreeTextEntry3] : Estring, Replens, Aquaphor [FreeTextEntry8] : Reinforced daily pericare.

## 2023-07-17 NOTE — HISTORY OF PRESENT ILLNESS
[FreeTextEntry1] : Accompanied by son Cheng.\par \par Fide, 89y/o presents to the office for follow up for pelvic prolapse. Prolapse is supported with RS #3.  She has a Estring in place and using Replens in vagina.  She was asked to apply generous amount of either Aquaphor or A&D ointment. Denies any vaginal bleeding, pelvic pain or discomfort. Denies any trouble with urination or moving BM.\par \par

## 2023-07-18 NOTE — ED PROVIDER NOTE - NEUROLOGICAL COORDINATION
Subjective     Patient ID: Halie Mccormick normal

## 2023-07-20 ENCOUNTER — NON-APPOINTMENT (OUTPATIENT)
Age: 88
End: 2023-07-20

## 2023-08-08 ENCOUNTER — APPOINTMENT (OUTPATIENT)
Dept: UROGYNECOLOGY | Facility: CLINIC | Age: 88
End: 2023-08-08
Payer: MEDICARE

## 2023-08-08 VITALS
DIASTOLIC BLOOD PRESSURE: 62 MMHG | RESPIRATION RATE: 61 BRPM | WEIGHT: 140.13 LBS | OXYGEN SATURATION: 98 % | SYSTOLIC BLOOD PRESSURE: 172 MMHG | HEART RATE: 81 BPM | HEIGHT: 62 IN | BODY MASS INDEX: 25.79 KG/M2

## 2023-08-08 DIAGNOSIS — B96.89 ACUTE VAGINITIS: ICD-10-CM

## 2023-08-08 DIAGNOSIS — N76.0 ACUTE VAGINITIS: ICD-10-CM

## 2023-08-08 PROCEDURE — 99214 OFFICE O/P EST MOD 30 MIN: CPT

## 2023-08-08 NOTE — HISTORY OF PRESENT ILLNESS
[FreeTextEntry1] : Fide is here for a f/u visit.  She is has 1-2 more weeks of metrogel PV 2x/week left. She has been using gabapentin 10%/lido 5% topical cream BID with little to no improvement per son (pt states she is not burning constantly.  Cheng does not think his mom is using the cream in the correct place.   Difficult to obtain history from patient 2/2 decreased cognition/memory.

## 2023-08-08 NOTE — DISCUSSION/SUMMARY
[FreeTextEntry1] : After a lengthy discussion with Cheng, will change topical cream to gabapentin 10%/ketamine 5%/lido 5% in 1cc syringes for Fide to use 1/2 syringe BID to introitus.  Continue gential hygiene as previously discussed.  Finish metrogel 2x/week for 2 more weeks then d/c. Wet prep done in office pH=5.0, no lacto, few epithelial cells, no hyphae, no clue cells, no whiff, no trich, no WBC, +parabasal cells KOH prep neg hyphae  RTO 2-3 months

## 2023-08-08 NOTE — PHYSICAL EXAM
[No Acute Distress] : in no acute distress [Well developed] : well developed [Well Nourished] : ~L well nourished [Good Hygeine] : demonstrates good hygeine [Person] : oriented to person [Place] : oriented to place [Time] : oriented to time [Memory Confabulation] : confabulation was observed [Memory Significant Loss Requiring Supervision] : significant memory loss requiring supervision [Concentration Intact] : normal concentrating ability [Visual Intact] : visual attention was ~T not ~L decreased [Repeating Phrases] : no difficulty repeating a phrase [Current Events] : adequate knowledge of current events [Past History] : adequate fund of knowledge regarding past history [Vocabulary] : adequate fund of knowledge regarding vocabulary [Appropriate] : appropriate [No Edema] : ~T edema was not present [Warm and Dry] : was warm and dry to touch [Normal Gait] : gait was normal [No Lesions] : no lesions were seen on the external genitalia [Vulvar Atrophy] : vulvar atrophy [Labia Majora] : were normal [Labia Minora] : were normal [No Bleeding] : there was no active vaginal bleeding [Normal] : no abnormalities [Exam Deferred] : was deferred [Span Intact] : the attention span was decreased [Tenderness] : ~T no ~M abdominal tenderness observed [Distended] : not distended [de-identified] : Pt denies burning with q-tip palpation. She reports burning after exam. [FreeTextEntry4] : Pessary and Estring in place

## 2023-09-26 NOTE — ED ADULT TRIAGE NOTE - PAIN RATING/NUMBER SCALE (0-10): REST
Doing well on tracheostomy collar; able to tolerate Passy-Mp valve and talk. 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 status over more than 5 weeks, recommended consideration of surgical intervention as long-term TPN is not likely to allow leak to heal, and it will likely cause hepatic injury. She wishes to discuss in detail with her , Bill Skill, brother, and son tomorrow morning. In the meantime, recommended gastrostomy to gravity to continue proximal diversion. Okay for speech pathology to reassess swallowing, and clear liquids (comfort sips), would be okay, as would ice chips/popsicles. Patient needs to work hard with physical therapy, Occupational Therapy to be a better surgical candidate which would involve exploration, some form of proximal diversion (not a candidate for any form of anastomotic resection/reconstruction at this time). 0

## 2023-10-10 ENCOUNTER — APPOINTMENT (OUTPATIENT)
Dept: UROGYNECOLOGY | Facility: CLINIC | Age: 88
End: 2023-10-10
Payer: MEDICARE

## 2023-10-10 PROCEDURE — 99213 OFFICE O/P EST LOW 20 MIN: CPT

## 2023-10-18 ENCOUNTER — NON-APPOINTMENT (OUTPATIENT)
Age: 88
End: 2023-10-18

## 2023-10-25 ENCOUNTER — APPOINTMENT (OUTPATIENT)
Dept: UROGYNECOLOGY | Facility: CLINIC | Age: 88
End: 2023-10-25
Payer: MEDICARE

## 2023-10-25 PROCEDURE — 99213 OFFICE O/P EST LOW 20 MIN: CPT

## 2023-10-27 ENCOUNTER — APPOINTMENT (OUTPATIENT)
Dept: UROGYNECOLOGY | Facility: CLINIC | Age: 88
End: 2023-10-27
Payer: MEDICARE

## 2023-10-27 VITALS — HEART RATE: 84 BPM | SYSTOLIC BLOOD PRESSURE: 148 MMHG | DIASTOLIC BLOOD PRESSURE: 89 MMHG | TEMPERATURE: 97.5 F

## 2023-10-27 PROCEDURE — 99215 OFFICE O/P EST HI 40 MIN: CPT | Mod: 25

## 2023-10-27 PROCEDURE — 11900 INJECT SKIN LESIONS </W 7: CPT

## 2023-11-09 ENCOUNTER — APPOINTMENT (OUTPATIENT)
Dept: UROGYNECOLOGY | Facility: CLINIC | Age: 88
End: 2023-11-09
Payer: MEDICARE

## 2023-11-09 PROCEDURE — 99213 OFFICE O/P EST LOW 20 MIN: CPT

## 2023-11-09 PROCEDURE — A4562: CPT

## 2023-11-13 ENCOUNTER — APPOINTMENT (OUTPATIENT)
Dept: UROLOGY | Facility: CLINIC | Age: 88
End: 2023-11-13

## 2023-12-01 ENCOUNTER — APPOINTMENT (OUTPATIENT)
Dept: UROGYNECOLOGY | Facility: CLINIC | Age: 88
End: 2023-12-01
Payer: MEDICARE

## 2023-12-01 VITALS — TEMPERATURE: 97.5 F | DIASTOLIC BLOOD PRESSURE: 75 MMHG | SYSTOLIC BLOOD PRESSURE: 131 MMHG

## 2023-12-01 DIAGNOSIS — N89.8 OTHER SPECIFIED NONINFLAMMATORY DISORDERS OF VAGINA: ICD-10-CM

## 2023-12-01 PROCEDURE — 11900 INJECT SKIN LESIONS </W 7: CPT

## 2023-12-01 PROCEDURE — 99213 OFFICE O/P EST LOW 20 MIN: CPT | Mod: 25

## 2023-12-28 NOTE — PATIENT PROFILE ADULT - FALL HARM RISK
Crohn's disease is stable.  Patient to continue with current medications and treatment plan.  Follow-up at least annually.     age(85 years old or older)

## 2024-01-05 ENCOUNTER — APPOINTMENT (OUTPATIENT)
Dept: UROGYNECOLOGY | Facility: CLINIC | Age: 89
End: 2024-01-05
Payer: MEDICARE

## 2024-01-05 VITALS — TEMPERATURE: 97.2 F | SYSTOLIC BLOOD PRESSURE: 171 MMHG | DIASTOLIC BLOOD PRESSURE: 74 MMHG

## 2024-01-05 PROCEDURE — 99212 OFFICE O/P EST SF 10 MIN: CPT | Mod: 25

## 2024-01-05 PROCEDURE — 11900 INJECT SKIN LESIONS </W 7: CPT

## 2024-01-05 NOTE — DISCUSSION/SUMMARY
[FreeTextEntry1] : After PE, Fide elected to proceed with intralesional injections. SEE SCANNED PROCEDURE NOTE.  Continue genital hygiene as previously discussed.  d/c estrace PV as pt has Estring in place. May use topical lidocaine prn burning. Continue Vaseline as needed during the day.   I discussed with both Fide and Cheng that I am not sure how much relief she will get of her vulvovaginal burning. She denies improvement with topical creams. She cannot take po meds. I explained GVD and the fact that patients usually need an oral medication to help decrease burning. Cheng verbalized an understanding.   f/u [facility] for pessary care  RTO 4-6 weeks

## 2024-01-05 NOTE — PROCEDURE
[Other ___] : [unfilled] [Patient] : the patient [Other: ___] : [unfilled] [Consent Obtained] : written consent was obtained prior to the procedure and is detailed in the patient's record [None] : none [Betadine] : betadine [No] : Specimen not sent to Pathology [No Complications] : none [Tolerated Well] : the patient tolerated the procedure well [Post procedure instructions and information given] : post procedure instructions and information given and reviewed with patient. [3] : 3 [FreeTextEntry1] : SEE SCANNED PROCEDURE NOTE [FreeTextEntry8] : BURNING

## 2024-01-05 NOTE — HISTORY OF PRESENT ILLNESS
[FreeTextEntry1] : Fide is here for a f/u visit with her son Cheng.  She is not using any topical cream at this time. She went to [facility] x 1 but stopped b/c she had worsening of her pain/burning after PT.  She continues to c/o constant vulvovaginal burning. Per Cheng, she had had significant relief since starting intralesional injections. He reports she is complaining less and Fide reports she doesn't think about her burning as much. Cheng reports that when she forgets to use Vaseline, she complains of increased "dryness" and vulvovaginal burning. Difficult to obtain subjective description of pain/burning from patient 2/2 decreased cognition/memory.  Cheng was present for entire visit.

## 2024-01-05 NOTE — PHYSICAL EXAM
[Chaperone Present] : A chaperone was present in the examining room during all aspects of the physical examination [No Acute Distress] : in no acute distress [Well developed] : well developed [Well Nourished] : ~L well nourished [Good Hygeine] : demonstrates good hygeine [Oriented x3] : oriented to person, place, and time [Normal Mood/Affect] : mood and affect are normal [Respirations regular] : ~T respiratory rate was regular [Soft, Nontender] : the abdomen was soft and nontender [Warm and Dry] : was warm and dry to touch [Normal Gait] : gait was normal [Vulvar Atrophy] : vulvar atrophy [Labia Majora] : were normal [Labia Minora] : were normal [Normal] : was normal [Atrophy] : atrophy [Estrogen Effect] : estrogen effect was observed [Uterine Prolapse] : uterine prolapse [Discharge] : a  ~M vaginal discharge was present [Scant] : scant [Lakshmi] : yellow [Thin] : thin [No Bleeding] : there was no active vaginal bleeding [Exam Deferred] : was deferred [FreeTextEntry1] : Shaneka SNELL RN [Normal Memory] : ~T memory was ~L impaired [de-identified] : Q-tip touch test 4/10 @ 1,5,6,7,11. No fissures, ulcerations, erosions [de-identified] : GSM changes [FreeTextEntry4] : Pessary and estring in place. PFMs wnl, no discrete MTrPs but global pain to palpation [de-identified] : Pessary in place

## 2024-01-09 ENCOUNTER — APPOINTMENT (OUTPATIENT)
Dept: UROGYNECOLOGY | Facility: CLINIC | Age: 89
End: 2024-01-09
Payer: MEDICARE

## 2024-01-09 VITALS
HEIGHT: 62 IN | DIASTOLIC BLOOD PRESSURE: 89 MMHG | BODY MASS INDEX: 24.29 KG/M2 | WEIGHT: 132 LBS | SYSTOLIC BLOOD PRESSURE: 134 MMHG

## 2024-01-09 PROCEDURE — 99213 OFFICE O/P EST LOW 20 MIN: CPT

## 2024-01-11 NOTE — PROCEDURE
[New] : new [Good Fit] : fits well [Discharge] : there is vaginal discharge [Pessary Inserted] : inserted [Pessary Washed] : washed [None] : no bleeding [Medication Review] : Medicaiton Review: Patient verbalizes understanding of risks and benefits [Fluid Management] : Fluid Management: patient verbalizes understanding 6-10 cups per day [Bowel Management] : Bowel Management: patient verbalizes understanding of daily dietary fiber intake [Bladder Training] : Bladder Training: Patient given information with verbal understanding [Refit] : refit is not needed [Erosion] : no evidence of erosion [Erythema] : no erythema [Infection] : no evidence of infection [FreeTextEntry1] : Ring with support # 3. [FreeTextEntry3] : Estring, Replens, Aquaphor [FreeTextEntry8] : Reinforced daily pericare.

## 2024-01-11 NOTE — HISTORY OF PRESENT ILLNESS
[FreeTextEntry1] : Accompanied by son Jen Elizalde, 89y/o presents to the office for follow up for pelvic prolapse. Prolapse is supported with RS #3.  She has a Estring in place and using Replens in vagina.  She denies any vaginal bleeding or pelvic pain. She denies any issues with urination or moving her bowels.

## 2024-01-11 NOTE — DISCUSSION/SUMMARY
[FreeTextEntry1] : Pelvic prolapse: -Continue with  Ring with support # 3.  -Precautions and instructions were reviewed.  Vaginal atrophy/irritation/burning: -Estring in place (in vaginal vault). -May continue use of Replens at night BIW -Advised to use coconut oil -Apply generous amount of Aquaphor or A&D ointment to area after toileting and wearing a pad daily.   RTO in 3 months or sooner if needed Advised to call office with any issues or concerns

## 2024-02-02 ENCOUNTER — APPOINTMENT (OUTPATIENT)
Dept: UROGYNECOLOGY | Facility: CLINIC | Age: 89
End: 2024-02-02
Payer: MEDICARE

## 2024-02-02 VITALS — DIASTOLIC BLOOD PRESSURE: 96 MMHG | TEMPERATURE: 97.7 F | SYSTOLIC BLOOD PRESSURE: 174 MMHG

## 2024-02-02 PROCEDURE — 99213 OFFICE O/P EST LOW 20 MIN: CPT | Mod: 25

## 2024-02-02 PROCEDURE — 56820 COLPOSCOPY VULVA: CPT

## 2024-02-02 PROCEDURE — 11900 INJECT SKIN LESIONS </W 7: CPT

## 2024-02-02 NOTE — HISTORY OF PRESENT ILLNESS
[FreeTextEntry1] : Fide is here for a f/u visit with her son Cheng.  She is not using any topical cream at this time. She went to PFPT x 1 but stopped b/c she had worsening of her pain/burning after PT.  She continues to c/o intermittent vulvovaginal burning. Per Cheng, she had had significant relief since starting intralesional injections. He reports she is complaining less and Fide reports she doesn't think about her burning as much. Cheng reports that when she forgets to use Vaseline, she complains of increased "dryness" and vulvovaginal burning. She reports increased burning after urination and wiping. Fide did not have as robust a response to last injections as the first 2. Per Cheng, she has been c/o increased vulvovaginal burning for the past 2 weeks. Difficult to obtain subjective description of pain/burning from patient 2/2 decreased cognition/memory.  Cheng was present for entire visit.

## 2024-02-02 NOTE — DISCUSSION/SUMMARY
[FreeTextEntry1] : After PE, Fide elected to proceed with intralesional injections. SEE SCANNED PROCEDURE NOTE.  Continue genital hygiene as previously discussed.  May use topical lidocaine prn burning. Reminded Fide to apply Vaseline as needed during the day.  Encouraged Fide to use ice packs prn vulvar burning  I discussed with both Fide and Cheng that I am not sure how much relief she will get of her vulvovaginal burning. She denies improvement with topical creams. She cannot take po meds. I explained GVD and the fact that patients usually need an oral medication to help decrease burning. Cheng verbalized an understanding.   f/u Alonso William/Cristo for pessary care Affirm sent  Vulvoscopy done in office today. ?periurethral cyst vs. ventral distal urethral diverticulum (SEE PHOTOS IN CHART) Will get MRI and f/u cysto  RTO 4-6 weeks

## 2024-02-02 NOTE — PHYSICAL EXAM
[Chaperone Present] : A chaperone was present in the examining room during all aspects of the physical examination [No Acute Distress] : in no acute distress [Well developed] : well developed [Well Nourished] : ~L well nourished [Good Hygeine] : demonstrates good hygeine [Oriented x3] : oriented to person, place, and time [Normal Mood/Affect] : mood and affect are normal [Respirations regular] : ~T respiratory rate was regular [Soft, Nontender] : the abdomen was soft and nontender [Warm and Dry] : was warm and dry to touch [Normal Gait] : gait was normal [Vulvar Atrophy] : vulvar atrophy [Labia Majora] : were normal [Labia Minora] : were normal [Normal] : was normal [Atrophy] : atrophy [Estrogen Effect] : estrogen effect was observed [Uterine Prolapse] : uterine prolapse [Discharge] : a  ~M vaginal discharge was present [Scant] : scant [Lakshmi] : yellow [Thin] : thin [No Bleeding] : there was no active vaginal bleeding [Exam Deferred] : was deferred [FreeTextEntry1] : Shaneka SNELL RN [Normal Memory] : ~T memory was ~L impaired [de-identified] : Q-tip touch test 3/10 @ 1,5,6,7,11. No fissures, ulcerations, erosions [de-identified] : GSM changes [FreeTextEntry3] : SEE PHOTOS IN CHART [FreeTextEntry4] : Pessary and estring in place. PFMs wnl, no discrete MTrPs but global pain to palpation [de-identified] : Pessary in place

## 2024-02-06 ENCOUNTER — NON-APPOINTMENT (OUTPATIENT)
Age: 89
End: 2024-02-06

## 2024-02-06 RX ORDER — METRONIDAZOLE 7.5 MG/G
0.75 GEL VAGINAL
Qty: 70 | Refills: 2 | Status: COMPLETED | COMMUNITY
Start: 2023-05-24 | End: 2024-02-06

## 2024-02-06 RX ORDER — METRONIDAZOLE 7.5 MG/G
0.75 GEL VAGINAL
Qty: 2 | Refills: 0 | Status: ACTIVE | COMMUNITY
Start: 2024-02-06 | End: 1900-01-01

## 2024-02-06 RX ORDER — FLUCONAZOLE 150 MG/1
150 TABLET ORAL
Qty: 3 | Refills: 0 | Status: ACTIVE | COMMUNITY
Start: 2024-02-06 | End: 1900-01-01

## 2024-02-06 RX ORDER — CLINDAMYCIN PHOSPHATE 20 MG/G
2 CREAM VAGINAL
Qty: 1 | Refills: 0 | Status: COMPLETED | COMMUNITY
Start: 2023-05-24 | End: 2024-02-06

## 2024-02-07 ENCOUNTER — APPOINTMENT (OUTPATIENT)
Dept: UROGYNECOLOGY | Facility: CLINIC | Age: 89
End: 2024-02-07
Payer: MEDICARE

## 2024-02-07 PROCEDURE — 99213 OFFICE O/P EST LOW 20 MIN: CPT

## 2024-02-07 NOTE — PHYSICAL EXAM
[No Acute Distress] : in no acute distress [Well developed] : well developed [Well Nourished] : ~L well nourished [Good Hygeine] : demonstrates good hygeine [Anxiety] : patient is anxious [Soft, Nontender] : the abdomen was soft and nontender [Warm and Dry] : was warm and dry to touch [Normal Gait] : gait was normal [Vulvar Atrophy] : vulvar atrophy [Labia Majora] : were normal [Labia Minora] : were normal [Atrophy] : atrophy [Discharge] : a  ~M vaginal discharge was present [Scant] : scant [Lakshmi] : yellow [Thin] : thin [No Bleeding] : there was no active vaginal bleeding

## 2024-02-09 NOTE — DISCUSSION/SUMMARY
[FreeTextEntry1] : # Pelvic prolapse: -Ring with support # 3 left out in the setting of + BV -Precautions and instructions were reviewed.  She will manually reduce her prolapse if she has any issues voiding.  -RTO after completion of Metrogel for PSC reinsertion  #Vaginal atrophy/irritation/burning: -Continues with Estring -Care per Kimberly Figueroa NP  All questions answered to the patient's satisfaction.  She expressed understanding. She knows to contact the office with any questions or concerns.

## 2024-02-09 NOTE — PROCEDURE
[New] : new [Good Fit] : fits well [Discharge] : there is vaginal discharge [Pessary Out] : removed [Pessary Washed] : washed [None] : no bleeding [Bladder Training] : Bladder Training: Patient given information with verbal understanding [Refit] : refit is not needed [Erosion] : no evidence of erosion [Erythema] : no erythema [Infection] : no evidence of infection [FreeTextEntry1] : Ring with support # 3. [FreeTextEntry3] : Per SEGUNDO Figueroa [FreeTextEntry8] : Reinforced daily pericare.

## 2024-02-09 NOTE — HISTORY OF PRESENT ILLNESS
[FreeTextEntry1] : Accompanied by son Jen Elizalde is a 89 year with POP supported by a RS #3.  She also has longstanding intermittent vulvovaginal burning, which is managed by an estrogen ring and Kimberly Figueroa NP.  Fide presents to the office today for pessary care.  She is not sure if the pessary is sitting properly.  She has no acute voiding or bowel complaints.  No complaints of vaginal bleeding.    Upon chart reviewed, she had an affirm collected on 2/2/24 that was positive for Candida and Gardnerella.  She has been prescribed 3 doses of Fluconazole 150mg PO and Metrogel PV QHS x 7 nights.  She has not started the Metrogel yet.

## 2024-02-12 ENCOUNTER — APPOINTMENT (OUTPATIENT)
Dept: UROGYNECOLOGY | Facility: CLINIC | Age: 89
End: 2024-02-12

## 2024-02-13 NOTE — DISCHARGE NOTE PROVIDER - NSDCHHBASESERVICE_GEN_ALL_CORE
Trisha RN assessed patient this morning for C1D1 follow up, see other telephone encounter. Ghassan Recio states she just received phone call. Returned call to Haley Ibarra. He is concerned with patient's symptoms. Temp was 99.9. Nausea:  Patient is nauseated and doesn't have an appetite. Took Zofran ODT 4 mg x3 times yesterday and again this morning at 0600. Confirmed that the Zofran ODT she is using is from Dr. Georgia Arias (there were 3 scripts on med list, reconciled med list). Inquired if patient also using prochlorperazine. Haley Stacey had to locate the bottle, she has not been taking it. Advised to add prochlorperazine to antiemetic regimen, discussed how to take antiemetics. Denies vomiting. Advised that patient should take a prochlorperazine now and then a Zofran ODT when 8 hours is up. Continue to take antiemetics around the clock. Dizziness:  Dizziness/lightheadedness with any movement or standing, okay if sitting/laying/still. Once he had to catch her from falling after walking a couple of steps. Feels like room is spinning but it doesn't feel like vertigo, has had vertigo in the past.  Discussed hydration, constantly drinking fluids such as Ensure, water and unsweetened tea. Continues to have dizziness despite quantity of fluid. Diarrhea:    Diarrhea started yesterday almost instantly after treatment, pt had incontinent episode on way home. Liquid stool x4 yesterday. Took Imodium two tabs at 1630 and then every 2 hours until 2130. No liquid stool today, has been awake for a couple of hours. [place] Shock -  Please advise. Physical therapy

## 2024-02-14 ENCOUNTER — APPOINTMENT (OUTPATIENT)
Dept: UROGYNECOLOGY | Facility: CLINIC | Age: 89
End: 2024-02-14
Payer: MEDICARE

## 2024-02-14 VITALS
BODY MASS INDEX: 22.53 KG/M2 | WEIGHT: 132 LBS | SYSTOLIC BLOOD PRESSURE: 133 MMHG | HEART RATE: 76 BPM | DIASTOLIC BLOOD PRESSURE: 85 MMHG | HEIGHT: 64 IN | OXYGEN SATURATION: 99 %

## 2024-02-14 VITALS — DIASTOLIC BLOOD PRESSURE: 85 MMHG | SYSTOLIC BLOOD PRESSURE: 133 MMHG | OXYGEN SATURATION: 99 % | HEART RATE: 76 BPM

## 2024-02-14 PROCEDURE — 99213 OFFICE O/P EST LOW 20 MIN: CPT

## 2024-02-14 NOTE — PROCEDURE
[New] : new [Good Fit] : fits well [Pessary Washed] : washed [None] : no bleeding [Bladder Training] : Bladder Training: Patient given information with verbal understanding [Pessary Inserted] : inserted [Refit] : refit is not needed [Erosion] : no evidence of erosion [Erythema] : no erythema [Discharge] : no vaginal discharge [Infection] : no evidence of infection [FreeTextEntry1] : Ring with support # 3. [FreeTextEntry3] : Per SEGUNDO Figueroa [FreeTextEntry8] : Reinforced daily pericare.

## 2024-02-14 NOTE — DISCUSSION/SUMMARY
[FreeTextEntry1] : # Pelvic prolapse: -Ring with support # 3 reinserted -Precautions and instructions were reviewed.    #Vaginal atrophy/irritation/burning: -Estring reinserted -Care per Kimberly Figueroa NP  All questions answered to the patient's satisfaction.  She expressed understanding. She knows to contact the office with any questions or concerns.

## 2024-02-14 NOTE — HISTORY OF PRESENT ILLNESS
[FreeTextEntry1] : Accompanied by son Jen Elizalde is a 89 year with POP supported by a RS #3.  She also has longstanding intermittent vulvovaginal burning, which is managed by an estrogen ring and Kimberly Figueroa NP.  Fide presents to the office today for pessary reinsertion.  When she was here on 2/7/24, her PSC was left out in the setting of a +BV test.  She is now s/p treatment and would like her PSC reinserted.  Additionally, when her PSC was out, her estring fell out. Will reinsert her estring today as well.  She has no acute voiding or bowel complaints.  No complaints of vaginal bleeding.

## 2024-02-14 NOTE — PHYSICAL EXAM
[No Acute Distress] : in no acute distress [Well developed] : well developed [Well Nourished] : ~L well nourished [Good Hygeine] : demonstrates good hygeine [Anxiety] : patient is anxious [Soft, Nontender] : the abdomen was soft and nontender [Warm and Dry] : was warm and dry to touch [Normal Gait] : gait was normal [Vulvar Atrophy] : vulvar atrophy [Labia Majora] : were normal [Labia Minora] : were normal [Atrophy] : atrophy [Scant] : scant [Lakshmi] : yellow [Thin] : thin [No Bleeding] : there was no active vaginal bleeding [Dry Mucosa] : dry mucosa

## 2024-02-21 ENCOUNTER — APPOINTMENT (OUTPATIENT)
Dept: UROGYNECOLOGY | Facility: CLINIC | Age: 89
End: 2024-02-21
Payer: MEDICARE

## 2024-02-21 PROCEDURE — 99213 OFFICE O/P EST LOW 20 MIN: CPT

## 2024-02-21 PROCEDURE — A4562: CPT

## 2024-02-21 NOTE — PHYSICAL EXAM
[No Acute Distress] : in no acute distress [Well developed] : well developed [Well Nourished] : ~L well nourished [Good Hygeine] : demonstrates good hygeine [Anxiety] : patient is anxious [Warm and Dry] : was warm and dry to touch [Normal Gait] : gait was normal [Vulvar Atrophy] : vulvar atrophy [Labia Majora] : were normal [Labia Minora] : were normal [Atrophy] : atrophy [Dry Mucosa] : dry mucosa [No Bleeding] : there was no active vaginal bleeding [Discharge] : a  ~M vaginal discharge was present [Scant] : scant [White] : white [Thin] : thin

## 2024-02-21 NOTE — PROCEDURE
[New] : new [Pessary Inserted] : inserted [Pessary Washed] : washed [None] : no bleeding [Bladder Training] : Bladder Training: Patient given information with verbal understanding [Good Fit] : fit is not good [Refit] : refit needed [Erosion] : no evidence of erosion [Erythema] : no erythema [Discharge] : there is vaginal discharge [Infection] : no evidence of infection [FreeTextEntry1] : Ring with support # 3. [de-identified] : RS #4  [FreeTextEntry3] : Per SEGUNDO Figueroa [FreeTextEntry8] : Reinforced daily pericare.

## 2024-02-21 NOTE — HISTORY OF PRESENT ILLNESS
[FreeTextEntry1] : Accompanied by son Jen Elizalde is a 89 year with POP supported by a RS #3.  She also has longstanding intermittent vulvovaginal burning, which is managed by an estrogen ring and Kimberly Figueroa NP.  Fide presents to the office today for estring reinsertion.  She reports that she was urinating and it fell out.  Pt and son also endorses that her pessary and estring have been falling out more than usual this year.  She has no acute voiding or bowel complaints.  No complaints of vaginal bleeding.

## 2024-02-21 NOTE — DISCUSSION/SUMMARY
[FreeTextEntry1] : # Pelvic prolapse: -RS #3 changed to RS #4. -Precautions and instructions were reviewed.    #Vaginal atrophy/irritation/burning: -Estring reinserted -Care per Kimberly Figueroa NP  RTO in 2 weeks or sooner as needed.  All questions answered to the patient's satisfaction.  She expressed understanding. She knows to contact the office with any questions or concerns.

## 2024-02-28 ENCOUNTER — APPOINTMENT (OUTPATIENT)
Dept: UROGYNECOLOGY | Facility: CLINIC | Age: 89
End: 2024-02-28
Payer: MEDICARE

## 2024-02-28 PROCEDURE — 99213 OFFICE O/P EST LOW 20 MIN: CPT

## 2024-02-28 PROCEDURE — A4562: CPT

## 2024-02-28 NOTE — PHYSICAL EXAM
[No Acute Distress] : in no acute distress [Well developed] : well developed [Good Hygeine] : demonstrates good hygeine [Well Nourished] : ~L well nourished [Anxiety] : patient is anxious [Normal Gait] : gait was normal [Warm and Dry] : was warm and dry to touch [Vulvar Atrophy] : vulvar atrophy [Labia Majora] : were normal [Atrophy] : atrophy [Labia Minora] : were normal [Dry Mucosa] : dry mucosa [Discharge] : a  ~M vaginal discharge was present [Scant] : scant [Lakshmi] : yellow [Thin] : thin [No Bleeding] : there was no active vaginal bleeding

## 2024-03-01 ENCOUNTER — APPOINTMENT (OUTPATIENT)
Dept: UROGYNECOLOGY | Facility: CLINIC | Age: 89
End: 2024-03-01
Payer: MEDICARE

## 2024-03-01 VITALS — DIASTOLIC BLOOD PRESSURE: 87 MMHG | HEART RATE: 58 BPM | SYSTOLIC BLOOD PRESSURE: 148 MMHG | TEMPERATURE: 97.3 F

## 2024-03-01 DIAGNOSIS — N94.819 VULVODYNIA, UNSPECIFIED: ICD-10-CM

## 2024-03-01 PROCEDURE — 11900 INJECT SKIN LESIONS </W 7: CPT

## 2024-03-01 PROCEDURE — 99214 OFFICE O/P EST MOD 30 MIN: CPT | Mod: 25

## 2024-03-01 NOTE — DISCUSSION/SUMMARY
[FreeTextEntry1] : After PE, Fide elected to proceed with intralesional injections. SEE SCANNED PROCEDURE NOTE.  Continue genital hygiene as previously discussed.  May use topical lidocaine prn burning. Reminded Fide to apply Vaseline as needed during the day.  Encouraged Fide to use ice packs prn vulvar burning  f/u Alonso William/Cristo for pessary care Affirm sent  Cyst vs. diverticulum appears significantly smaller. Long discussion with Cheng regarding MRI. He does not think his mom will be able to tolerate MRI. Will monitor and attempt MRI if any changes. He agrees to plan.  t/c cysto  RTO 4-6 weeks

## 2024-03-01 NOTE — PHYSICAL EXAM
[Chaperone Present] : A chaperone was present in the examining room during all aspects of the physical examination [No Acute Distress] : in no acute distress [Well developed] : well developed [Well Nourished] : ~L well nourished [Good Hygeine] : demonstrates good hygeine [Oriented x3] : oriented to person, place, and time [Normal Mood/Affect] : mood and affect are normal [Respirations regular] : ~T respiratory rate was regular [Soft, Nontender] : the abdomen was soft and nontender [Warm and Dry] : was warm and dry to touch [Normal Gait] : gait was normal [Vulvar Atrophy] : vulvar atrophy [Labia Majora] : were normal [Labia Minora] : were normal [Normal] : was normal [Atrophy] : atrophy [Estrogen Effect] : estrogen effect was observed [Uterine Prolapse] : uterine prolapse [Discharge] : a  ~M vaginal discharge was present [Scant] : scant [Lakshmi] : yellow [No Bleeding] : there was no active vaginal bleeding [Thin] : thin [Exam Deferred] : was deferred [FreeTextEntry1] : Shaneka SNELL RN [Normal Memory] : ~T memory was ~L impaired [de-identified] : Q-tip touch test 3/10 @ 1,5,6,7,11. No fissures, ulcerations, erosions [de-identified] : GSM changes [FreeTextEntry3] : cyst vs. diverticulum appears smaller that at last visit. [FreeTextEntry4] : Pessary and estring in place. PFMs wnl, no discrete MTrPs but global pain to palpation [de-identified] : Pessary in place

## 2024-03-01 NOTE — HISTORY OF PRESENT ILLNESS
[FreeTextEntry1] : Fide is here for a f/u visit with her son Cheng.  She is not using any topical cream at this time. She went to PFPT x 1 but stopped b/c she had worsening of her pain/burning after PT.  Since her last visit, she had been having problems with her pessary. She now has gelhorn LS# 2 1/2 and is tolerating. She was treated for BV and VVC.  She continues to c/o intermittent vulvovaginal burning. Per Cheng, she had had significant relief since starting intralesional injections. He reports she is complaining less and Fide reports she doesn't think about her burning as much. Cheng reports that when she forgets to use Vaseline, she complains of increased "dryness" and vulvovaginal burning. She reports decreased burning after urination and wiping. Fide did not have as robust a response to last injections as the first 2. Per Cheng, she has been c/o increased vulvovaginal burning for the past 2 weeks. Difficult to obtain subjective description of pain/burning from patient 2/2 decreased cognition/memory.  Cheng was present for entire visit.

## 2024-03-04 NOTE — PROCEDURE
[Refit] : refit needed [Discharge] : there is vaginal discharge [Pessary Inserted] : inserted [None] : no bleeding [Bladder Training] : Bladder Training: Patient given information with verbal understanding [Good Fit] : fit is not good [Erosion] : no evidence of erosion [Erythema] : no erythema [Infection] : no evidence of infection [FreeTextEntry1] : RS #4 [de-identified] : HAYLEE LS #2 1/2 [FreeTextEntry3] : Per SEGUNDO Figueroa [FreeTextEntry8] : Reinforced daily pericare.

## 2024-03-04 NOTE — HISTORY OF PRESENT ILLNESS
[FreeTextEntry1] : Accompanied by son Jen Elizalde is a 89 year with POP supported by a RS #4.  She also has longstanding intermittent vulvovaginal burning, which is managed by an estrogen ring and Kimberly Figueroa NP.  Pt's pessary was changed from RS #3 to RS #4 at last visit (02/21/24) because the pessary kept falling out as per the son.  Today she reports that RS #4 feels uncomfortable and it is hurting her.  She has no acute voiding or bowel complaints.  No complaints of vaginal bleeding.

## 2024-03-04 NOTE — DISCUSSION/SUMMARY
[FreeTextEntry1] : # Pelvic prolapse: -RS #4 changed to GH LS #2 1/2  -Precautions and instructions were reviewed.    #Vaginal atrophy/irritation/burning: -Continue with Estring  -Care per Kimberly Figueroa NP  RTO in 2 weeks or sooner as needed.  All questions answered to the patient's satisfaction.  She expressed understanding. She knows to contact the office with any questions or concerns.

## 2024-03-05 ENCOUNTER — NON-APPOINTMENT (OUTPATIENT)
Age: 89
End: 2024-03-05

## 2024-03-05 RX ORDER — CLINDAMYCIN PHOSPHATE 20 MG/G
2 CREAM VAGINAL
Qty: 1 | Refills: 0 | Status: ACTIVE | COMMUNITY
Start: 2024-03-05 | End: 1900-01-01

## 2024-03-07 ENCOUNTER — APPOINTMENT (OUTPATIENT)
Dept: UROGYNECOLOGY | Facility: CLINIC | Age: 89
End: 2024-03-07
Payer: MEDICARE

## 2024-03-07 PROCEDURE — 99213 OFFICE O/P EST LOW 20 MIN: CPT

## 2024-03-07 NOTE — DISCUSSION/SUMMARY
[FreeTextEntry1] : # Pelvic prolapse: -GHLS 21/2 removed today for BV treatment -Reviewed instructions on Metronidazole treatment, all questions answered -Precautions and instructions were reviewed.   -She does not want to continue with Gelhorn pessary, requesting RS#4 to be replaced   Vaginal irritation -Continue with Estring- Rx refilled f/u on 4/11/24 for exchange  -Care per Kimberly Figueroa NP  RTO in 2 weeks or sooner as needed.  All questions answered to the patient's satisfaction.  She expressed understanding. She knows to contact the office with any questions or concerns.

## 2024-03-07 NOTE — PHYSICAL EXAM
[No Acute Distress] : in no acute distress [Well developed] : well developed [Well Nourished] : ~L well nourished [Anxiety] : patient is anxious [Good Hygeine] : demonstrates good hygeine [Warm and Dry] : was warm and dry to touch [Normal Gait] : gait was normal [Vulvar Atrophy] : vulvar atrophy [Labia Majora] : were normal [Labia Minora] : were normal [Atrophy] : atrophy [Dry Mucosa] : dry mucosa [Discharge] : a  ~M vaginal discharge was present [Scant] : scant [Lakshmi] : yellow [Thin] : thin [No Bleeding] : there was no active vaginal bleeding

## 2024-03-07 NOTE — PROCEDURE
[Refit] : refit needed [Discharge] : there is vaginal discharge [None] : no bleeding [Bladder Training] : Bladder Training: Patient given information with verbal understanding [Good Fit] : fit is not good [Erythema] : no erythema [Erosion] : no evidence of erosion [Pessary Out] : removed [Infection] : no evidence of infection [de-identified] : RS#3----RS#4--- LS #2 1/2 [FreeTextEntry1] : [facility] 2/12 [FreeTextEntry3] : Per SEGUNDO Figueroa [FreeTextEntry8] : Reinforced daily pericare.

## 2024-03-07 NOTE — HISTORY OF PRESENT ILLNESS
[FreeTextEntry1] : Accompanied by son, Jen Elizalde is a 89 year with vaginal atrophy, POP recently switched from RS#4 to GHLS 21/2.  She presents today with c/o pelvic discomfort.  Additionally, +BV on last affirm, therefore she presents for pessary to be removed for treatment.  She also has longstanding intermittent vulvovaginal burning, which is managed by an estrogen ring and Kimberly Figueroa NP.    Of note, previously tried RS #3, GHLS 2 1/2 and RS #4

## 2024-03-12 ENCOUNTER — NON-APPOINTMENT (OUTPATIENT)
Age: 89
End: 2024-03-12

## 2024-03-18 ENCOUNTER — APPOINTMENT (OUTPATIENT)
Dept: UROGYNECOLOGY | Facility: CLINIC | Age: 89
End: 2024-03-18

## 2024-03-19 ENCOUNTER — NON-APPOINTMENT (OUTPATIENT)
Age: 89
End: 2024-03-19

## 2024-03-21 ENCOUNTER — APPOINTMENT (OUTPATIENT)
Dept: UROGYNECOLOGY | Facility: CLINIC | Age: 89
End: 2024-03-21
Payer: MEDICARE

## 2024-03-21 VITALS — DIASTOLIC BLOOD PRESSURE: 81 MMHG | HEART RATE: 87 BPM | SYSTOLIC BLOOD PRESSURE: 161 MMHG

## 2024-03-21 DIAGNOSIS — N81.6 RECTOCELE: ICD-10-CM

## 2024-03-21 PROCEDURE — A4562: CPT

## 2024-03-21 PROCEDURE — 99213 OFFICE O/P EST LOW 20 MIN: CPT

## 2024-03-21 NOTE — HISTORY OF PRESENT ILLNESS
[FreeTextEntry1] : Accompanied by son, Jen Elizalde is a 89 year with vaginal atrophy, POP supported with GH LS #2 1/2 that was causing her pelvic discomfort so it was removed at the last visit (03/07/24).  Additionally, +BV on last affirm and she is s/p treatment; she no longer has any discomfort or unusual discharge.  She also has longstanding intermittent vulvovaginal burning, which is managed by an estrogen ring and Kimberly Figueroa NP.  The estrogen ring fell out few days after her pessary was removed for BV treatment and pt is requesting that new e-string be inserted today so that we can coordinate e-string and pessary care.   Of note, previously tried RS #3, GHLS 2 1/2 and RS #4 because RS #3 kept falling out.  GH LS #2 1/2 and RS #4 were causing her discomfort and patient and family request that RS #3 be reinserted.

## 2024-03-21 NOTE — PROCEDURE
[Follow Up] : follow up [Good Fit] : fits well [Discharge] : there is vaginal discharge [Pessary Inserted] : inserted [None] : no bleeding [Bladder Training] : Bladder Training: Patient given information with verbal understanding [Refit] : refit is not needed [Erosion] : no evidence of erosion [Erythema] : no erythema [Infection] : no evidence of infection [FreeTextEntry1] : RS #3 [FreeTextEntry3] : Per SEGUNDO Figueroa [FreeTextEntry8] : Reinforced daily pericare.

## 2024-03-21 NOTE — PHYSICAL EXAM
[No Acute Distress] : in no acute distress [Well developed] : well developed [Good Hygeine] : demonstrates good hygeine [Well Nourished] : ~L well nourished [Anxiety] : patient is anxious [Warm and Dry] : was warm and dry to touch [Normal Gait] : gait was normal [Vulvar Atrophy] : vulvar atrophy [Labia Majora] : were normal [Labia Minora] : were normal [Atrophy] : atrophy [Dry Mucosa] : dry mucosa [Discharge] : a  ~M vaginal discharge was present [Scant] : scant [Lakshmi] : yellow [Thin] : thin [No Bleeding] : there was no active vaginal bleeding

## 2024-03-21 NOTE — DISCUSSION/SUMMARY
[FreeTextEntry1] : # Pelvic prolapse: -Continue with RS #3  -Precautions and instructions were reviewed.   -Discussed with pt and family that RS #3 may fall out again; they verbalize understanding and will contact the office if it falls out.   Vaginal irritation -Continue with Estring -Care per Kimberly Figueroa NP  RTO in 3 months or sooner as needed.  All questions answered to the patient's satisfaction.  She expressed understanding. She knows to contact the office with any questions or concerns.

## 2024-03-22 ENCOUNTER — APPOINTMENT (OUTPATIENT)
Dept: UROGYNECOLOGY | Facility: CLINIC | Age: 89
End: 2024-03-22
Payer: MEDICARE

## 2024-03-22 PROCEDURE — 57160 INSERT PESSARY/OTHER DEVICE: CPT

## 2024-03-22 PROCEDURE — A4562: CPT

## 2024-03-22 RX ORDER — ESTRADIOL 2 MG/1
7.5 SYSTEM VAGINAL
Qty: 1 | Refills: 2 | Status: ACTIVE | COMMUNITY
Start: 2023-12-29 | End: 1900-01-01

## 2024-03-27 ENCOUNTER — APPOINTMENT (OUTPATIENT)
Dept: UROGYNECOLOGY | Facility: CLINIC | Age: 89
End: 2024-03-27
Payer: MEDICARE

## 2024-03-27 PROCEDURE — 99213 OFFICE O/P EST LOW 20 MIN: CPT

## 2024-03-29 ENCOUNTER — APPOINTMENT (OUTPATIENT)
Dept: UROGYNECOLOGY | Facility: CLINIC | Age: 89
End: 2024-03-29

## 2024-04-05 ENCOUNTER — APPOINTMENT (OUTPATIENT)
Dept: UROGYNECOLOGY | Facility: CLINIC | Age: 89
End: 2024-04-05

## 2024-04-11 ENCOUNTER — APPOINTMENT (OUTPATIENT)
Dept: UROGYNECOLOGY | Facility: CLINIC | Age: 89
End: 2024-04-11

## 2024-04-19 ENCOUNTER — APPOINTMENT (OUTPATIENT)
Dept: UROGYNECOLOGY | Facility: CLINIC | Age: 89
End: 2024-04-19
Payer: MEDICARE

## 2024-04-19 VITALS — DIASTOLIC BLOOD PRESSURE: 81 MMHG | TEMPERATURE: 97.9 F | SYSTOLIC BLOOD PRESSURE: 145 MMHG

## 2024-04-19 DIAGNOSIS — N95.8 OTHER SPECIFIED MENOPAUSAL AND PERIMENOPAUSAL DISORDERS: ICD-10-CM

## 2024-04-19 DIAGNOSIS — R30.0 DYSURIA: ICD-10-CM

## 2024-04-19 DIAGNOSIS — N36.8 OTHER SPECIFIED DISORDERS OF URETHRA: ICD-10-CM

## 2024-04-19 DIAGNOSIS — N94.89 OTHER SPECIFIED CONDITIONS ASSOCIATED WITH FEMALE GENITAL ORGANS AND MENSTRUAL CYCLE: ICD-10-CM

## 2024-04-19 PROCEDURE — 51701 INSERT BLADDER CATHETER: CPT | Mod: 59

## 2024-04-19 PROCEDURE — 99213 OFFICE O/P EST LOW 20 MIN: CPT | Mod: 25

## 2024-04-19 PROCEDURE — 11900 INJECT SKIN LESIONS </W 7: CPT

## 2024-04-24 LAB
APPEARANCE: CLEAR
BACTERIA UR CULT: NORMAL
BACTERIA: NEGATIVE /HPF
BILIRUBIN URINE: NEGATIVE
BLOOD URINE: ABNORMAL
CAST: 1 /LPF
COLOR: YELLOW
EPITHELIAL CELLS: 0 /HPF
GLUCOSE QUALITATIVE U: NEGATIVE MG/DL
KETONES URINE: NEGATIVE MG/DL
LEUKOCYTE ESTERASE URINE: NEGATIVE
MICROSCOPIC-UA: NORMAL
NITRITE URINE: NEGATIVE
PH URINE: 7
PROTEIN URINE: NEGATIVE MG/DL
RED BLOOD CELLS URINE: 14 /HPF
SPECIFIC GRAVITY URINE: 1.02
UROBILINOGEN URINE: 0.2 MG/DL
WHITE BLOOD CELLS URINE: 0 /HPF

## 2024-04-27 NOTE — ED ADULT NURSE NOTE - PMH
Gastroesophageal reflux disease without esophagitis    Hiatal hernia    HTN (hypertension)    Hypothyroid    IBS (irritable bowel syndrome) T2DM (type 2 diabetes mellitus)

## 2024-06-20 ENCOUNTER — APPOINTMENT (OUTPATIENT)
Dept: UROGYNECOLOGY | Facility: CLINIC | Age: 89
End: 2024-06-20

## 2024-06-26 ENCOUNTER — APPOINTMENT (OUTPATIENT)
Dept: UROGYNECOLOGY | Facility: CLINIC | Age: 89
End: 2024-06-26
Payer: MEDICARE

## 2024-06-26 VITALS — DIASTOLIC BLOOD PRESSURE: 75 MMHG | SYSTOLIC BLOOD PRESSURE: 122 MMHG

## 2024-06-26 DIAGNOSIS — N94.9 UNSPECIFIED CONDITION ASSOCIATED WITH FEMALE GENITAL ORGANS AND MENSTRUAL CYCLE: ICD-10-CM

## 2024-06-26 DIAGNOSIS — N81.9 FEMALE GENITAL PROLAPSE, UNSPECIFIED: ICD-10-CM

## 2024-06-26 DIAGNOSIS — Z46.89 ENCOUNTER FOR FITTING AND ADJUSTMENT OF OTHER SPECIFIED DEVICES: ICD-10-CM

## 2024-06-26 PROCEDURE — 99212 OFFICE O/P EST SF 10 MIN: CPT

## 2024-07-01 ENCOUNTER — APPOINTMENT (OUTPATIENT)
Dept: UROGYNECOLOGY | Facility: CLINIC | Age: 89
End: 2024-07-01
Payer: MEDICARE

## 2024-07-01 DIAGNOSIS — N95.2 POSTMENOPAUSAL ATROPHIC VAGINITIS: ICD-10-CM

## 2024-07-01 DIAGNOSIS — N81.11 CYSTOCELE, MIDLINE: ICD-10-CM

## 2024-07-01 DIAGNOSIS — N89.8 OTHER SPECIFIED NONINFLAMMATORY DISORDERS OF VAGINA: ICD-10-CM

## 2024-07-01 PROCEDURE — 99212 OFFICE O/P EST SF 10 MIN: CPT

## 2024-07-15 ENCOUNTER — APPOINTMENT (OUTPATIENT)
Dept: UROGYNECOLOGY | Facility: CLINIC | Age: 89
End: 2024-07-15

## 2024-07-15 VITALS — DIASTOLIC BLOOD PRESSURE: 78 MMHG | SYSTOLIC BLOOD PRESSURE: 150 MMHG

## 2024-07-15 PROCEDURE — 99212 OFFICE O/P EST SF 10 MIN: CPT

## 2024-07-19 ENCOUNTER — APPOINTMENT (OUTPATIENT)
Dept: UROGYNECOLOGY | Facility: CLINIC | Age: 89
End: 2024-07-19
Payer: MEDICARE

## 2024-07-19 VITALS — TEMPERATURE: 98 F | DIASTOLIC BLOOD PRESSURE: 79 MMHG | SYSTOLIC BLOOD PRESSURE: 127 MMHG

## 2024-07-19 DIAGNOSIS — N95.8 OTHER SPECIFIED MENOPAUSAL AND PERIMENOPAUSAL DISORDERS: ICD-10-CM

## 2024-07-19 DIAGNOSIS — N94.9 UNSPECIFIED CONDITION ASSOCIATED WITH FEMALE GENITAL ORGANS AND MENSTRUAL CYCLE: ICD-10-CM

## 2024-07-19 DIAGNOSIS — N94.819 VULVODYNIA, UNSPECIFIED: ICD-10-CM

## 2024-07-19 DIAGNOSIS — N36.8 OTHER SPECIFIED DISORDERS OF URETHRA: ICD-10-CM

## 2024-07-19 PROCEDURE — 99213 OFFICE O/P EST LOW 20 MIN: CPT

## 2024-07-19 PROCEDURE — 99459 PELVIC EXAMINATION: CPT

## 2024-09-20 ENCOUNTER — APPOINTMENT (OUTPATIENT)
Dept: UROGYNECOLOGY | Facility: CLINIC | Age: 89
End: 2024-09-20

## 2024-09-20 VITALS — SYSTOLIC BLOOD PRESSURE: 140 MMHG | DIASTOLIC BLOOD PRESSURE: 87 MMHG

## 2024-09-20 DIAGNOSIS — Z46.89 ENCOUNTER FOR FITTING AND ADJUSTMENT OF OTHER SPECIFIED DEVICES: ICD-10-CM

## 2024-09-20 DIAGNOSIS — N81.9 FEMALE GENITAL PROLAPSE, UNSPECIFIED: ICD-10-CM

## 2024-09-20 DIAGNOSIS — N36.8 OTHER SPECIFIED DISORDERS OF URETHRA: ICD-10-CM

## 2024-09-20 DIAGNOSIS — R30.0 DYSURIA: ICD-10-CM

## 2024-09-20 LAB
BILIRUB UR QL STRIP: NEGATIVE
CLARITY UR: CLEAR
COLLECTION METHOD: NORMAL
GLUCOSE UR-MCNC: NEGATIVE
HCG UR QL: 0.2 EU/DL
HGB UR QL STRIP.AUTO: NORMAL
KETONES UR-MCNC: NEGATIVE
LEUKOCYTE ESTERASE UR QL STRIP: NEGATIVE
NITRITE UR QL STRIP: NEGATIVE
PH UR STRIP: 5.5
PROT UR STRIP-MCNC: NEGATIVE
SP GR UR STRIP: 1.02

## 2024-09-20 PROCEDURE — 99213 OFFICE O/P EST LOW 20 MIN: CPT

## 2024-09-20 PROCEDURE — 81003 URINALYSIS AUTO W/O SCOPE: CPT | Mod: QW

## 2024-09-20 PROCEDURE — 51798 US URINE CAPACITY MEASURE: CPT

## 2024-09-23 LAB
APPEARANCE: CLEAR
BACTERIA UR CULT: NORMAL
BACTERIA: NEGATIVE /HPF
BILIRUBIN URINE: NEGATIVE
BLOOD URINE: ABNORMAL
CAST: 2 /LPF
COLOR: YELLOW
EPITHELIAL CELLS: 0 /HPF
GLUCOSE QUALITATIVE U: NEGATIVE MG/DL
HYALINE CASTS: PRESENT
KETONES URINE: NEGATIVE MG/DL
LEUKOCYTE ESTERASE URINE: NEGATIVE
MICROSCOPIC-UA: NORMAL
NITRITE URINE: NEGATIVE
PH URINE: 5.5
PROTEIN URINE: NORMAL MG/DL
RED BLOOD CELLS URINE: 1 /HPF
REVIEW: NORMAL
SPECIFIC GRAVITY URINE: 1.02
UROBILINOGEN URINE: 0.2 MG/DL
WHITE BLOOD CELLS URINE: 1 /HPF

## 2024-10-11 ENCOUNTER — RX RENEWAL (OUTPATIENT)
Age: 89
End: 2024-10-11

## 2024-12-13 ENCOUNTER — APPOINTMENT (OUTPATIENT)
Dept: UROGYNECOLOGY | Facility: CLINIC | Age: 88
End: 2024-12-13

## 2024-12-18 ENCOUNTER — APPOINTMENT (OUTPATIENT)
Dept: UROGYNECOLOGY | Facility: CLINIC | Age: 88
End: 2024-12-18

## 2024-12-18 VITALS — DIASTOLIC BLOOD PRESSURE: 88 MMHG | SYSTOLIC BLOOD PRESSURE: 142 MMHG

## 2024-12-18 DIAGNOSIS — N81.9 FEMALE GENITAL PROLAPSE, UNSPECIFIED: ICD-10-CM

## 2024-12-18 DIAGNOSIS — N95.2 POSTMENOPAUSAL ATROPHIC VAGINITIS: ICD-10-CM

## 2024-12-18 DIAGNOSIS — N81.11 CYSTOCELE, MIDLINE: ICD-10-CM

## 2024-12-18 DIAGNOSIS — Z46.89 ENCOUNTER FOR FITTING AND ADJUSTMENT OF OTHER SPECIFIED DEVICES: ICD-10-CM

## 2024-12-18 DIAGNOSIS — N89.8 OTHER SPECIFIED NONINFLAMMATORY DISORDERS OF VAGINA: ICD-10-CM

## 2024-12-18 PROCEDURE — 99212 OFFICE O/P EST SF 10 MIN: CPT | Mod: 25

## 2024-12-18 PROCEDURE — 57170 FITTING OF DIAPHRAGM/CAP: CPT

## 2024-12-24 NOTE — ED ADULT TRIAGE NOTE - BSA (M2)
Problem: At Risk for Falls  Goal: Patient does not fall  Outcome: Monitoring/Evaluating progress  Goal: Patient takes action to control fall-related risks  Outcome: Monitoring/Evaluating progress     Problem: Diabetes  Goal: Achieves glycemic balance  Description: Goal is to maintain blood sugar within range with no episodes of hypoglycemia  Outcome: Monitoring/Evaluating progress     Problem: Fluid Volume Excess  Goal: Fluid Volume Excess Symptoms Resolved  Description: Treatment often consists of oxygen and respiratory support with diuretic therapy at doses that exceed usual dose (typically doubled).  Monitor patient response to treatment.    Acute dyspnea should resolve quickly if dose is adequate and kidney function is adequate. Dyspnea/SOB should only be observed with Activity after effective treatment. Patient should be able to lie down comfortably, without SOB.  Outcome: Monitoring/Evaluating progress  Goal: Verbalizes understanding of FVE management plan  Description: Document on Patient Education Activity  Outcome: Monitoring/Evaluating progress      1.59

## 2025-01-17 ENCOUNTER — APPOINTMENT (OUTPATIENT)
Dept: UROGYNECOLOGY | Facility: CLINIC | Age: 89
End: 2025-01-17
Payer: MEDICARE

## 2025-01-17 VITALS — TEMPERATURE: 98.3 F | DIASTOLIC BLOOD PRESSURE: 82 MMHG | SYSTOLIC BLOOD PRESSURE: 140 MMHG

## 2025-01-17 DIAGNOSIS — N89.8 OTHER SPECIFIED NONINFLAMMATORY DISORDERS OF VAGINA: ICD-10-CM

## 2025-01-17 DIAGNOSIS — N81.11 CYSTOCELE, MIDLINE: ICD-10-CM

## 2025-01-17 DIAGNOSIS — R39.9 UNSPECIFIED SYMPTOMS AND SIGNS INVOLVING THE GENITOURINARY SYSTEM: ICD-10-CM

## 2025-01-17 DIAGNOSIS — N95.8 OTHER SPECIFIED MENOPAUSAL AND PERIMENOPAUSAL DISORDERS: ICD-10-CM

## 2025-01-17 DIAGNOSIS — N94.819 VULVODYNIA, UNSPECIFIED: ICD-10-CM

## 2025-01-17 PROCEDURE — 99213 OFFICE O/P EST LOW 20 MIN: CPT | Mod: 25

## 2025-01-17 PROCEDURE — 51701 INSERT BLADDER CATHETER: CPT

## 2025-01-21 LAB
APPEARANCE: CLEAR
BACTERIA UR CULT: NORMAL
BACTERIA: NEGATIVE /HPF
BILIRUBIN URINE: NEGATIVE
BLOOD URINE: ABNORMAL
CAST: 1 /LPF
COLOR: YELLOW
EPITHELIAL CELLS: 1 /HPF
GLUCOSE QUALITATIVE U: NEGATIVE MG/DL
KETONES URINE: NEGATIVE MG/DL
LEUKOCYTE ESTERASE URINE: NEGATIVE
MICROSCOPIC-UA: NORMAL
NITRITE URINE: NEGATIVE
PH URINE: 7
PROTEIN URINE: NEGATIVE MG/DL
RED BLOOD CELLS URINE: 21 /HPF
SPECIFIC GRAVITY URINE: 1.02
UROBILINOGEN URINE: 0.2 MG/DL
WHITE BLOOD CELLS URINE: 1 /HPF

## 2025-02-28 ENCOUNTER — APPOINTMENT (OUTPATIENT)
Dept: UROGYNECOLOGY | Facility: CLINIC | Age: 89
End: 2025-02-28

## 2025-02-28 VITALS
HEART RATE: 64 BPM | SYSTOLIC BLOOD PRESSURE: 132 MMHG | HEIGHT: 64 IN | RESPIRATION RATE: 14 BRPM | WEIGHT: 132 LBS | DIASTOLIC BLOOD PRESSURE: 64 MMHG | BODY MASS INDEX: 22.53 KG/M2

## 2025-02-28 DIAGNOSIS — N81.9 FEMALE GENITAL PROLAPSE, UNSPECIFIED: ICD-10-CM

## 2025-02-28 DIAGNOSIS — Z46.89 ENCOUNTER FOR FITTING AND ADJUSTMENT OF OTHER SPECIFIED DEVICES: ICD-10-CM

## 2025-02-28 DIAGNOSIS — N95.2 POSTMENOPAUSAL ATROPHIC VAGINITIS: ICD-10-CM

## 2025-02-28 DIAGNOSIS — N81.11 CYSTOCELE, MIDLINE: ICD-10-CM

## 2025-02-28 DIAGNOSIS — R30.0 DYSURIA: ICD-10-CM

## 2025-02-28 DIAGNOSIS — N89.8 OTHER SPECIFIED NONINFLAMMATORY DISORDERS OF VAGINA: ICD-10-CM

## 2025-02-28 LAB
BILIRUB UR QL STRIP: NEGATIVE
CLARITY UR: CLEAR
COLLECTION METHOD: NORMAL
GLUCOSE UR-MCNC: NEGATIVE
HCG UR QL: 0.2 EU/DL
HGB UR QL STRIP.AUTO: NORMAL
KETONES UR-MCNC: NEGATIVE
LEUKOCYTE ESTERASE UR QL STRIP: NEGATIVE
NITRITE UR QL STRIP: NEGATIVE
PH UR STRIP: 6.5
PROT UR STRIP-MCNC: NORMAL
SP GR UR STRIP: 1.01

## 2025-02-28 PROCEDURE — 57170 FITTING OF DIAPHRAGM/CAP: CPT

## 2025-02-28 PROCEDURE — 99213 OFFICE O/P EST LOW 20 MIN: CPT | Mod: 25

## 2025-02-28 PROCEDURE — 81003 URINALYSIS AUTO W/O SCOPE: CPT | Mod: QW

## 2025-03-03 LAB — BACTERIA UR CULT: NORMAL

## 2025-03-04 LAB
APPEARANCE: CLEAR
BACTERIA: NEGATIVE /HPF
BILIRUBIN URINE: NEGATIVE
BLOOD URINE: ABNORMAL
CAST: NORMAL /LPF
COLOR: YELLOW
EPITHELIAL CELLS: 0 /HPF
GLUCOSE QUALITATIVE U: NEGATIVE MG/DL
KETONES URINE: NEGATIVE MG/DL
LEUKOCYTE ESTERASE URINE: NEGATIVE
MICROSCOPIC-UA: NORMAL
NITRITE URINE: NEGATIVE
PH URINE: 6.5
PROTEIN URINE: NORMAL MG/DL
RED BLOOD CELLS URINE: 26 /HPF
REVIEW: NORMAL
SPECIFIC GRAVITY URINE: 1.02
UROBILINOGEN URINE: 0.2 MG/DL
WHITE BLOOD CELLS URINE: 0 /HPF

## 2025-06-18 ENCOUNTER — APPOINTMENT (OUTPATIENT)
Dept: UROGYNECOLOGY | Facility: CLINIC | Age: 89
End: 2025-06-18

## 2025-06-18 VITALS
BODY MASS INDEX: 22.53 KG/M2 | SYSTOLIC BLOOD PRESSURE: 142 MMHG | WEIGHT: 132 LBS | HEIGHT: 64 IN | HEART RATE: 70 BPM | DIASTOLIC BLOOD PRESSURE: 82 MMHG

## 2025-06-18 PROCEDURE — 57170 FITTING OF DIAPHRAGM/CAP: CPT

## 2025-06-18 PROCEDURE — 51798 US URINE CAPACITY MEASURE: CPT

## 2025-06-18 PROCEDURE — 99213 OFFICE O/P EST LOW 20 MIN: CPT | Mod: 25

## 2025-06-20 RX ORDER — METRONIDAZOLE 500 MG/1
500 TABLET ORAL
Qty: 14 | Refills: 0 | Status: ACTIVE | COMMUNITY
Start: 2025-06-20 | End: 1900-01-01